# Patient Record
Sex: FEMALE | Race: BLACK OR AFRICAN AMERICAN | Employment: FULL TIME | URBAN - METROPOLITAN AREA
[De-identification: names, ages, dates, MRNs, and addresses within clinical notes are randomized per-mention and may not be internally consistent; named-entity substitution may affect disease eponyms.]

---

## 2017-03-02 ENCOUNTER — OFFICE VISIT (OUTPATIENT)
Dept: FAMILY MEDICINE CLINIC | Age: 57
End: 2017-03-02

## 2017-03-02 VITALS
SYSTOLIC BLOOD PRESSURE: 146 MMHG | HEIGHT: 62 IN | DIASTOLIC BLOOD PRESSURE: 98 MMHG | TEMPERATURE: 97.8 F | BODY MASS INDEX: 32.68 KG/M2 | OXYGEN SATURATION: 94 % | HEART RATE: 75 BPM | RESPIRATION RATE: 12 BRPM | WEIGHT: 177.6 LBS

## 2017-03-02 DIAGNOSIS — I10 HTN, GOAL BELOW 130/80: ICD-10-CM

## 2017-03-02 DIAGNOSIS — E78.00 HYPERCHOLESTEREMIA: Primary | ICD-10-CM

## 2017-03-02 DIAGNOSIS — E03.8 OTHER SPECIFIED HYPOTHYROIDISM: ICD-10-CM

## 2017-03-02 RX ORDER — NEBIVOLOL 5 MG/1
TABLET ORAL
Qty: 30 TAB | Refills: 5 | Status: SHIPPED | OUTPATIENT
Start: 2017-03-02 | End: 2017-10-20 | Stop reason: SDUPTHER

## 2017-03-02 RX ORDER — OLMESARTAN MEDOXOMIL, AMLODIPINE AND HYDROCHLOROTHIAZIDE TABLET 40/10/25 MG 40; 10; 25 MG/1; MG/1; MG/1
TABLET ORAL
Qty: 30 TAB | Refills: 5 | Status: SHIPPED | OUTPATIENT
Start: 2017-03-02 | End: 2017-05-09

## 2017-03-02 NOTE — MR AVS SNAPSHOT
Visit Information Date & Time Provider Department Dept. Phone Encounter #  
 3/2/2017 10:45 AM Radha Pyle MD [address] OFF[facility] [phone] [id number] Upcoming Health Maintenance Date Due Hepatitis C Screening 1960 INFLUENZA AGE 9 TO ADULT 8/1/2016 PAP AKA CERVICAL CYTOLOGY 9/18/2016 BREAST CANCER SCRN MAMMOGRAM 11/24/2017 DTaP/Tdap/Td series (2 - Td) 6/4/2025 Allergies as of 3/2/2017  Review Complete On: 3/2/2017 By: Althea Hopper LPN Severity Noted Reaction Type Reactions Oxycontin [Oxycodone]  09/24/2014   Side Effect Nausea and Vomiting Shellfish Derived  06/04/2015    Hives Current Immunizations  Reviewed on 2/8/2016 Name Date Influenza Vaccine (Quad) PF 2/8/2016 Tdap 6/4/2015 Not reviewed this visit You Were Diagnosed With   
  
 Codes Comments Hypercholesteremia    -  Primary ICD-10-CM: E78.00 ICD-9-CM: 272.0   
 HTN, goal below 130/80     ICD-10-CM: I10 
ICD-9-CM: 401.9 Other specified hypothyroidism     ICD-10-CM: E03.8 ICD-9-CM: 244.8 Vitals BP  
  
  
  
  
  
 (!) 146/98 (BP 1 Location: Left arm, BP Patient Position: At rest) BMI and BSA Data Body Mass Index Body Surface Area  
 32.48 kg/m 2 1.88 m 2 Preferred Pharmacy Pharmacy Name Phone [facility] [address] [place] [address] [phone] Your Updated Medication List  
  
   
This list is accurate as of: 3/2/17 11:36 AM.  Always use your most recent med list.  
  
  
  
  
 aspirin, buffered 81 mg Tab Take 1 Tab by mouth daily. HYDROmorphone 2 mg tablet Commonly known as:  DILAUDID Take 1 tablet by mouth every four (4) hours as needed for Pain. * levothyroxine 50 mcg tablet Commonly known as:  SYNTHROID  
TAKE ONE TABLET BY MOUTH DAILY BEFORE BREAKFAST * levothyroxine 50 mcg tablet Commonly known as:  LEVOTHROID  
 Take 1 Tab by mouth Daily (before breakfast). * levothyroxine 50 mcg tablet Commonly known as:  SYNTHROID  
TAKE ONE TABLET BY MOUTH DAILY BEFORE BREAKFAST  
  
 nebivolol 5 mg tablet Commonly known as:  BYSTOLIC  
TAKE ONE TABLET BY MOUTH DAILY FOR HYPERTENSION  
  
 * TRIBENZOR 40-10-25 mg Tab Generic drug:  Olmesartan-amLODIPine-HCTZ TAKE ONE TABLET BY MOUTH DAILY  
  
 * Olmesartan-amLODIPine-HCTZ 40-10-25 mg Tab Commonly known as:  TRIBENZOR  
TAKE ONE TABLET BY MOUTH DAILY * Notice: This list has 5 medication(s) that are the same as other medications prescribed for you. Read the directions carefully, and ask your doctor or other care provider to review them with you. Prescriptions Sent to Pharmacy Refills Olmesartan-amLODIPine-HCTZ (TRIBENZOR) 40-10-25 mg tab 5 Sig: TAKE ONE TABLET BY MOUTH DAILY Class: Normal  
 Pharmacy: [address] Ph #: [phone]  
 nebivolol (BYSTOLIC) 5 mg tablet 5 Sig: TAKE ONE TABLET BY MOUTH DAILY FOR HYPERTENSION Class: Normal  
 Pharmacy: [address] Ph #: [phone] We Performed the Following CBC W/O DIFF [03624 CPT(R)] LIPID PANEL [36359 CPT(R)] METABOLIC PANEL, COMPREHENSIVE [30922 CPT(R)] TSH 3RD GENERATION [64862 CPT(R)] Introducing [facility]! Jose R Turner introduces [employer] patient portal. Now you can access parts of your medical record, email your doctor's office, and request medication refills online. 1. In your internet browser, go to [email or web address] 2. Click on the First Time User? Click Here link in the Sign In box. You will see the New Member Sign Up page. 3. Enter your [employer] Access Code exactly as it appears below. You will not need to use this code after youve completed the sign-up process.  If you do not sign up before the expiration date, you must request a new code. · [employer] Access Code: [email or web address] Expires: 5/31/2017 11:36 AM 
 
4. Enter the last four digits of your Social Security Number (xxxx) and Date of Birth (mm/dd/yyyy) as indicated and click Submit. You will be taken to the next sign-up page. 5. Create a [employer] ID. This will be your [employer] login ID and cannot be changed, so think of one that is secure and easy to remember. 6. Create a [employer] password. You can change your password at any time. 7. Enter your Password Reset Question and Answer. This can be used at a later time if you forget your password. 8. Enter your e-mail address. You will receive e-mail notification when new information is available in [address] 9. Click Sign Up. You can now view and download portions of your medical record. 10. Click the Download Summary menu link to download a portable copy of your medical information. If you have questions, please visit the Frequently Asked Questions section of the [employer] website. Remember, [employer] is NOT to be used for urgent needs. For medical emergencies, dial 911. Now available from your iPhone and Android! Please provide this summary of care documentation to your next provider. Your primary care clinician is listed as Aiden Nicole. If you have any questions after today's visit, please call [phone].

## 2017-03-02 NOTE — PROGRESS NOTES
HISTORY OF PRESENT ILLNESS  Dionne Hughes is a 55 y.o. female. HPI       HTN  Today pt present for Bp and thyroid check and the patient stating that so far there has not been a Compliancy w/ the bp meds for last couple days ran out of her meds,  she is having had no low salt diet and likes her smoked meats,   the patient has been active life style and does do the daily walking, Ms. Jayme Gruber states that she does not obtain the bp at home,   today the pt denies Chest Pain, has no legs swelling no lightheadedness,  the pat has not been feeling anxious, and  Has not been feeling stressed out, otherwise feeling better since the last visit      Current Outpatient Prescriptions   Medication Sig Dispense Refill    Olmesartan-amLODIPine-HCTZ (TRIBENZOR) 40-10-25 mg tab TAKE ONE TABLET BY MOUTH DAILY 30 Tab 5    nebivolol (BYSTOLIC) 5 mg tablet TAKE ONE TABLET BY MOUTH DAILY FOR HYPERTENSION 30 Tab 5    TRIBENZOR 40-10-25 mg tab TAKE ONE TABLET BY MOUTH DAILY 30 Tab 1    levothyroxine (SYNTHROID) 50 mcg tablet TAKE ONE TABLET BY MOUTH DAILY BEFORE BREAKFAST 30 Tab 5    levothyroxine (SYNTHROID) 50 mcg tablet TAKE ONE TABLET BY MOUTH DAILY BEFORE BREAKFAST 30 Tab 1    levothyroxine (LEVOTHROID) 50 mcg tablet Take 1 Tab by mouth Daily (before breakfast). 30 Tab 6    Aspirin, Buffered 81 mg tab Take 1 Tab by mouth daily. 30 Tab 11    HYDROmorphone (DILAUDID) 2 mg tablet Take 1 tablet by mouth every four (4) hours as needed for Pain.  24 tablet 0     Allergies   Allergen Reactions    Oxycontin [Oxycodone] Nausea and Vomiting    Shellfish Derived Hives     Past Medical History:   Diagnosis Date    Cardiomegaly - hypertensive 6/4/2015    Family history of sarcoidosis 6/4/2015    HTN (hypertension) 3/5/14 notes    noelle urean notes rec'd    Hypercholesteremia 9/24/2014    Hypothyroidism 10/8/2014    Irregular heart beat 9/24/2014    Midline thoracic back pain 6/4/2015    MVP (mitral valve prolapse) 9/24/2014    Prediabetes 9/24/2014    Thyroid disease     Vitamin D deficiency 9/24/2014     Past Surgical History:   Procedure Laterality Date    HX GYN      HX HYSTERECTOMY N/A 1995    HX UROLOGICAL       History reviewed. No pertinent family history. Social History   Substance Use Topics    Smoking status: Never Smoker    Smokeless tobacco: Never Used    Alcohol use No      Lab Results  Component Value Date/Time   WBC 7.9 02/09/2016 08:24 AM   HGB 12.9 02/09/2016 08:24 AM   HCT 38.3 02/09/2016 08:24 AM   PLATELET 265 65/46/6499 08:24 AM   MCV 81 02/09/2016 08:24 AM       Lab Results  Component Value Date/Time   GFR est  02/09/2016 08:24 AM   GFR est non- 02/09/2016 08:24 AM   Creatinine 0.49 02/09/2016 08:24 AM   BUN 16 02/09/2016 08:24 AM   Sodium 140 02/09/2016 08:24 AM   Potassium 3.8 02/09/2016 08:24 AM   Chloride 101 02/09/2016 08:24 AM   CO2 27 02/09/2016 08:24 AM         Review of Systems   Constitutional: Negative for chills and fever. HENT: Negative for ear pain and nosebleeds. Eyes: Negative for blurred vision, pain and discharge. Respiratory: Negative for shortness of breath. Cardiovascular: Negative for chest pain and leg swelling. Gastrointestinal: Negative for constipation, diarrhea, nausea and vomiting. Genitourinary: Negative for frequency. Musculoskeletal: Negative for joint pain. Skin: Negative for itching and rash. Neurological: Negative for headaches. Psychiatric/Behavioral: Negative for depression. The patient is not nervous/anxious. Physical Exam   Constitutional: She is oriented to person, place, and time. She appears well-developed and well-nourished. HENT:   Head: Normocephalic and atraumatic. Eyes: Conjunctivae and EOM are normal.   Neck: Normal range of motion. Neck supple. Cardiovascular: Normal rate, regular rhythm and normal heart sounds. No murmur heard. Pulmonary/Chest: Effort normal and breath sounds normal.   Abdominal: Soft.  Bowel sounds are normal. She exhibits no distension. Musculoskeletal: Normal range of motion. She exhibits no edema. Neurological: She is alert and oriented to person, place, and time. Skin: No erythema. Psychiatric: Her behavior is normal.   Nursing note and vitals reviewed. ASSESSMENT and PLAN  Justyn Rahman was seen today for hypertension and thyroid problem.     Diagnoses and all orders for this visit:    Hypercholesteremia  -     Olmesartan-amLODIPine-HCTZ (TRIBENZOR) 40-10-25 mg tab; TAKE ONE TABLET BY MOUTH DAILY  -     nebivolol (BYSTOLIC) 5 mg tablet; TAKE ONE TABLET BY MOUTH DAILY FOR HYPERTENSION  -     CBC W/O DIFF  -     METABOLIC PANEL, COMPREHENSIVE  -     TSH 3RD GENERATION  -     LIPID PANEL    HTN, goal below 130/80  -     Olmesartan-amLODIPine-HCTZ (TRIBENZOR) 40-10-25 mg tab; TAKE ONE TABLET BY MOUTH DAILY  -     nebivolol (BYSTOLIC) 5 mg tablet; TAKE ONE TABLET BY MOUTH DAILY FOR HYPERTENSION  -     CBC W/O DIFF  -     METABOLIC PANEL, COMPREHENSIVE  -     TSH 3RD GENERATION  -     LIPID PANEL    Other specified hypothyroidism  -     Olmesartan-amLODIPine-HCTZ (TRIBENZOR) 40-10-25 mg tab; TAKE ONE TABLET BY MOUTH DAILY  -     nebivolol (BYSTOLIC) 5 mg tablet; TAKE ONE TABLET BY MOUTH DAILY FOR HYPERTENSION  -     CBC W/O DIFF  -     METABOLIC PANEL, COMPREHENSIVE  -     TSH 3RD GENERATION  -     LIPID PANEL

## 2017-03-03 LAB
ALBUMIN SERPL-MCNC: 3.9 G/DL (ref 3.5–5.5)
ALBUMIN/GLOB SERPL: 1.1 {RATIO} (ref 1.1–2.5)
ALP SERPL-CCNC: 87 IU/L (ref 39–117)
ALT SERPL-CCNC: 24 IU/L (ref 0–32)
AST SERPL-CCNC: 16 IU/L (ref 0–40)
BILIRUB SERPL-MCNC: 0.5 MG/DL (ref 0–1.2)
BUN SERPL-MCNC: 12 MG/DL (ref 6–24)
BUN/CREAT SERPL: 24 (ref 9–23)
CALCIUM SERPL-MCNC: 9.6 MG/DL (ref 8.7–10.2)
CHLORIDE SERPL-SCNC: 97 MMOL/L (ref 96–106)
CHOLEST SERPL-MCNC: 122 MG/DL (ref 100–199)
CO2 SERPL-SCNC: 26 MMOL/L (ref 18–29)
CREAT SERPL-MCNC: 0.5 MG/DL (ref 0.57–1)
ERYTHROCYTE [DISTWIDTH] IN BLOOD BY AUTOMATED COUNT: 13.6 % (ref 12.3–15.4)
GLOBULIN SER CALC-MCNC: 3.7 G/DL (ref 1.5–4.5)
GLUCOSE SERPL-MCNC: 96 MG/DL (ref 65–99)
HCT VFR BLD AUTO: 38.2 % (ref 34–46.6)
HDLC SERPL-MCNC: 33 MG/DL
HGB BLD-MCNC: 12.6 G/DL (ref 11.1–15.9)
LDLC SERPL CALC-MCNC: 68 MG/DL (ref 0–99)
MCH RBC QN AUTO: 26.5 PG (ref 26.6–33)
MCHC RBC AUTO-ENTMCNC: 33 G/DL (ref 31.5–35.7)
MCV RBC AUTO: 80 FL (ref 79–97)
PLATELET # BLD AUTO: 300 X10E3/UL (ref 150–379)
POTASSIUM SERPL-SCNC: 3.5 MMOL/L (ref 3.5–5.2)
PROT SERPL-MCNC: 7.6 G/DL (ref 6–8.5)
RBC # BLD AUTO: 4.76 X10E6/UL (ref 3.77–5.28)
SODIUM SERPL-SCNC: 139 MMOL/L (ref 134–144)
TRIGL SERPL-MCNC: 107 MG/DL (ref 0–149)
TSH SERPL DL<=0.005 MIU/L-ACNC: 0.72 UIU/ML (ref 0.45–4.5)
VLDLC SERPL CALC-MCNC: 21 MG/DL (ref 5–40)
WBC # BLD AUTO: 9.4 X10E3/UL (ref 3.4–10.8)

## 2017-04-17 RX ORDER — LEVOTHYROXINE SODIUM 50 UG/1
TABLET ORAL
Qty: 30 TAB | Refills: 4 | Status: SHIPPED | OUTPATIENT
Start: 2017-04-17 | End: 2017-09-21 | Stop reason: SDUPTHER

## 2017-05-09 RX ORDER — AMLODIPINE BESYLATE 10 MG/1
10 TABLET ORAL DAILY
Qty: 30 TAB | Refills: 4 | Status: SHIPPED | OUTPATIENT
Start: 2017-05-09 | End: 2018-01-29 | Stop reason: ALTCHOICE

## 2017-05-09 RX ORDER — LOSARTAN POTASSIUM AND HYDROCHLOROTHIAZIDE 25; 100 MG/1; MG/1
1 TABLET ORAL DAILY
Qty: 30 TAB | Refills: 4 | Status: SHIPPED | OUTPATIENT
Start: 2017-05-09 | End: 2018-01-29 | Stop reason: ALTCHOICE

## 2017-08-03 DIAGNOSIS — E78.00 HYPERCHOLESTEREMIA: ICD-10-CM

## 2017-08-03 DIAGNOSIS — I10 HTN, GOAL BELOW 130/80: ICD-10-CM

## 2017-08-07 RX ORDER — OLMESARTAN MEDOXOMIL / AMLODIPINE BESYLATE / HYDROCHLOROTHIAZIDE 40; 10; 25 MG/1; MG/1; MG/1
TABLET, FILM COATED ORAL
Qty: 30 TAB | Refills: 4 | Status: SHIPPED | OUTPATIENT
Start: 2017-08-07 | End: 2018-01-25 | Stop reason: SDUPTHER

## 2017-09-21 RX ORDER — LEVOTHYROXINE SODIUM 50 UG/1
TABLET ORAL
Qty: 30 TAB | Refills: 3 | Status: SHIPPED | OUTPATIENT
Start: 2017-09-21 | End: 2018-01-24 | Stop reason: SDUPTHER

## 2017-10-17 DIAGNOSIS — I10 HTN, GOAL BELOW 130/80: ICD-10-CM

## 2017-10-17 DIAGNOSIS — E03.8 OTHER SPECIFIED HYPOTHYROIDISM: ICD-10-CM

## 2017-10-17 DIAGNOSIS — E78.00 HYPERCHOLESTEREMIA: ICD-10-CM

## 2017-10-20 DIAGNOSIS — E03.8 OTHER SPECIFIED HYPOTHYROIDISM: ICD-10-CM

## 2017-10-20 DIAGNOSIS — I10 HTN, GOAL BELOW 130/80: ICD-10-CM

## 2017-10-20 DIAGNOSIS — E78.00 HYPERCHOLESTEREMIA: ICD-10-CM

## 2017-10-20 NOTE — TELEPHONE ENCOUNTER
----- Message from Elaine Fernandes sent at 10/20/2017  8:25 AM EDT -----  Regarding: /Telephone  Pt inquiring about status of refill for  \"Bystolic\" Rx. Please give a call. Best contact [phone].

## 2017-10-23 ENCOUNTER — TELEPHONE (OUTPATIENT)
Dept: FAMILY MEDICINE CLINIC | Age: 57
End: 2017-10-23

## 2017-10-23 DIAGNOSIS — E03.8 OTHER SPECIFIED HYPOTHYROIDISM: ICD-10-CM

## 2017-10-23 DIAGNOSIS — I10 HTN, GOAL BELOW 130/80: ICD-10-CM

## 2017-10-23 DIAGNOSIS — E78.00 HYPERCHOLESTEREMIA: ICD-10-CM

## 2017-10-23 RX ORDER — NEBIVOLOL 5 MG/1
TABLET ORAL
Qty: 30 TAB | Refills: 0 | Status: SHIPPED | OUTPATIENT
Start: 2017-10-23 | End: 2018-01-29 | Stop reason: SDUPTHER

## 2017-10-23 NOTE — TELEPHONE ENCOUNTER
----- Message from Pushpa Pineda sent at 10/23/2017 12:29 PM EDT -----  Regarding: / refill  Pt requesting a refill for \"Dystolic\" to be sent to the [address] [place] [address] . Pt stated refill requested was put in on 10/18/17. Best contact number [phone].

## 2017-10-24 RX ORDER — NEBIVOLOL 5 MG/1
TABLET ORAL
Qty: 30 TAB | Refills: 0 | Status: SHIPPED | OUTPATIENT
Start: 2017-10-24 | End: 2018-01-29 | Stop reason: SDUPTHER

## 2017-10-24 RX ORDER — NEBIVOLOL HYDROCHLORIDE 5 MG/1
TABLET ORAL
Qty: 30 TAB | Refills: 4 | Status: SHIPPED | OUTPATIENT
Start: 2017-10-24 | End: 2018-01-29 | Stop reason: SDUPTHER

## 2018-01-25 DIAGNOSIS — I10 HTN, GOAL BELOW 130/80: ICD-10-CM

## 2018-01-25 DIAGNOSIS — E78.00 HYPERCHOLESTEREMIA: ICD-10-CM

## 2018-01-25 RX ORDER — LEVOTHYROXINE SODIUM 50 UG/1
TABLET ORAL
Qty: 30 TAB | Refills: 2 | Status: SHIPPED | OUTPATIENT
Start: 2018-01-25 | End: 2018-01-29 | Stop reason: SDUPTHER

## 2018-01-25 RX ORDER — OLMESARTAN MEDOXOMIL / AMLODIPINE BESYLATE / HYDROCHLOROTHIAZIDE 40; 10; 25 MG/1; MG/1; MG/1
TABLET, FILM COATED ORAL
Qty: 30 TAB | Refills: 0 | Status: SHIPPED | OUTPATIENT
Start: 2018-01-25 | End: 2018-01-29 | Stop reason: SDUPTHER

## 2018-01-25 NOTE — TELEPHONE ENCOUNTER
----- Message from [facility] sent at 1/25/2018 12:46 PM EST -----  Regarding: Dr Holbrook/Telephone  Pt is requesting a few pills of her Thyroid medication and \"Tribenzor\" to last her the weekend until she sees the doctor on 1/29. Pt would like a call back if this can be done.   Pt best contact [phone]

## 2018-01-29 ENCOUNTER — OFFICE VISIT (OUTPATIENT)
Dept: FAMILY MEDICINE CLINIC | Age: 58
End: 2018-01-29

## 2018-01-29 VITALS
SYSTOLIC BLOOD PRESSURE: 134 MMHG | HEART RATE: 78 BPM | OXYGEN SATURATION: 95 % | TEMPERATURE: 95 F | HEIGHT: 62 IN | BODY MASS INDEX: 33.68 KG/M2 | WEIGHT: 183 LBS | RESPIRATION RATE: 14 BRPM | DIASTOLIC BLOOD PRESSURE: 89 MMHG

## 2018-01-29 DIAGNOSIS — R73.03 PREDIABETES: ICD-10-CM

## 2018-01-29 DIAGNOSIS — E78.00 HYPERCHOLESTEREMIA: ICD-10-CM

## 2018-01-29 DIAGNOSIS — Z12.11 SCREEN FOR COLON CANCER: ICD-10-CM

## 2018-01-29 DIAGNOSIS — E03.8 OTHER SPECIFIED HYPOTHYROIDISM: ICD-10-CM

## 2018-01-29 DIAGNOSIS — I10 HTN, GOAL BELOW 130/80: Primary | ICD-10-CM

## 2018-01-29 DIAGNOSIS — E03.9 HYPOTHYROIDISM, UNSPECIFIED TYPE: ICD-10-CM

## 2018-01-29 DIAGNOSIS — Z23 ENCOUNTER FOR IMMUNIZATION: ICD-10-CM

## 2018-01-29 RX ORDER — LEVOTHYROXINE SODIUM 50 UG/1
50 TABLET ORAL
Qty: 30 TAB | Refills: 11 | Status: SHIPPED | OUTPATIENT
Start: 2018-01-29 | End: 2019-02-14 | Stop reason: SDUPTHER

## 2018-01-29 RX ORDER — OLMESARTAN MEDOXOMIL, AMLODIPINE AND HYDROCHLOROTHIAZIDE TABLET 40/10/25 MG 40; 10; 25 MG/1; MG/1; MG/1
TABLET ORAL
Qty: 30 TAB | Refills: 11 | Status: SHIPPED | OUTPATIENT
Start: 2018-01-29 | End: 2019-02-14 | Stop reason: SDUPTHER

## 2018-01-29 RX ORDER — NEBIVOLOL 5 MG/1
TABLET ORAL
Qty: 30 TAB | Refills: 11 | Status: SHIPPED | OUTPATIENT
Start: 2018-01-29 | End: 2019-01-28 | Stop reason: SDUPTHER

## 2018-01-29 NOTE — PROGRESS NOTES
HISTORY OF PRESENT ILLNESS  Rosanglea Lim is a 65 y.o. female. HPI   Refusing statin and colonoscopy, but agrees with her flu immunizations at this time present for f/u on her HTN, Today pt present for Bp check and the patient stating that so far there has been a Compliancy w/ the bp meds, she is having had the low salt diet   the patient has been active life style and does do the daily walking, in addition pt states that patien does not obtain the bp at home   today the pt denies Chest Pain, has no legs swelling no lightheadedness,the patient has not been feeling anxious, and  Has not been feeling stressed out, otherwise feeling better since the last visit      Current Outpatient Prescriptions   Medication Sig Dispense Refill    Olmesartan-amLODIPine-HCTZ (TRIBENZOR) 40-10-25 mg tab TAKE ONE TABLET BY MOUTH DAILY 30 Tab 11    nebivolol (BYSTOLIC) 5 mg tablet TAKE ONE TABLET BY MOUTH DAILY FOR HYPERTENSION 30 Tab 11    levothyroxine (LEVOTHROID) 50 mcg tablet Take 1 Tab by mouth Daily (before breakfast). 30 Tab 11    Aspirin, Buffered 81 mg tab Take 1 Tab by mouth daily. 30 Tab 11     Allergies   Allergen Reactions    Oxycontin [Oxycodone] Nausea and Vomiting    Shellfish Derived Hives     Past Medical History:   Diagnosis Date    BMI 33.0-33.9,adult 1/29/2018    Cardiomegaly - hypertensive 6/4/2015    Family history of sarcoidosis 6/4/2015    HTN (hypertension) 3/5/14 notes    noelle urena notes rec'd    Hypercholesteremia 9/24/2014    Hypothyroidism 10/8/2014    Irregular heart beat 9/24/2014    Midline thoracic back pain 6/4/2015    MVP (mitral valve prolapse) 9/24/2014    Prediabetes 9/24/2014    Thyroid disease     Vitamin D deficiency 9/24/2014     Past Surgical History:   Procedure Laterality Date    HX GYN      HX HYSTERECTOMY N/A 1995    HX UROLOGICAL       History reviewed. No pertinent family history.   Social History   Substance Use Topics    Smoking status: Never Smoker    Smokeless tobacco: Never Used    Alcohol use No      Lab Results  Component Value Date/Time   WBC 9.4 03/02/2017 11:37 AM   HGB 12.6 03/02/2017 11:37 AM   HCT 38.2 03/02/2017 11:37 AM   PLATELET 307 44/81/1777 11:37 AM   MCV 80 03/02/2017 11:37 AM     Lab Results  Component Value Date/Time   GFR est non- 03/02/2017 11:37 AM   GFR est  03/02/2017 11:37 AM   Creatinine 0.50 03/02/2017 11:37 AM   BUN 12 03/02/2017 11:37 AM   Sodium 139 03/02/2017 11:37 AM   Potassium 3.5 03/02/2017 11:37 AM   Chloride 97 03/02/2017 11:37 AM   CO2 26 03/02/2017 11:37 AM        Review of Systems   Constitutional: Negative for chills, fever and malaise/fatigue. HENT: Negative for congestion and nosebleeds. Eyes: Negative for blurred vision and pain. Respiratory: Negative for shortness of breath and wheezing. Cardiovascular: Negative for chest pain, palpitations and leg swelling. Gastrointestinal: Negative for constipation, diarrhea, nausea and vomiting. Genitourinary: Negative for dysuria and frequency. Musculoskeletal: Negative for joint pain and myalgias. Skin: Negative for itching and rash. Neurological: Negative for dizziness, loss of consciousness and headaches. Endo/Heme/Allergies: Does not bruise/bleed easily. Psychiatric/Behavioral: Negative for depression. The patient is not nervous/anxious and does not have insomnia. All other systems reviewed and are negative. Physical Exam   Constitutional: She is oriented to person, place, and time. She appears well-developed and well-nourished. HENT:   Head: Normocephalic and atraumatic. Eyes: Conjunctivae and EOM are normal. Pupils are equal, round, and reactive to light. Neck: No JVD present. No thyromegaly present. Cardiovascular: Normal rate, regular rhythm, normal heart sounds and intact distal pulses. Exam reveals no gallop and no friction rub. No murmur heard. Pulmonary/Chest: Effort normal and breath sounds normal. No stridor.  No respiratory distress. She has no wheezes. She has no rales. Abdominal: Soft. Bowel sounds are normal. She exhibits no distension and no mass. There is no tenderness. Musculoskeletal: Normal range of motion. She exhibits no edema or tenderness. Lymphadenopathy:     She has no cervical adenopathy. Neurological: She is alert and oriented to person, place, and time. She has normal reflexes. No cranial nerve deficit. Skin: No rash noted. No erythema. Psychiatric: She has a normal mood and affect. Her behavior is normal.   Nursing note and vitals reviewed. ASSESSMENT and PLAN  Diagnoses and all orders for this visit:    1. HTN, goal below 130/80  -     HEPATITIS C AB  -     TSH 3RD GENERATION  -     CBC W/O DIFF  -     METABOLIC PANEL, COMPREHENSIVE  -     HDL CHOLESTEROL  -     CHOLESTEROL, TOTAL  -     Olmesartan-amLODIPine-HCTZ (TRIBENZOR) 40-10-25 mg tab; TAKE ONE TABLET BY MOUTH DAILY  -     nebivolol (BYSTOLIC) 5 mg tablet; TAKE ONE TABLET BY MOUTH DAILY FOR HYPERTENSION  -     levothyroxine (LEVOTHROID) 50 mcg tablet; Take 1 Tab by mouth Daily (before breakfast). 2. Prediabetes  -     HEPATITIS C AB  -     TSH 3RD GENERATION  -     CBC W/O DIFF  -     METABOLIC PANEL, COMPREHENSIVE  -     HDL CHOLESTEROL  -     CHOLESTEROL, TOTAL    3. Hypercholesteremia  -     HEPATITIS C AB  -     TSH 3RD GENERATION  -     CBC W/O DIFF  -     METABOLIC PANEL, COMPREHENSIVE  -     HDL CHOLESTEROL  -     CHOLESTEROL, TOTAL  -     Olmesartan-amLODIPine-HCTZ (TRIBENZOR) 40-10-25 mg tab; TAKE ONE TABLET BY MOUTH DAILY  -     nebivolol (BYSTOLIC) 5 mg tablet; TAKE ONE TABLET BY MOUTH DAILY FOR HYPERTENSION    4. Hypothyroidism, unspecified type  -     HEPATITIS C AB  -     TSH 3RD GENERATION  -     CBC W/O DIFF  -     METABOLIC PANEL, COMPREHENSIVE  -     HDL CHOLESTEROL  -     CHOLESTEROL, TOTAL    5.  Other specified hypothyroidism  -     nebivolol (BYSTOLIC) 5 mg tablet; TAKE ONE TABLET BY MOUTH DAILY FOR HYPERTENSION    6. BMI 33.0-33.9,adult    7. Screen for colon cancer  -     OCCULT BLOOD, IMMUNOASSAY (FIT)    8.  Encounter for immunization  -     [place] IMMUNIZ ADMIN,1 SINGLE/COMB VAC/TOXOID  -     Influenza virus vaccine (QUADRIVALENT PRES FREE SYRINGE) IM ([id number])

## 2018-01-29 NOTE — MR AVS SNAPSHOT
[address] [facility] [id number] 
[phone] Patient: Carrol Pimentel MRN: [id number] Visit Information Date & Time Provider Department Dept. Phone Encounter #  
 1/29/2018  4:00 PM Elizabeth Car MD [address] [place] [address] OFFICE-ANNEX [phone] [id number] Follow-up Instructions Return in about 6 months (around 7/29/2018), or if symptoms worsen or fail to improve. Upcoming Health Maintenance Date Due Hepatitis C Screening 1960 PAP AKA CERVICAL CYTOLOGY 9/18/2016 Influenza Age 5 to Adult 8/1/2017 BREAST CANCER SCRN MAMMOGRAM 11/24/2017 DTaP/Tdap/Td series (2 - Td) 6/4/2025 Allergies as of 1/29/2018  Review Complete On: 3/2/2017 By: Luverne Leventhal, LPN Severity Noted Reaction Type Reactions Oxycontin [Oxycodone]  09/24/2014   Side Effect Nausea and Vomiting Shellfish Derived  06/04/2015    Hives Current Immunizations  Reviewed on 1/29/2018 Name Date Influenza Vaccine (Quad) PF  Incomplete, 2/8/2016 Tdap 6/4/2015 Reviewed by Elizabeth Car MD on 1/29/2018 at  4:56 PM  
You Were Diagnosed With   
  
 Codes Comments HTN, goal below 130/80    -  Primary ICD-10-CM: I10 
ICD-9-CM: 401.9 Prediabetes     ICD-10-CM: R73.03 
ICD-9-CM: 790.29 Hypercholesteremia     ICD-10-CM: E78.00 ICD-9-CM: 272.0 Hypothyroidism, unspecified type     ICD-10-CM: E03.9 ICD-9-CM: 244.9 Other specified hypothyroidism     ICD-10-CM: E03.8 ICD-9-CM: 244.8 BMI 33.0-33.9,adult     ICD-10-CM: Z18.77 
ICD-9-CM: V85.33 Screen for colon cancer     ICD-10-CM: Z12.11 ICD-9-CM: V76.51 Encounter for immunization     ICD-10-CM: D27 ICD-9-CM: V03.89 Vitals BP Pulse Temp Resp Height(growth percentile) Weight(growth percentile) 134/89 78 95 °F (35 °C) (Oral) 14 5' 2\" (1.575 m) 183 lb (83 kg) SpO2 BMI OB Status Smoking Status 95% 33.47 kg/m2 Hysterectomy Never Smoker Vitals History BMI and BSA Data Body Mass Index Body Surface Area  
 33.47 kg/m 2 1.91 m 2 Preferred Pharmacy Pharmacy Name Phone [facility] [address] [place] [address] [phone] Your Updated Medication List  
  
   
This list is accurate as of: 1/29/18  5:08 PM.  Always use your most recent med list.  
  
  
  
  
 aspirin, buffered 81 mg Tab Take 1 Tab by mouth daily. levothyroxine 50 mcg tablet Commonly known as:  LEVOTHROID Take 1 Tab by mouth Daily (before breakfast). nebivolol 5 mg tablet Commonly known as:  BYSTOLIC  
TAKE ONE TABLET BY MOUTH DAILY FOR HYPERTENSION Olmesartan-amLODIPine-HCTZ 40-10-25 mg Tab Commonly known as:  TRIBENZOR  
TAKE ONE TABLET BY MOUTH DAILY Prescriptions Sent to Pharmacy Refills Olmesartan-amLODIPine-HCTZ (TRIBENZOR) 40-10-25 mg tab 11 Sig: TAKE ONE TABLET BY MOUTH DAILY Class: Normal  
 Pharmacy: [address] Ph #: [phone]  
 nebivolol (BYSTOLIC) 5 mg tablet 11 Sig: TAKE ONE TABLET BY MOUTH DAILY FOR HYPERTENSION Class: Normal  
 Pharmacy: [address] Ph #: [phone]  
 levothyroxine (LEVOTHROID) 50 mcg tablet 11 Sig: Take 1 Tab by mouth Daily (before breakfast). Class: Normal  
 Pharmacy: [address] Ph #: [phone] Route: Oral  
  
We Performed the Following CBC W/O DIFF [34564 CPT(R)] CHOLESTEROL, TOTAL [76822 CPT(R)] HDL CHOLESTEROL [58521 CPT(R)] HEPATITIS C AB [13231 CPT(R)] INFLUENZA VIRUS VAC QUAD,SPLIT,PRESV FREE SYRINGE IM [id number] CPT(R)] METABOLIC PANEL, COMPREHENSIVE [92872 CPT(R)] OCCULT BLOOD, IMMUNOASSAY (FIT) [id number] CPT(R)] AL IMMUNIZ ADMIN,1 SINGLE/COMB VAC/TOXOID [id number] CPT(R)] [facility] 3RD GENERATION [48531 CPT(R)] Follow-up Instructions Return in about 6 months (around 7/29/2018), or if symptoms worsen or fail to improve. Patient Instructions DASH Diet: Care Instructions Your Care Instructions The DASH diet is an eating plan that can help lower your blood pressure. DASH stands for Dietary Approaches to Stop Hypertension. Hypertension is high blood pressure. The DASH diet focuses on eating foods that are high in calcium, potassium, and magnesium. These nutrients can lower blood pressure. The foods that are highest in these nutrients are fruits, vegetables, low-fat dairy products, nuts, seeds, and legumes. But taking calcium, potassium, and magnesium supplements instead of eating foods that are high in those nutrients does not have the same effect. The DASH diet also includes whole grains, fish, and poultry. The DASH diet is one of several lifestyle changes your doctor may recommend to lower your high blood pressure. Your doctor may also want you to decrease the amount of sodium in your diet. Lowering sodium while following the DASH diet can lower blood pressure even further than just the DASH diet alone. Follow-up care is a key part of your treatment and safety. Be sure to make and go to all appointments, and call your doctor if you are having problems. It's also a good idea to know your test results and keep a list of the medicines you take. How can you care for yourself at home? Following the DASH diet · Eat 4 to 5 servings of fruit each day. A serving is 1 medium-sized piece of fruit, ½ cup chopped or canned fruit, 1/4 cup dried fruit, or 4 ounces (½ cup) of fruit juice. Choose fruit more often than fruit juice. · Eat 4 to 5 servings of vegetables each day. A serving is 1 cup of lettuce or raw leafy vegetables, ½ cup of chopped or cooked vegetables, or 4 ounces (½ cup) of vegetable juice. Choose vegetables more often than vegetable juice. · Get 2 to 3 servings of low-fat and fat-free dairy each day. A serving is 8 ounces of milk, 1 cup of yogurt, or 1 ½ ounces of cheese. · Eat 6 to 8 servings of grains each day. A serving is 1 slice of bread, 1 ounce of dry cereal, or ½ cup of cooked rice, pasta, or cooked cereal. Try to choose whole-grain products as much as possible. · Limit lean meat, poultry, and fish to 2 servings each day. A serving is 3 ounces, about the size of a deck of cards. · Eat 4 to 5 servings of nuts, seeds, and legumes (cooked dried beans, lentils, and split peas) each week. A serving is 1/3 cup of nuts, 2 tablespoons of seeds, or ½ cup of cooked beans or peas. · Limit fats and oils to 2 to 3 servings each day. A serving is 1 teaspoon of vegetable oil or 2 tablespoons of salad dressing. · Limit sweets and added sugars to 5 servings or less a week. A serving is 1 tablespoon jelly or jam, ½ cup sorbet, or 1 cup of lemonade. · Eat less than 2,300 milligrams (mg) of sodium a day. If you limit your sodium to 1,500 mg a day, you can lower your blood pressure even more. Tips for success · Start small. Do not try to make dramatic changes to your diet all at once. You might feel that you are missing out on your favorite foods and then be more likely to not follow the plan. Make small changes, and stick with them. Once those changes become habit, add a few more changes. · Try some of the following: ¨ Make it a goal to eat a fruit or vegetable at every meal and at snacks. This will make it easy to get the recommended amount of fruits and vegetables each day. ¨ Try yogurt topped with fruit and nuts for a snack or healthy dessert. ¨ Add lettuce, tomato, cucumber, and onion to sandwiches. ¨ Combine a ready-made pizza crust with low-fat mozzarella cheese and lots of vegetable toppings. Try using tomatoes, squash, spinach, broccoli, carrots, cauliflower, and onions. ¨ Have a variety of cut-up vegetables with a low-fat dip as an appetizer instead of chips and dip. ¨ Sprinkle sunflower seeds or chopped almonds over salads. Or try adding chopped walnuts or almonds to cooked vegetables. ¨ Try some vegetarian meals using beans and peas. Add garbanzo or kidney beans to salads. Make burritos and tacos with mashed figueroa beans or black beans. Where can you learn more? Go to http://lora-adiel.info/. Enter A852 in the search box to learn more about \"DASH Diet: Care Instructions. \" Current as of: September 21, 2016 Content Version: 11.4 © 2221-2353 [employer]. Care instructions adapted under license by [employer] (which disclaims liability or warranty for this information). If you have questions about a medical condition or this instruction, always ask your healthcare professional. [place] [address] any warranty or liability for your use of this information. Learning About Carbohydrates What are carbohydrates? Carbohydrates are an important nutrient you get from food. It's a great source of energy for your body and helps your brain and nervous system work properly. How does your body use carbohydrates? After you eat food with carbs in it, your body digests the carbohydrates and turns them into a kind of sugar that goes into your blood. The blood carries this sugar to the cells in your body. The cells use the sugar to give you energy. Extra sugar is stored in the cells for later use. If it isn't used, it turns into fat. Where do carbohydrates come from? The healthiest carbohydrate choices are breads, cereals, and pastas made with whole grains; brown rice; low-fat dairy products; vegetables; legumes such as peas, lentils, and beans; and fruits. Foods made from refined flour, including bread, pasta, doughnuts, cookies, and desserts, also contain carbohydrates. So do sweets such as candy and soda. How can you get the right kind and amount of carbs? Eating too much of anything can lead to weight gain. And that can lead to other health problems. Here are some tips to help you eat the right amount of the right kind of carbs so you have the nutrition and the energy you need: 
· Eat 3 to 8 servings of grains (breads, cereals, rice, pasta) each day. For example, a serving is 1 slice of bread, 1 cup of boxed cereal, or ½ cup of cooked rice, cooked pasta, or cooked cereal. Go to www. choosemyplate.gov to learn how many servings you need. ¨ Buy bread that lists whole wheat (or other whole grains), stone-ground wheat, or cracked wheat as the first ingredient. ¨ Eat brown rice, bulgur, or millet instead of white rice. ¨ Eat pasta and cereals made from whole grain flour instead of refined flour. · Eat several servings a day of fresh fruits and vegetables. These include raspberries, apples, figs, oranges, pears, prunes, broccoli, brussels sprouts, carrots, corn, peas, and beans. And there are lots of other fruits and vegetables to choose from. · Limit the amount of candy, desserts, and soda in your diet. Where can you learn more? Go to http://lora-adiel.info/. Enter F199 in the search box to learn more about \"Learning About Carbohydrates. \" Current as of: May 12, 2017 Content Version: 11.4 © 9526-7166 [employer]. Care instructions adapted under license by [employer] (which disclaims liability or warranty for this information). If you have questions about a medical condition or this instruction, always ask your healthcare professional. [place] [address] any warranty or liability for your use of this information. Learning About Low-Carbohydrate Diets for Weight Loss What is a low-carbohydrate diet? Low-carb diets avoid foods that are high in carbohydrate.  These high-carb foods include pasta, bread, rice, cereal, fruits, and starchy vegetables. Instead, these diets usually have you eat foods that are high in fat and protein. Many people lose weight quickly on a low-carb diet. But the early weight loss is water. People on this diet often gain the weight back after they start eating carbs again. Not all diet plans are safe or work well. A lot of the evidence shows that low-carb diets aren't healthy. That's because these diets often don't include healthy foods like fruits and vegetables. Losing weight safely means balancing protein, fat, and carbs with every meal and snack. And low-carb diets don't always provide the vitamins, minerals, and fiber you need. If you have a serious medical condition, talk to your doctor before you try any diet. These conditions include kidney disease, heart disease, type 2 diabetes, high cholesterol, and high blood pressure. If you are pregnant, it may not be safe for your baby if you are on a low-carb diet. How can you lose weight safely? You might have heard that a diet plan helped another person lose weight. But that doesn't mean that it will work for you. It is very hard to stay on a diet that includes lots of big changes in your eating habits. If you want to get to a healthy weight and stay there, making healthy lifestyle changes will often work better than dieting. These steps can help. · Make a plan for change. Work with your doctor to create a plan that is right for you. · See a dietitian. He or she can show you how to make healthy changes in your eating habits. · Manage stress. If you have a lot of stress in your life, it can be hard to focus on making healthy changes to your daily habits. · Track your food and activity. You are likely to do better at losing weight if you keep track of what you eat and what you do. Follow-up care is a key part of your treatment and safety.  Be sure to make and go to all appointments, and call your doctor if you are having problems. It's also a good idea to know your test results and keep a list of the medicines you take. Where can you learn more? Go to http://lora-adiel.info/. Enter A121 in the search box to learn more about \"Learning About Low-Carbohydrate Diets for Weight Loss. \" Current as of: May 12, 2017 Content Version: 11.4 © 2840-5034 [employer]. Care instructions adapted under license by [employer] (which disclaims liability or warranty for this information). If you have questions about a medical condition or this instruction, always ask your healthcare professional. [address] any warranty or liability for your use of this information. DASH Diet: Care Instructions Your Care Instructions The DASH diet is an eating plan that can help lower your blood pressure. DASH stands for Dietary Approaches to Stop Hypertension. Hypertension is high blood pressure. The DASH diet focuses on eating foods that are high in calcium, potassium, and magnesium. These nutrients can lower blood pressure. The foods that are highest in these nutrients are fruits, vegetables, low-fat dairy products, nuts, seeds, and legumes. But taking calcium, potassium, and magnesium supplements instead of eating foods that are high in those nutrients does not have the same effect. The DASH diet also includes whole grains, fish, and poultry. The DASH diet is one of several lifestyle changes your doctor may recommend to lower your high blood pressure. Your doctor may also want you to decrease the amount of sodium in your diet. Lowering sodium while following the DASH diet can lower blood pressure even further than just the DASH diet alone. Follow-up care is a key part of your treatment and safety.  Be sure to make and go to all appointments, and call your doctor if you are having problems. It's also a good idea to know your test results and keep a list of the medicines you take. How can you care for yourself at home? Following the DASH diet · Eat 4 to 5 servings of fruit each day. A serving is 1 medium-sized piece of fruit, ½ cup chopped or canned fruit, 1/4 cup dried fruit, or 4 ounces (½ cup) of fruit juice. Choose fruit more often than fruit juice. · Eat 4 to 5 servings of vegetables each day. A serving is 1 cup of lettuce or raw leafy vegetables, ½ cup of chopped or cooked vegetables, or 4 ounces (½ cup) of vegetable juice. Choose vegetables more often than vegetable juice. · Get 2 to 3 servings of low-fat and fat-free dairy each day. A serving is 8 ounces of milk, 1 cup of yogurt, or 1 ½ ounces of cheese. · Eat 6 to 8 servings of grains each day. A serving is 1 slice of bread, 1 ounce of dry cereal, or ½ cup of cooked rice, pasta, or cooked cereal. Try to choose whole-grain products as much as possible. · Limit lean meat, poultry, and fish to 2 servings each day. A serving is 3 ounces, about the size of a deck of cards. · Eat 4 to 5 servings of nuts, seeds, and legumes (cooked dried beans, lentils, and split peas) each week. A serving is 1/3 cup of nuts, 2 tablespoons of seeds, or ½ cup of cooked beans or peas. · Limit fats and oils to 2 to 3 servings each day. A serving is 1 teaspoon of vegetable oil or 2 tablespoons of salad dressing. · Limit sweets and added sugars to 5 servings or less a week. A serving is 1 tablespoon jelly or jam, ½ cup sorbet, or 1 cup of lemonade. · Eat less than 2,300 milligrams (mg) of sodium a day. If you limit your sodium to 1,500 mg a day, you can lower your blood pressure even more. Tips for success · Start small. Do not try to make dramatic changes to your diet all at once. You might feel that you are missing out on your favorite foods and then be more likely to not follow the plan.  Make small changes, and stick with them. Once those changes become habit, add a few more changes. · Try some of the following: ¨ Make it a goal to eat a fruit or vegetable at every meal and at snacks. This will make it easy to get the recommended amount of fruits and vegetables each day. ¨ Try yogurt topped with fruit and nuts for a snack or healthy dessert. ¨ Add lettuce, tomato, cucumber, and onion to sandwiches. ¨ Combine a ready-made pizza crust with low-fat mozzarella cheese and lots of vegetable toppings. Try using tomatoes, squash, spinach, broccoli, carrots, cauliflower, and onions. ¨ Have a variety of cut-up vegetables with a low-fat dip as an appetizer instead of chips and dip. ¨ Sprinkle sunflower seeds or chopped almonds over salads. Or try adding chopped walnuts or almonds to cooked vegetables. ¨ Try some vegetarian meals using beans and peas. Add garbanzo or kidney beans to salads. Make burritos and tacos with mashed figueroa beans or black beans. Where can you learn more? Go to http://lora-adiel.info/. Enter Q314 in the search box to learn more about \"DASH Diet: Care Instructions. \" Current as of: September 21, 2016 Content Version: 11.4 © 4114-0681 [employer]. Care instructions adapted under license by [employer] (which disclaims liability or warranty for this information). If you have questions about a medical condition or this instruction, always ask your healthcare professional. [place] [address] any warranty or liability for your use of this information. High Blood Pressure: Care Instructions Your Care Instructions If your blood pressure is usually above 140/90, you have high blood pressure, or hypertension. That means the top number is 140 or higher or the bottom number is 90 or higher, or both. Despite what a lot of people think, high blood pressure usually doesn't cause headaches or make you feel dizzy or lightheaded.  It usually has no symptoms. But it does increase your risk for heart attack, stroke, and kidney or eye damage. The higher your blood pressure, the more your risk increases. Your doctor will give you a goal for your blood pressure. Your goal will be based on your health and your age. An example of a goal is to keep your blood pressure below 140/90. Lifestyle changes, such as eating healthy and being active, are always important to help lower blood pressure. You might also take medicine to reach your blood pressure goal. 
Follow-up care is a key part of your treatment and safety. Be sure to make and go to all appointments, and call your doctor if you are having problems. It's also a good idea to know your test results and keep a list of the medicines you take. How can you care for yourself at home? Medical treatment · If you stop taking your medicine, your blood pressure will go back up. You may take one or more types of medicine to lower your blood pressure. Be safe with medicines. Take your medicine exactly as prescribed. Call your doctor if you think you are having a problem with your medicine. · Talk to your doctor before you start taking aspirin every day. Aspirin can help certain people lower their risk of a heart attack or stroke. But taking aspirin isn't right for everyone, because it can cause serious bleeding. · See your doctor regularly. You may need to see the doctor more often at first or until your blood pressure comes down. · If you are taking blood pressure medicine, talk to your doctor before you take decongestants or anti-inflammatory medicine, such as ibuprofen. Some of these medicines can raise blood pressure. · Learn how to check your blood pressure at home. Lifestyle changes · Stay at a healthy weight. This is especially important if you put on weight around the waist. Losing even 10 pounds can help you lower your blood pressure. · If your doctor recommends it, get more exercise.  Walking is a good choice. Bit by bit, increase the amount you walk every day. Try for at least 30 minutes on most days of the week. You also may want to swim, bike, or do other activities. · Avoid or limit alcohol. Talk to your doctor about whether you can drink any alcohol. · Try to limit how much sodium you eat to less than 2,300 milligrams (mg) a day. Your doctor may ask you to try to eat less than 1,500 mg a day. · Eat plenty of fruits (such as bananas and oranges), vegetables, legumes, whole grains, and low-fat dairy products. · Lower the amount of saturated fat in your diet. Saturated fat is found in animal products such as milk, cheese, and meat. Limiting these foods may help you lose weight and also lower your risk for heart disease. · Do not smoke. Smoking increases your risk for heart attack and stroke. If you need help quitting, talk to your doctor about stop-smoking programs and medicines. These can increase your chances of quitting for good. When should you call for help? Call 911 anytime you think you may need emergency care. This may mean having symptoms that suggest that your blood pressure is causing a serious heart or blood vessel problem. Your blood pressure may be over 180/110. ? For example, call 911 if: 
? · You have symptoms of a heart attack. These may include: ¨ Chest pain or pressure, or a strange feeling in the chest. 
¨ Sweating. ¨ Shortness of breath. ¨ Nausea or vomiting. ¨ Pain, pressure, or a strange feeling in the back, neck, jaw, or upper belly or in one or both shoulders or arms. ¨ Lightheadedness or sudden weakness. ¨ A fast or irregular heartbeat. ? · You have symptoms of a stroke. These may include: 
¨ Sudden numbness, tingling, weakness, or loss of movement in your face, arm, or leg, especially on only one side of your body. ¨ Sudden vision changes. ¨ Sudden trouble speaking. ¨ Sudden confusion or trouble understanding simple statements. ¨ Sudden problems with walking or balance. ¨ A sudden, severe headache that is different from past headaches. ? · You have severe back or belly pain. ?Do not wait until your blood pressure comes down on its own. Get help right away. ?Call your doctor now or seek immediate care if: 
? · Your blood pressure is much higher than normal (such as 180/110 or higher), but you don't have symptoms. ? · You think high blood pressure is causing symptoms, such as: ¨ Severe headache. ¨ Blurry vision. ? Watch closely for changes in your health, and be sure to contact your doctor if: 
? · Your blood pressure measures 140/90 or higher at least 2 times. That means the top number is 140 or higher or the bottom number is 90 or higher, or both. ? · You think you may be having side effects from your blood pressure medicine. ? · Your blood pressure is usually normal, but it goes above normal at least 2 times. Where can you learn more? Go to http://lora-adiel.info/. Enter Q168 in the search box to learn more about \"High Blood Pressure: Care Instructions. \" Current as of: September 21, 2016 Content Version: 11.4 © 2988-0857 [employer]. Care instructions adapted under license by [employer] (which disclaims liability or warranty for this information). If you have questions about a medical condition or this instruction, always ask your healthcare professional. [place] [address] any warranty or liability for your use of this information. Low Sodium Diet (2,000 Milligram): Care Instructions Your Care Instructions Too much sodium causes your body to hold on to extra water. This can raise your blood pressure and force your heart and kidneys to work harder. In very serious cases, this could cause you to be put in the hospital. It might even be life-threatening. By limiting sodium, you will feel better and lower your risk of serious problems. The most common source of sodium is salt. People get most of the salt in their diet from canned, prepared, and packaged foods. Fast food and restaurant meals also are very high in sodium. Your doctor will probably limit your sodium to less than 2,000 milligrams (mg) a day. This limit counts all the sodium in prepared and packaged foods and any salt you add to your food. Follow-up care is a key part of your treatment and safety. Be sure to make and go to all appointments, and call your doctor if you are having problems. It's also a good idea to know your test results and keep a list of the medicines you take. How can you care for yourself at home? Read food labels · Read labels on cans and food packages. The labels tell you how much sodium is in each serving. Make sure that you look at the serving size. If you eat more than the serving size, you have eaten more sodium. · Food labels also tell you the Percent Daily Value for sodium. Choose products with low Percent Daily Values for sodium. · Be aware that sodium can come in forms other than salt, including monosodium glutamate (MSG), sodium citrate, and sodium bicarbonate (baking soda). MSG is often added to Asian food. When you eat out, you can sometimes ask for food without MSG or added salt. Buy low-sodium foods · Buy foods that are labeled \"unsalted\" (no salt added), \"sodium-free\" (less than 5 mg of sodium per serving), or \"low-sodium\" (less than 140 mg of sodium per serving). Foods labeled \"reduced-sodium\" and \"light sodium\" may still have too much sodium. Be sure to read the label to see how much sodium you are getting. · Buy fresh vegetables, or frozen vegetables without added sauces. Buy low-sodium versions of canned vegetables, soups, and other canned goods. Prepare low-sodium meals · Cut back on the amount of salt you use in cooking. This will help you adjust to the taste. Do not add salt after cooking.  One teaspoon of salt has about 2,300 mg of sodium. · Take the salt shaker off the table. · Flavor your food with garlic, lemon juice, onion, vinegar, herbs, and spices. Do not use soy sauce, lite soy sauce, steak sauce, onion salt, garlic salt, celery salt, mustard, or ketchup on your food. · Use low-sodium salad dressings, sauces, and ketchup. Or make your own salad dressings and sauces without adding salt. · Use less salt (or none) when recipes call for it. You can often use half the salt a recipe calls for without losing flavor. Other foods such as rice, pasta, and grains do not need added salt. · Rinse canned vegetables, and cook them in fresh water. This removes some-but not all-of the salt. · Avoid water that is naturally high in sodium or that has been treated with water softeners, which add sodium. Call your local water company to find out the sodium content of your water supply. If you buy bottled water, read the label and choose a sodium-free brand. Avoid high-sodium foods · Avoid eating: ¨ Smoked, cured, salted, and canned meat, fish, and poultry. ¨ Ham, fregoso, hot dogs, and luncheon meats. ¨ Regular, hard, and processed cheese and regular peanut butter. ¨ Crackers with salted tops, and other salted snack foods such as pretzels, chips, and salted popcorn. ¨ Frozen prepared meals, unless labeled low-sodium. ¨ Canned and dried soups, broths, and bouillon, unless labeled sodium-free or low-sodium. ¨ Canned vegetables, unless labeled sodium-free or low-sodium. ¨ [place] fries, pizza, tacos, and other fast foods. ¨ Pickles, olives, ketchup, and other condiments, especially soy sauce, unless labeled sodium-free or low-sodium. Where can you learn more? Go to http://lora-adiel.info/. Enter K977 in the search box to learn more about \"Low Sodium Diet (2,000 Milligram): Care Instructions. \" Current as of: May 12, 2017 Content Version: 11.4 © 8340-1268 [employer]. Care instructions adapted under license by [employer] (which disclaims liability or warranty for this information). If you have questions about a medical condition or this instruction, always ask your healthcare professional. [address] any warranty or liability for your use of this information. Introducing [facility]! [address] introduces [employer] patient portal. Now you can access parts of your medical record, email your doctor's office, and request medication refills online. 1. In your internet browser, go to [email or web address] 2. Click on the First Time User? Click Here link in the Sign In box. You will see the New Member Sign Up page. 3. Enter your [employer] Access Code exactly as it appears below. You will not need to use this code after youve completed the sign-up process. If you do not sign up before the expiration date, you must request a new code. · [employer] Access Code: [email or web address] Expires: 4/29/2018  5:08 PM 
 
4. Enter the last four digits of your Social Security Number (xxxx) and Date of Birth (mm/dd/yyyy) as indicated and click Submit. You will be taken to the next sign-up page. 5. Create a [employer] ID. This will be your [employer] login ID and cannot be changed, so think of one that is secure and easy to remember. 6. Create a [employer] password. You can change your password at any time. 7. Enter your Password Reset Question and Answer. This can be used at a later time if you forget your password. 8. Enter your e-mail address. You will receive e-mail notification when new information is available in [address] 9. Click Sign Up. You can now view and download portions of your medical record. 10. Click the Download Summary menu link to download a portable copy of your medical information.  
 
If you have questions, please visit the Frequently Asked Questions section of the [employer]. Remember, [employer] is NOT to be used for urgent needs. For medical emergencies, dial 911. Now available from your iPhone and Android! Please provide this summary of care documentation to your next provider. Your primary care clinician is listed as Pancho Gonzales. If you have any questions after today's visit, please call [phone].

## 2018-01-29 NOTE — PATIENT INSTRUCTIONS
DASH Diet: Care Instructions  Your Care Instructions    The DASH diet is an eating plan that can help lower your blood pressure. DASH stands for Dietary Approaches to Stop Hypertension. Hypertension is high blood pressure. The DASH diet focuses on eating foods that are high in calcium, potassium, and magnesium. These nutrients can lower blood pressure. The foods that are highest in these nutrients are fruits, vegetables, low-fat dairy products, nuts, seeds, and legumes. But taking calcium, potassium, and magnesium supplements instead of eating foods that are high in those nutrients does not have the same effect. The DASH diet also includes whole grains, fish, and poultry. The DASH diet is one of several lifestyle changes your doctor may recommend to lower your high blood pressure. Your doctor may also want you to decrease the amount of sodium in your diet. Lowering sodium while following the DASH diet can lower blood pressure even further than just the DASH diet alone. Follow-up care is a key part of your treatment and safety. Be sure to make and go to all appointments, and call your doctor if you are having problems. It's also a good idea to know your test results and keep a list of the medicines you take. How can you care for yourself at home? Following the DASH diet  · Eat 4 to 5 servings of fruit each day. A serving is 1 medium-sized piece of fruit, ½ cup chopped or canned fruit, 1/4 cup dried fruit, or 4 ounces (½ cup) of fruit juice. Choose fruit more often than fruit juice. · Eat 4 to 5 servings of vegetables each day. A serving is 1 cup of lettuce or raw leafy vegetables, ½ cup of chopped or cooked vegetables, or 4 ounces (½ cup) of vegetable juice. Choose vegetables more often than vegetable juice. · Get 2 to 3 servings of low-fat and fat-free dairy each day. A serving is 8 ounces of milk, 1 cup of yogurt, or 1 ½ ounces of cheese. · Eat 6 to 8 servings of grains each day.  A serving is 1 slice of bread, 1 ounce of dry cereal, or ½ cup of cooked rice, pasta, or cooked cereal. Try to choose whole-grain products as much as possible. · Limit lean meat, poultry, and fish to 2 servings each day. A serving is 3 ounces, about the size of a deck of cards. · Eat 4 to 5 servings of nuts, seeds, and legumes (cooked dried beans, lentils, and split peas) each week. A serving is 1/3 cup of nuts, 2 tablespoons of seeds, or ½ cup of cooked beans or peas. · Limit fats and oils to 2 to 3 servings each day. A serving is 1 teaspoon of vegetable oil or 2 tablespoons of salad dressing. · Limit sweets and added sugars to 5 servings or less a week. A serving is 1 tablespoon jelly or jam, ½ cup sorbet, or 1 cup of lemonade. · Eat less than 2,300 milligrams (mg) of sodium a day. If you limit your sodium to 1,500 mg a day, you can lower your blood pressure even more. Tips for success  · Start small. Do not try to make dramatic changes to your diet all at once. You might feel that you are missing out on your favorite foods and then be more likely to not follow the plan. Make small changes, and stick with them. Once those changes become habit, add a few more changes. · Try some of the following:  ¨ Make it a goal to eat a fruit or vegetable at every meal and at snacks. This will make it easy to get the recommended amount of fruits and vegetables each day. ¨ Try yogurt topped with fruit and nuts for a snack or healthy dessert. ¨ Add lettuce, tomato, cucumber, and onion to sandwiches. ¨ Combine a ready-made pizza crust with low-fat mozzarella cheese and lots of vegetable toppings. Try using tomatoes, squash, spinach, broccoli, carrots, cauliflower, and onions. ¨ Have a variety of cut-up vegetables with a low-fat dip as an appetizer instead of chips and dip. ¨ Sprinkle sunflower seeds or chopped almonds over salads. Or try adding chopped walnuts or almonds to cooked vegetables.   ¨ Try some vegetarian meals using beans and peas. Add garbanzo or kidney beans to salads. Make burritos and tacos with mashed figueroa beans or black beans. Where can you learn more? Go to http://lora-adiel.info/. Enter H668 in the search box to learn more about \"DASH Diet: Care Instructions. \"  Current as of: September 21, 2016  Content Version: 11.4  © 3170-3489 [employer]. Care instructions adapted under license by [employer] (which disclaims liability or warranty for this information). If you have questions about a medical condition or this instruction, always ask your healthcare professional. [address] any warranty or liability for your use of this information. Learning About Carbohydrates  What are carbohydrates? Carbohydrates are an important nutrient you get from food. It's a great source of energy for your body and helps your brain and nervous system work properly. How does your body use carbohydrates? After you eat food with carbs in it, your body digests the carbohydrates and turns them into a kind of sugar that goes into your blood. The blood carries this sugar to the cells in your body. The cells use the sugar to give you energy. Extra sugar is stored in the cells for later use. If it isn't used, it turns into fat. Where do carbohydrates come from? The healthiest carbohydrate choices are breads, cereals, and pastas made with whole grains; brown rice; low-fat dairy products; vegetables; legumes such as peas, lentils, and beans; and fruits. Foods made from refined flour, including bread, pasta, doughnuts, cookies, and desserts, also contain carbohydrates. So do sweets such as candy and soda. How can you get the right kind and amount of carbs? Eating too much of anything can lead to weight gain. And that can lead to other health problems.   Here are some tips to help you eat the right amount of the right kind of carbs so you have the nutrition and the energy you need:  · Eat 3 to 8 servings of grains (breads, cereals, rice, pasta) each day. For example, a serving is 1 slice of bread, 1 cup of boxed cereal, or ½ cup of cooked rice, cooked pasta, or cooked cereal. Go to www. choosemyplate.gov to learn how many servings you need. ¨ Buy bread that lists whole wheat (or other whole grains), stone-ground wheat, or cracked wheat as the first ingredient. ¨ Eat brown rice, bulgur, or millet instead of white rice. ¨ Eat pasta and cereals made from whole grain flour instead of refined flour. · Eat several servings a day of fresh fruits and vegetables. These include raspberries, apples, figs, oranges, pears, prunes, broccoli, brussels sprouts, carrots, corn, peas, and beans. And there are lots of other fruits and vegetables to choose from. · Limit the amount of candy, desserts, and soda in your diet. Where can you learn more? Go to http://loraDokogeoadiel.info/. Enter F199 in the search box to learn more about \"Learning About Carbohydrates. \"  Current as of: May 12, 2017  Content Version: 11.4  © 7733-2260 [employer]. Care instructions adapted under license by [employer] (which disclaims liability or warranty for this information). If you have questions about a medical condition or this instruction, always ask your healthcare professional. [place] [address] any warranty or liability for your use of this information. Learning About Low-Carbohydrate Diets for Weight Loss  What is a low-carbohydrate diet? Low-carb diets avoid foods that are high in carbohydrate. These high-carb foods include pasta, bread, rice, cereal, fruits, and starchy vegetables. Instead, these diets usually have you eat foods that are high in fat and protein. Many people lose weight quickly on a low-carb diet. But the early weight loss is water. People on this diet often gain the weight back after they start eating carbs again.   Not all diet plans are safe or work well. A lot of the evidence shows that low-carb diets aren't healthy. That's because these diets often don't include healthy foods like fruits and vegetables. Losing weight safely means balancing protein, fat, and carbs with every meal and snack. And low-carb diets don't always provide the vitamins, minerals, and fiber you need. If you have a serious medical condition, talk to your doctor before you try any diet. These conditions include kidney disease, heart disease, type 2 diabetes, high cholesterol, and high blood pressure. If you are pregnant, it may not be safe for your baby if you are on a low-carb diet. How can you lose weight safely? You might have heard that a diet plan helped another person lose weight. But that doesn't mean that it will work for you. It is very hard to stay on a diet that includes lots of big changes in your eating habits. If you want to get to a healthy weight and stay there, making healthy lifestyle changes will often work better than dieting. These steps can help. · Make a plan for change. Work with your doctor to create a plan that is right for you. · See a dietitian. He or she can show you how to make healthy changes in your eating habits. · Manage stress. If you have a lot of stress in your life, it can be hard to focus on making healthy changes to your daily habits. · Track your food and activity. You are likely to do better at losing weight if you keep track of what you eat and what you do. Follow-up care is a key part of your treatment and safety. Be sure to make and go to all appointments, and call your doctor if you are having problems. It's also a good idea to know your test results and keep a list of the medicines you take. Where can you learn more? Go to http://lora-adiel.info/. Enter A121 in the search box to learn more about \"Learning About Low-Carbohydrate Diets for Weight Loss. \"  Current as of:  May 12, 2017  Content Version: 11.4  © 4921-9258 [employer]. Care instructions adapted under license by [employer] (which disclaims liability or warranty for this information). If you have questions about a medical condition or this instruction, always ask your healthcare professional. [address] any warranty or liability for your use of this information. DASH Diet: Care Instructions  Your Care Instructions    The DASH diet is an eating plan that can help lower your blood pressure. DASH stands for Dietary Approaches to Stop Hypertension. Hypertension is high blood pressure. The DASH diet focuses on eating foods that are high in calcium, potassium, and magnesium. These nutrients can lower blood pressure. The foods that are highest in these nutrients are fruits, vegetables, low-fat dairy products, nuts, seeds, and legumes. But taking calcium, potassium, and magnesium supplements instead of eating foods that are high in those nutrients does not have the same effect. The DASH diet also includes whole grains, fish, and poultry. The DASH diet is one of several lifestyle changes your doctor may recommend to lower your high blood pressure. Your doctor may also want you to decrease the amount of sodium in your diet. Lowering sodium while following the DASH diet can lower blood pressure even further than just the DASH diet alone. Follow-up care is a key part of your treatment and safety. Be sure to make and go to all appointments, and call your doctor if you are having problems. It's also a good idea to know your test results and keep a list of the medicines you take. How can you care for yourself at home? Following the DASH diet  · Eat 4 to 5 servings of fruit each day. A serving is 1 medium-sized piece of fruit, ½ cup chopped or canned fruit, 1/4 cup dried fruit, or 4 ounces (½ cup) of fruit juice. Choose fruit more often than fruit juice.   · Eat 4 to 5 servings of vegetables each day. A serving is 1 cup of lettuce or raw leafy vegetables, ½ cup of chopped or cooked vegetables, or 4 ounces (½ cup) of vegetable juice. Choose vegetables more often than vegetable juice. · Get 2 to 3 servings of low-fat and fat-free dairy each day. A serving is 8 ounces of milk, 1 cup of yogurt, or 1 ½ ounces of cheese. · Eat 6 to 8 servings of grains each day. A serving is 1 slice of bread, 1 ounce of dry cereal, or ½ cup of cooked rice, pasta, or cooked cereal. Try to choose whole-grain products as much as possible. · Limit lean meat, poultry, and fish to 2 servings each day. A serving is 3 ounces, about the size of a deck of cards. · Eat 4 to 5 servings of nuts, seeds, and legumes (cooked dried beans, lentils, and split peas) each week. A serving is 1/3 cup of nuts, 2 tablespoons of seeds, or ½ cup of cooked beans or peas. · Limit fats and oils to 2 to 3 servings each day. A serving is 1 teaspoon of vegetable oil or 2 tablespoons of salad dressing. · Limit sweets and added sugars to 5 servings or less a week. A serving is 1 tablespoon jelly or jam, ½ cup sorbet, or 1 cup of lemonade. · Eat less than 2,300 milligrams (mg) of sodium a day. If you limit your sodium to 1,500 mg a day, you can lower your blood pressure even more. Tips for success  · Start small. Do not try to make dramatic changes to your diet all at once. You might feel that you are missing out on your favorite foods and then be more likely to not follow the plan. Make small changes, and stick with them. Once those changes become habit, add a few more changes. · Try some of the following:  ¨ Make it a goal to eat a fruit or vegetable at every meal and at snacks. This will make it easy to get the recommended amount of fruits and vegetables each day. ¨ Try yogurt topped with fruit and nuts for a snack or healthy dessert. ¨ Add lettuce, tomato, cucumber, and onion to sandwiches.   ¨ Combine a ready-made pizza crust with low-fat mozzarella cheese and lots of vegetable toppings. Try using tomatoes, squash, spinach, broccoli, carrots, cauliflower, and onions. ¨ Have a variety of cut-up vegetables with a low-fat dip as an appetizer instead of chips and dip. ¨ Sprinkle sunflower seeds or chopped almonds over salads. Or try adding chopped walnuts or almonds to cooked vegetables. ¨ Try some vegetarian meals using beans and peas. Add garbanzo or kidney beans to salads. Make burritos and tacos with mashed figueroa beans or black beans. Where can you learn more? Go to http://lora-adiel.info/. Enter Q966 in the search box to learn more about \"DASH Diet: Care Instructions. \"  Current as of: September 21, 2016  Content Version: 11.4  © 4492-2243 [employer]. Care instructions adapted under license by [employer] (which disclaims liability or warranty for this information). If you have questions about a medical condition or this instruction, always ask your healthcare professional. [place] [address] any warranty or liability for your use of this information. High Blood Pressure: Care Instructions  Your Care Instructions    If your blood pressure is usually above 140/90, you have high blood pressure, or hypertension. That means the top number is 140 or higher or the bottom number is 90 or higher, or both. Despite what a lot of people think, high blood pressure usually doesn't cause headaches or make you feel dizzy or lightheaded. It usually has no symptoms. But it does increase your risk for heart attack, stroke, and kidney or eye damage. The higher your blood pressure, the more your risk increases. Your doctor will give you a goal for your blood pressure. Your goal will be based on your health and your age. An example of a goal is to keep your blood pressure below 140/90.   Lifestyle changes, such as eating healthy and being active, are always important to help lower blood pressure. You might also take medicine to reach your blood pressure goal.  Follow-up care is a key part of your treatment and safety. Be sure to make and go to all appointments, and call your doctor if you are having problems. It's also a good idea to know your test results and keep a list of the medicines you take. How can you care for yourself at home? Medical treatment  · If you stop taking your medicine, your blood pressure will go back up. You may take one or more types of medicine to lower your blood pressure. Be safe with medicines. Take your medicine exactly as prescribed. Call your doctor if you think you are having a problem with your medicine. · Talk to your doctor before you start taking aspirin every day. Aspirin can help certain people lower their risk of a heart attack or stroke. But taking aspirin isn't right for everyone, because it can cause serious bleeding. · See your doctor regularly. You may need to see the doctor more often at first or until your blood pressure comes down. · If you are taking blood pressure medicine, talk to your doctor before you take decongestants or anti-inflammatory medicine, such as ibuprofen. Some of these medicines can raise blood pressure. · Learn how to check your blood pressure at home. Lifestyle changes  · Stay at a healthy weight. This is especially important if you put on weight around the waist. Losing even 10 pounds can help you lower your blood pressure. · If your doctor recommends it, get more exercise. Walking is a good choice. Bit by bit, increase the amount you walk every day. Try for at least 30 minutes on most days of the week. You also may want to swim, bike, or do other activities. · Avoid or limit alcohol. Talk to your doctor about whether you can drink any alcohol. · Try to limit how much sodium you eat to less than 2,300 milligrams (mg) a day. Your doctor may ask you to try to eat less than 1,500 mg a day.   · Eat plenty of fruits (such as bananas and oranges), vegetables, legumes, whole grains, and low-fat dairy products. · Lower the amount of saturated fat in your diet. Saturated fat is found in animal products such as milk, cheese, and meat. Limiting these foods may help you lose weight and also lower your risk for heart disease. · Do not smoke. Smoking increases your risk for heart attack and stroke. If you need help quitting, talk to your doctor about stop-smoking programs and medicines. These can increase your chances of quitting for good. When should you call for help? Call 911 anytime you think you may need emergency care. This may mean having symptoms that suggest that your blood pressure is causing a serious heart or blood vessel problem. Your blood pressure may be over 180/110. ? For example, call 911 if:  ? · You have symptoms of a heart attack. These may include:  ¨ Chest pain or pressure, or a strange feeling in the chest.  ¨ Sweating. ¨ Shortness of breath. ¨ Nausea or vomiting. ¨ Pain, pressure, or a strange feeling in the back, neck, jaw, or upper belly or in one or both shoulders or arms. ¨ Lightheadedness or sudden weakness. ¨ A fast or irregular heartbeat. ? · You have symptoms of a stroke. These may include:  ¨ Sudden numbness, tingling, weakness, or loss of movement in your face, arm, or leg, especially on only one side of your body. ¨ Sudden vision changes. ¨ Sudden trouble speaking. ¨ Sudden confusion or trouble understanding simple statements. ¨ Sudden problems with walking or balance. ¨ A sudden, severe headache that is different from past headaches. ? · You have severe back or belly pain. ?Do not wait until your blood pressure comes down on its own. Get help right away. ?Call your doctor now or seek immediate care if:  ? · Your blood pressure is much higher than normal (such as 180/110 or higher), but you don't have symptoms.    ? · You think high blood pressure is causing symptoms, such as:  ¨ Severe headache. ¨ Blurry vision. ? Watch closely for changes in your health, and be sure to contact your doctor if:  ? · Your blood pressure measures 140/90 or higher at least 2 times. That means the top number is 140 or higher or the bottom number is 90 or higher, or both. ? · You think you may be having side effects from your blood pressure medicine. ? · Your blood pressure is usually normal, but it goes above normal at least 2 times. Where can you learn more? Go to http://lora-adiel.info/. Enter Y755 in the search box to learn more about \"High Blood Pressure: Care Instructions. \"  Current as of: September 21, 2016  Content Version: 11.4  © 0443-1707 [employer]. Care instructions adapted under license by [employer] (which disclaims liability or warranty for this information). If you have questions about a medical condition or this instruction, always ask your healthcare professional. [place] [address] any warranty or liability for your use of this information. Low Sodium Diet (2,000 Milligram): Care Instructions  Your Care Instructions    Too much sodium causes your body to hold on to extra water. This can raise your blood pressure and force your heart and kidneys to work harder. In very serious cases, this could cause you to be put in the hospital. It might even be life-threatening. By limiting sodium, you will feel better and lower your risk of serious problems. The most common source of sodium is salt. People get most of the salt in their diet from canned, prepared, and packaged foods. Fast food and restaurant meals also are very high in sodium. Your doctor will probably limit your sodium to less than 2,000 milligrams (mg) a day. This limit counts all the sodium in prepared and packaged foods and any salt you add to your food. Follow-up care is a key part of your treatment and safety.  Be sure to make and go to all appointments, and call your doctor if you are having problems. It's also a good idea to know your test results and keep a list of the medicines you take. How can you care for yourself at home? Read food labels  · Read labels on cans and food packages. The labels tell you how much sodium is in each serving. Make sure that you look at the serving size. If you eat more than the serving size, you have eaten more sodium. · Food labels also tell you the Percent Daily Value for sodium. Choose products with low Percent Daily Values for sodium. · Be aware that sodium can come in forms other than salt, including monosodium glutamate (MSG), sodium citrate, and sodium bicarbonate (baking soda). MSG is often added to Asian food. When you eat out, you can sometimes ask for food without MSG or added salt. Buy low-sodium foods  · Buy foods that are labeled \"unsalted\" (no salt added), \"sodium-free\" (less than 5 mg of sodium per serving), or \"low-sodium\" (less than 140 mg of sodium per serving). Foods labeled \"reduced-sodium\" and \"light sodium\" may still have too much sodium. Be sure to read the label to see how much sodium you are getting. · Buy fresh vegetables, or frozen vegetables without added sauces. Buy low-sodium versions of canned vegetables, soups, and other canned goods. Prepare low-sodium meals  · Cut back on the amount of salt you use in cooking. This will help you adjust to the taste. Do not add salt after cooking. One teaspoon of salt has about 2,300 mg of sodium. · Take the salt shaker off the table. · Flavor your food with garlic, lemon juice, onion, vinegar, herbs, and spices. Do not use soy sauce, lite soy sauce, steak sauce, onion salt, garlic salt, celery salt, mustard, or ketchup on your food. · Use low-sodium salad dressings, sauces, and ketchup. Or make your own salad dressings and sauces without adding salt. · Use less salt (or none) when recipes call for it.  You can often use half the salt a recipe calls for without losing flavor. Other foods such as rice, pasta, and grains do not need added salt. · Rinse canned vegetables, and cook them in fresh water. This removes some-but not all-of the salt. · Avoid water that is naturally high in sodium or that has been treated with water softeners, which add sodium. Call your local water company to find out the sodium content of your water supply. If you buy bottled water, read the label and choose a sodium-free brand. Avoid high-sodium foods  · Avoid eating:  ¨ Smoked, cured, salted, and canned meat, fish, and poultry. ¨ Ham, fregoso, hot dogs, and luncheon meats. ¨ Regular, hard, and processed cheese and regular peanut butter. ¨ Crackers with salted tops, and other salted snack foods such as pretzels, chips, and salted popcorn. ¨ Frozen prepared meals, unless labeled low-sodium. ¨ Canned and dried soups, broths, and bouillon, unless labeled sodium-free or low-sodium. ¨ Canned vegetables, unless labeled sodium-free or low-sodium. ¨ [place] fries, pizza, tacos, and other fast foods. ¨ Pickles, olives, ketchup, and other condiments, especially soy sauce, unless labeled sodium-free or low-sodium. Where can you learn more? Go to http://lora-adiel.info/. Enter S435 in the search box to learn more about \"Low Sodium Diet (2,000 Milligram): Care Instructions. \"  Current as of: May 12, 2017  Content Version: 11.4  © 6585-2435 [employer]. Care instructions adapted under license by [employer] (which disclaims liability or warranty for this information). If you have questions about a medical condition or this instruction, always ask your healthcare professional. [place] [address] any warranty or liability for your use of this information.

## 2018-01-29 NOTE — PROGRESS NOTES
Chief Complaint   Patient presents with    Hypertension     follow up    Thyroid Problem     follow up     1. Have you been to the ER, urgent care clinic since your last visit? Hospitalized since your last visit? No    2. Have you seen or consulted any other health care providers outside of the [address] [place] [address] since your last visit? Include any pap smears or colon screening. No    Order placed for flu vaccine, per Verbal Order from Dr. Brent Suggs on 1/29/2018 due to need for flu vaccine  Flu vaccination given to left deltoid. Tolerated well, no reaction noted.

## 2018-01-30 LAB
ALBUMIN SERPL-MCNC: 4.1 G/DL (ref 3.5–5.5)
ALBUMIN/GLOB SERPL: 1 {RATIO} (ref 1.2–2.2)
ALP SERPL-CCNC: 90 IU/L (ref 39–117)
ALT SERPL-CCNC: 26 IU/L (ref 0–32)
AST SERPL-CCNC: 18 IU/L (ref 0–40)
BILIRUB SERPL-MCNC: 0.3 MG/DL (ref 0–1.2)
BUN SERPL-MCNC: 17 MG/DL (ref 6–24)
BUN/CREAT SERPL: 30 (ref 9–23)
CALCIUM SERPL-MCNC: 9.4 MG/DL (ref 8.7–10.2)
CHLORIDE SERPL-SCNC: 98 MMOL/L (ref 96–106)
CHOLEST SERPL-MCNC: 147 MG/DL (ref 100–199)
CO2 SERPL-SCNC: 26 MMOL/L (ref 18–29)
CREAT SERPL-MCNC: 0.57 MG/DL (ref 0.57–1)
ERYTHROCYTE [DISTWIDTH] IN BLOOD BY AUTOMATED COUNT: 14.2 % (ref 12.3–15.4)
GFR SERPLBLD CREATININE-BSD FMLA CKD-EPI: 104 ML/MIN/1.73
GFR SERPLBLD CREATININE-BSD FMLA CKD-EPI: 120 ML/MIN/1.73
GLOBULIN SER CALC-MCNC: 4 G/DL (ref 1.5–4.5)
GLUCOSE SERPL-MCNC: 102 MG/DL (ref 65–99)
HCT VFR BLD AUTO: 40.2 % (ref 34–46.6)
HCV AB S/CO SERPL IA: <0.1 S/CO RATIO (ref 0–0.9)
HDLC SERPL-MCNC: 36 MG/DL
HGB BLD-MCNC: 13.1 G/DL (ref 11.1–15.9)
MCH RBC QN AUTO: 26.8 PG (ref 26.6–33)
MCHC RBC AUTO-ENTMCNC: 32.6 G/DL (ref 31.5–35.7)
MCV RBC AUTO: 82 FL (ref 79–97)
PLATELET # BLD AUTO: 277 X10E3/UL (ref 150–379)
POTASSIUM SERPL-SCNC: 3.5 MMOL/L (ref 3.5–5.2)
PROT SERPL-MCNC: 8.1 G/DL (ref 6–8.5)
RBC # BLD AUTO: 4.89 X10E6/UL (ref 3.77–5.28)
SODIUM SERPL-SCNC: 141 MMOL/L (ref 134–144)
TSH SERPL DL<=0.005 MIU/L-ACNC: 1.28 UIU/ML (ref 0.45–4.5)
WBC # BLD AUTO: 10.8 X10E3/UL (ref 3.4–10.8)

## 2018-02-27 LAB — HEMOCCULT STL QL IA: NEGATIVE

## 2019-01-28 DIAGNOSIS — E78.00 HYPERCHOLESTEREMIA: ICD-10-CM

## 2019-01-28 DIAGNOSIS — E03.8 OTHER SPECIFIED HYPOTHYROIDISM: ICD-10-CM

## 2019-01-28 DIAGNOSIS — I10 HTN, GOAL BELOW 130/80: ICD-10-CM

## 2019-01-29 RX ORDER — NEBIVOLOL 5 MG/1
TABLET ORAL
Qty: 30 TAB | Refills: 10 | Status: SHIPPED | OUTPATIENT
Start: 2019-01-29 | End: 2019-02-14 | Stop reason: SDUPTHER

## 2019-02-14 ENCOUNTER — OFFICE VISIT (OUTPATIENT)
Dept: FAMILY MEDICINE CLINIC | Age: 59
End: 2019-02-14

## 2019-02-14 VITALS
BODY MASS INDEX: 33.45 KG/M2 | HEIGHT: 62 IN | OXYGEN SATURATION: 97 % | DIASTOLIC BLOOD PRESSURE: 93 MMHG | RESPIRATION RATE: 18 BRPM | TEMPERATURE: 97.2 F | HEART RATE: 77 BPM | WEIGHT: 181.8 LBS | SYSTOLIC BLOOD PRESSURE: 144 MMHG

## 2019-02-14 DIAGNOSIS — E78.00 HYPERCHOLESTEREMIA: ICD-10-CM

## 2019-02-14 DIAGNOSIS — Z12.31 SCREENING MAMMOGRAM, ENCOUNTER FOR: ICD-10-CM

## 2019-02-14 DIAGNOSIS — E03.8 OTHER SPECIFIED HYPOTHYROIDISM: ICD-10-CM

## 2019-02-14 DIAGNOSIS — I10 HTN, GOAL BELOW 130/80: ICD-10-CM

## 2019-02-14 LAB
BILIRUB UR QL STRIP: NEGATIVE
GLUCOSE UR-MCNC: NEGATIVE MG/DL
KETONES P FAST UR STRIP-MCNC: NEGATIVE MG/DL
PH UR STRIP: 7 [PH] (ref 4.6–8)
PROT UR QL STRIP: NEGATIVE
SP GR UR STRIP: 1.01 (ref 1–1.03)
UA UROBILINOGEN AMB POC: NORMAL (ref 0.2–1)
URINALYSIS CLARITY POC: CLEAR
URINALYSIS COLOR POC: YELLOW
URINE BLOOD POC: NEGATIVE
URINE LEUKOCYTES POC: NORMAL
URINE NITRITES POC: NEGATIVE

## 2019-02-14 RX ORDER — OLMESARTAN MEDOXOMIL, AMLODIPINE AND HYDROCHLOROTHIAZIDE TABLET 40/10/25 MG 40; 10; 25 MG/1; MG/1; MG/1
TABLET ORAL
Qty: 30 TAB | Refills: 11 | Status: SHIPPED | OUTPATIENT
Start: 2019-02-14 | End: 2020-10-16

## 2019-02-14 RX ORDER — LEVOTHYROXINE SODIUM 50 UG/1
50 TABLET ORAL
Qty: 30 TAB | Refills: 11 | Status: SHIPPED | OUTPATIENT
Start: 2019-02-14 | End: 2020-10-16

## 2019-02-14 RX ORDER — NEBIVOLOL 5 MG/1
TABLET ORAL
Qty: 30 TAB | Refills: 11 | Status: SHIPPED | OUTPATIENT
Start: 2019-02-14 | End: 2020-10-16

## 2019-02-14 NOTE — PROGRESS NOTES
Name and  verified Chief Complaint Patient presents with  Hypertension f/u  Thyroid Problem f/u Health Maintenance reviewed-discussed with patient. 1. Have you been to the ER, urgent care clinic since your last visit? Hospitalized since your last visit? no 
 
2. Have you seen or consulted any other health care providers outside of the [address] [place] [address] since your last visit? Include any pap smears or colon screening. Yes, urology 2018 office visit and mammogram 2019.

## 2019-02-14 NOTE — PATIENT INSTRUCTIONS
Body Mass Index: Care Instructions Your Care Instructions Body mass index (BMI) can help you see if your weight is raising your risk for health problems. It uses a formula to compare how much you weigh with how tall you are. · A BMI lower than 18.5 is considered underweight. · A BMI between 18.5 and 24.9 is considered healthy. · A BMI between 25 and 29.9 is considered overweight. A BMI of 30 or higher is considered obese. If your BMI is in the normal range, it means that you have a lower risk for weight-related health problems. If your BMI is in the overweight or obese range, you may be at increased risk for weight-related health problems, such as high blood pressure, heart disease, stroke, arthritis or joint pain, and diabetes. If your BMI is in the underweight range, you may be at increased risk for health problems such as fatigue, lower protection (immunity) against illness, muscle loss, bone loss, hair loss, and hormone problems. BMI is just one measure of your risk for weight-related health problems. You may be at higher risk for health problems if you are not active, you eat an unhealthy diet, or you drink too much alcohol or use tobacco products. Follow-up care is a key part of your treatment and safety. Be sure to make and go to all appointments, and call your doctor if you are having problems. It's also a good idea to know your test results and keep a list of the medicines you take. How can you care for yourself at home? · Practice healthy eating habits. This includes eating plenty of fruits, vegetables, whole grains, lean protein, and low-fat dairy. · If your doctor recommends it, get more exercise. Walking is a good choice. Bit by bit, increase the amount you walk every day. Try for at least 30 minutes on most days of the week. · Do not smoke. Smoking can increase your risk for health problems.  If you need help quitting, talk to your doctor about stop-smoking programs and medicines. These can increase your chances of quitting for good. · Limit alcohol to 2 drinks a day for men and 1 drink a day for women. Too much alcohol can cause health problems. If you have a BMI higher than 25 Your doctor may do other tests to check your risk for weight-related health problems. This may include measuring the distance around your waist. A waist measu Hypothyroidism: Care Instructions Your Care Instructions You have hypothyroidism, which means that your body is not making enough thyroid hormone. This hormone helps your body use energy. If your thyroid level is low, you may feel tired, be constipated, have an increase in your blood pressure, or have dry skin or memory problems. You may also get cold easily, even when it is warm. Women with low thyroid levels may have heavy menstrual periods. A blood test to find your thyroid-stimulating hormone (TSH) level is used to check for hypothyroidism. A high TSH level may mean that you have low thyroid. When your body is not making enough thyroid hormone, TSH levels rise in an effort to make the body produce more. The treatment for hypothyroidism is to take thyroid hormone pills. You should start to feel better in 1 to 2 weeks. But it can take several months to see changes in the TSH level. You will need regular visits with your doctor to make sure you have the right dose of medicine. Most people need treatment for the rest of their lives. You will need to see your doctor regularly to have blood tests and to make sure you are doing well. Follow-up care is a key part of your treatment and safety. Be sure to make and go to all appointments, and call your doctor if you are having problems. It's also a good idea to know your test results and keep a list of the medicines you take. How can you care for yourself at home? · Take your thyroid hormone medicine exactly as prescribed.  Call your doctor if you think you are having a problem with your medicine. Most people do not have side effects if they take the right amount of medicine regularly. ? Take the medicine 30 minutes before breakfast, and do not take it with calcium, vitamins, or iron. ? Do not take extra doses of your thyroid medicine. It will not help you get better any faster, and it may cause side effects. ? If you forget to take a dose, do NOT take a double dose of medicine. Take your usual dose the next day. · Tell your doctor about all prescription, herbal, or over-the-counter products you take. · Take care of yourself. Eat a healthy diet, get enough sleep, and get regular exercise. When should you call for help? Call 911 anytime you think you may need emergency care. For example, call if: 
  · You passed out (lost consciousness).  
  · You have severe trouble breathing.  
  · You have a very slow heartbeat (less than 60 beats a minute).  
  · You have a low body temperature (95°F or below).  
 Call your doctor now or seek immediate medical care if: 
  · You feel tired, sluggish, or weak.  
  · You have trouble remembering things or concentrating.  
  · You do not begin to feel better 2 weeks after starting your medicine.  
 Watch closely for changes in your health, and be sure to contact your doctor if you have any problems. Where can you learn more? Go to http://lora-adiel.info/. Enter R966 in the search box to learn more about \"Hypothyroidism: Care Instructions. \" Current as of: March 14, 2018 Content Version: 11.9 © 6979-3393 [employer]. Care instructions adapted under license by [employer] (which disclaims liability or warranty for this information). If you have questions about a medical condition or this instruction, always ask your healthcare professional. [address] any warranty or liability for your use of this information. · ement of more than 40 inches in men or 35 inches in women can increase the risk of weight-related health problems. · Talk with your doctor about steps you can take to stay healthy or improve your health. You may need to make lifestyle changes to lose weight and stay healthy, such as changing your diet and getting regular exercise. If you have a BMI lower than 18.5 · Your doctor may do other tests to check your risk for health problems. · Talk with your doctor about steps you can take to stay healthy or improve your health. You may need to make lifestyle changes to gain or maintain weight and stay healthy, such as getting more healthy foods in your diet and doing exercises to build muscle. Where can you lear Learning About the [address] Diet What is the Mediterranean diet? The Mediterranean diet is a style of eating rather than a diet plan. It features foods eaten in [place], [place], [place] and [place], and other countries along the [facility]. It emphasizes eating foods like fish, fruits, vegetables, beans, high-fiber breads and whole grains, nuts, and olive oil. This style of eating includes limited red meat, cheese, and sweets. Why choose the Mediterranean diet? A Mediterranean-style diet may improve heart health. It contains more fat than other heart-healthy diets. But the fats are mainly from nuts, unsaturated oils (such as fish oils and olive oil), and certain nut or seed oils (such as canola, soybean, or flaxseed oil). These fats may help protect the heart and blood vessels. How can you get started on the Mediterranean diet? Here are some things you can do to switch to a more Mediterranean way of eating. What to eat · Eat a variety of fruits and vegetables each day, such as grapes, blueberries, tomatoes, broccoli, peppers, figs, olives, spinach, eggplant, beans, lentils, and chickpeas.  
· Eat a variety of whole-grain foods each day, such as oats, brown rice, and whole wheat bread, pasta, and couscous. · Eat fish at least 2 times a week. Try tuna, salmon, mackerel, lake trout, herring, or sardines. · Eat moderate amounts of low-fat dairy products, such as milk, cheese, or yogurt. · Eat moderate amounts of poultry and eggs. · Choose healthy (unsaturated) fats, such as nuts, olive oil, and certain nut or seed oils like canola, soybean, and flaxseed. · Limit unhealthy (saturated) fats, such as butter, palm oil, and coconut oil. And limit fats found in animal products, such as meat and dairy products made with whole milk. Try to eat red meat only a few times a month in very small amounts. · Limit sweets and desserts to only a few times a week. This includes sugar-sweetened drinks like soda. The Mediterranean diet may also include red wine with your meal1 glass each day for women and up to 2 glasses a day for men. Tips for eating at home · Use herbs, spices, garlic, lemon zest, and citrus juice instead of salt to add flavor to foods. · Add avocado slices to your sandwich instead of fregoso. · Have fish for lunch or dinner instead of red meat. Brush the fish with olive oil, and broil or grill it. · Sprinkle your salad with seeds or nuts instead of cheese. · Cook with olive or canola oil instead of butter or oils that are high in saturated fat. · Switch from 2% milk or whole milk to 1% or fat-free milk. · Dip raw vegetables in a vinaigrette dressing or hummus instead of dips made from mayonnaise or sour cream. 
· Have a piece of fruit for dessert instead of a piece of cake. Try baked apples, or have some dried fruit. Tips for eating out · Try broiled, grilled, baked, or poached fish instead of having it fried or breaded. · Ask your  to have your meals prepared with olive oil instead of butter. · Order dishes made with marinara sauce or sauces made from olive oil. Avoid sauces made from cream or mayonnaise. · Choose whole-grain breads, whole wheat pasta and pizza crust, brown rice, beans, and lentils. · Cut back on butter or margarine on bread. Instead, you can dip your bread in a small amount of olive oil. · Ask for a side salad or grilled vegetables instead of french fries or chips. Where can you learn more? Go to http://loraMyEveTab.""/. Enter [phone] in the search box to learn more about \"Learning About the Mediterranean Diet. \" Current as of: March 28, 2018 Content Version: 11.9 © 0901-0849 [employer]. Care instructions adapted under license by [employer] (which disclaims liability or warranty for this information). If you have questions about a medical condition or this instruction, always ask your healthcare professional. [address] any warranty or liability for your use of this information. n more? Go to http://loraMyEveTab.""/. Enter S176 in the search box to learn more about \"Body Mass Index: Care Instructions. \" Current as of: October 13, 2016 Content Version: 11.4 © 7129-4933 [employer]. Care instructions adapted under license by [employer] (which disclaims liability or warranty for this information). If you have questions about a medical condition or this instruction, always ask your healthcare professional. [address] any warranty or liability for your use of this information.

## 2019-02-14 NOTE — PROGRESS NOTES
HISTORY OF PRESENT ILLNESS Violetta Dubin is a 63 y.o. female. HPI  
 
 
HTN Today pt present for Bp check and the patient stating that so far there has been a Compliancy w/ the bp meds, having had no low salt diet and try to the best of pt's knowledge to avoid smoked meats, the patient has been active life style and does do the daily walking,pt states that patien does obtain the bp at home few times a week and the average of  140/87, today the pt denies Chest Pain, has no legs swelling no lightheadedness, Hypercholestremia Patient has no personal + family history of  family history of early heart disease with the patient's parents and siblings,  Refusing to be on statin tx patient'saware last lipoprotein profile, blood sugar, TSH, liver and renal function tests, 
 
the patient eats out but not more than average,   and currently, there is no muscle nor abdominal pain, And patient fasting today, the patient is compliant w/ meds, in addition to the intake of daily baby aspirin Hypothyrodism No wt gain, no cold intolerance, no thyroid sx,  nl lipid, compliant with meds, last TSH was nl 
lmp many yrs ago hysterectomy >10yrs, no cigs no etoh,  
the pat has ++been feeling anxious at work, and  Has not been feeling stressed out, no s no h ideation,  
otherwise feeling better since the last visit Current Outpatient Medications Medication Sig Dispense Refill  nebivolol (BYSTOLIC) 5 mg tablet TAKE ONE TABLET BY MOUTH DAILY FOR HYPERTENSION 30 Tab 10  Olmesartan-amLODIPine-HCTZ (TRIBENZOR) 40-10-25 mg tab TAKE ONE TABLET BY MOUTH DAILY 30 Tab 11  
 levothyroxine (LEVOTHROID) 50 mcg tablet Take 1 Tab by mouth Daily (before breakfast). 30 Tab 11  
 Aspirin, Buffered 81 mg tab Take 1 Tab by mouth daily. 30 Tab 11 Allergies Allergen Reactions  Oxycontin [Oxycodone] Nausea and Vomiting  Shellfish Derived Hives Past Medical History:  
Diagnosis Date  BMI 33.0-33.9,adult 1/29/2018  Cardiomegaly - hypertensive 6/4/2015  Family history of sarcoidosis 6/4/2015  
 HTN (hypertension) 3/5/14 notes  
 noelle urena notes rec'd  Hypercholesteremia 9/24/2014  Hypothyroidism 10/8/2014  Irregular heart beat 9/24/2014  Midline thoracic back pain 6/4/2015  MVP (mitral valve prolapse) 9/24/2014  Prediabetes 9/24/2014  Screen for colon cancer 1/29/2018  Thyroid disease  Vitamin D deficiency 9/24/2014 Past Surgical History:  
Procedure Laterality Date  HX GYN    
 HX HYSTERECTOMY N/A 1995  HX UROLOGICAL History reviewed. No pertinent family history. Social History Tobacco Use  Smoking status: Never Smoker  Smokeless tobacco: Never Used Substance Use Topics  Alcohol use: No  
  
Lab Results Component Value Date/Time WBC 10.8 01/29/2018 05:23 PM  
 HGB 13.1 01/29/2018 05:23 PM  
 HCT 40.2 01/29/2018 05:23 PM  
 PLATELET 551 37/51/9244 05:23 PM  
 MCV 82 01/29/2018 05:23 PM  
 
Lab Results Component Value Date/Time TSH 1.280 01/29/2018 05:23 PM  
   
Review of Systems Constitutional: Negative for chills and fever. HENT: Negative for ear pain and nosebleeds. Eyes: Negative for blurred vision, pain and discharge. Respiratory: Negative for shortness of breath. Cardiovascular: Negative for chest pain and leg swelling. Gastrointestinal: Negative for constipation, diarrhea, nausea and vomiting. Genitourinary: Negative for frequency. Musculoskeletal: Negative for joint pain. Skin: Negative for itching and rash. Neurological: Negative for headaches. Psychiatric/Behavioral: Negative for depression. The patient is not nervous/anxious. Physical Exam  
Constitutional: She is oriented to person, place, and time. She appears well-developed and well-nourished. HENT:  
Head: Normocephalic and atraumatic. Eyes: Conjunctivae and EOM are normal.  
Neck: Normal range of motion. Neck supple. Cardiovascular: Normal rate, regular rhythm and normal heart sounds. No murmur heard. Pulmonary/Chest: Effort normal and breath sounds normal.  
Abdominal: Soft. Bowel sounds are normal. She exhibits no distension. Musculoskeletal: Normal range of motion. She exhibits no edema. Lymphadenopathy:  
  She has no cervical adenopathy. Neurological: She is alert and oriented to person, place, and time. Skin: No erythema. Psychiatric: Her behavior is normal.  
Nursing note and vitals reviewed. ASSESSMENT and PLAN Diagnoses and all orders for this visit: 1. BMI 33.0-33.9,adult 
-     CBC W/O DIFF 
-     LIPID PANEL 
-     TSH 3RD GENERATION 
-     VITAMIN B12 & FOLATE 
-     METABOLIC PANEL, COMPREHENSIVE 
-     AMB POC URINALYSIS DIP STICK AUTO W/O MICRO 
-     CA HANDLG&/OR CONVEY OF SPEC FOR TR OFFICE TO LAB 2. HTN, goal below 130/80 
-     Olmesartan-amLODIPine-HCTZ (TRIBENZOR) 40-10-25 mg tab; TAKE ONE TABLET BY MOUTH DAILY 
-     nebivolol (BYSTOLIC) 5 mg tablet; TAKE ONE TABLET BY MOUTH DAILY FOR HYPERTENSION 
-     levothyroxine (LEVOTHROID) 50 mcg tablet; Take 1 Tab by mouth Daily (before breakfast). -     CBC W/O DIFF 
-     LIPID PANEL 
-     TSH 3RD GENERATION 
-     VITAMIN B12 & FOLATE 
-     METABOLIC PANEL, COMPREHENSIVE 
-     AMB POC URINALYSIS DIP STICK AUTO W/O MICRO 
-     CA HANDLG&/OR CONVEY OF SPEC FOR TR OFFICE TO LAB 3. Hypercholesteremia -     Olmesartan-amLODIPine-HCTZ (TRIBENZOR) 40-10-25 mg tab; TAKE ONE TABLET BY MOUTH DAILY 
-     nebivolol (BYSTOLIC) 5 mg tablet; TAKE ONE TABLET BY MOUTH DAILY FOR HYPERTENSION 
-     CBC W/O DIFF 
-     LIPID PANEL 
-     TSH 3RD GENERATION 
-     VITAMIN B12 & FOLATE 
-     METABOLIC PANEL, COMPREHENSIVE 
-     AMB POC URINALYSIS DIP STICK AUTO W/O MICRO 
-     CA HANDLG&/OR CONVEY OF SPEC FOR TR OFFICE TO LAB 4.  Other specified hypothyroidism 
-     nebivolol (BYSTOLIC) 5 mg tablet; TAKE ONE TABLET BY MOUTH DAILY FOR HYPERTENSION 
-     IN HANDLG&/OR CONVEY OF SPEC FOR TR OFFICE TO LAB 5. Screening mammogram, encounter for -     [facility] BI SCREENING INCL CAD; Future -     IN HANDLG&/OR CONVEY OF SPEC FOR TR OFFICE TO LAB Other orders 
-     varicella-zoster recombinant, PF, (SHINGRIX) 50 mcg/0.5 mL susr injection; 0.5 mL by IntraMUSCular route once for 1 dose. Discussed the patient's BMI with her. The BMI follow up plan is as follows:  
 
dietary management education, guidance, and counseling 
encourage exercise 
monitor weight 
prescribed dietary intake An After Visit Summary was printed and given to the patient.

## 2019-02-15 LAB
ALBUMIN SERPL-MCNC: 4.3 G/DL (ref 3.5–5.5)
ALBUMIN/GLOB SERPL: 1 {RATIO} (ref 1.2–2.2)
ALP SERPL-CCNC: 94 IU/L (ref 39–117)
ALT SERPL-CCNC: 27 IU/L (ref 0–32)
AST SERPL-CCNC: 16 IU/L (ref 0–40)
BILIRUB SERPL-MCNC: 0.2 MG/DL (ref 0–1.2)
BUN SERPL-MCNC: 14 MG/DL (ref 6–24)
BUN/CREAT SERPL: 24 (ref 9–23)
CALCIUM SERPL-MCNC: 10.3 MG/DL (ref 8.7–10.2)
CHLORIDE SERPL-SCNC: 100 MMOL/L (ref 96–106)
CHOLEST SERPL-MCNC: 150 MG/DL (ref 100–199)
CO2 SERPL-SCNC: 23 MMOL/L (ref 20–29)
CREAT SERPL-MCNC: 0.59 MG/DL (ref 0.57–1)
ERYTHROCYTE [DISTWIDTH] IN BLOOD BY AUTOMATED COUNT: 14.4 % (ref 12.3–15.4)
FOLATE SERPL-MCNC: 11.7 NG/ML
GLOBULIN SER CALC-MCNC: 4.2 G/DL (ref 1.5–4.5)
GLUCOSE SERPL-MCNC: 107 MG/DL (ref 65–99)
HCT VFR BLD AUTO: 40.3 % (ref 34–46.6)
HDLC SERPL-MCNC: 42 MG/DL
HGB BLD-MCNC: 14 G/DL (ref 11.1–15.9)
LDLC SERPL CALC-MCNC: 76 MG/DL (ref 0–99)
MCH RBC QN AUTO: 27.4 PG (ref 26.6–33)
MCHC RBC AUTO-ENTMCNC: 34.7 G/DL (ref 31.5–35.7)
MCV RBC AUTO: 79 FL (ref 79–97)
PLATELET # BLD AUTO: 310 X10E3/UL (ref 150–379)
POTASSIUM SERPL-SCNC: 3.7 MMOL/L (ref 3.5–5.2)
PROT SERPL-MCNC: 8.5 G/DL (ref 6–8.5)
RBC # BLD AUTO: 5.11 X10E6/UL (ref 3.77–5.28)
SODIUM SERPL-SCNC: 144 MMOL/L (ref 134–144)
TRIGL SERPL-MCNC: 158 MG/DL (ref 0–149)
TSH SERPL DL<=0.005 MIU/L-ACNC: 0.94 UIU/ML (ref 0.45–4.5)
VIT B12 SERPL-MCNC: 769 PG/ML (ref 232–1245)
VLDLC SERPL CALC-MCNC: 32 MG/DL (ref 5–40)
WBC # BLD AUTO: 10.2 X10E3/UL (ref 3.4–10.8)

## 2019-09-24 PROBLEM — Z12.11 SCREEN FOR COLON CANCER: Status: RESOLVED | Noted: 2018-01-29 | Resolved: 2019-09-24

## 2019-11-14 ENCOUNTER — HOSPITAL ENCOUNTER (EMERGENCY)
Age: 59
Discharge: HOME | End: 2019-11-14
Attending: EMERGENCY MEDICINE
Payer: COMMERCIAL

## 2019-11-14 VITALS
HEART RATE: 82 BPM | DIASTOLIC BLOOD PRESSURE: 97 MMHG | BODY MASS INDEX: 32.47 KG/M2 | OXYGEN SATURATION: 99 % | HEIGHT: 61 IN | WEIGHT: 172 LBS | SYSTOLIC BLOOD PRESSURE: 169 MMHG | TEMPERATURE: 97.9 F | RESPIRATION RATE: 16 BRPM

## 2019-11-14 DIAGNOSIS — B02.9 HERPES ZOSTER WITHOUT COMPLICATION: Primary | ICD-10-CM

## 2019-11-14 DIAGNOSIS — H61.21 IMPACTED CERUMEN OF RIGHT EAR: ICD-10-CM

## 2019-11-14 PROCEDURE — 74011250637 HC RX REV CODE- 250/637: Performed by: EMERGENCY MEDICINE

## 2019-11-14 PROCEDURE — 99283 EMERGENCY DEPT VISIT LOW MDM: CPT

## 2019-11-14 RX ORDER — IBUPROFEN 800 MG/1
800 TABLET ORAL
Qty: 30 TAB | Refills: 0 | Status: SHIPPED | OUTPATIENT
Start: 2019-11-14 | End: 2020-02-11 | Stop reason: ALTCHOICE

## 2019-11-14 RX ORDER — VALACYCLOVIR HYDROCHLORIDE 1 G/1
1000 TABLET, FILM COATED ORAL 3 TIMES DAILY
Qty: 21 TAB | Refills: 0 | Status: SHIPPED | OUTPATIENT
Start: 2019-11-14 | End: 2019-11-21

## 2019-11-14 RX ADMIN — CARBAMIDE PEROXIDE 6.5% 5 DROP: 6.5 LIQUID AURICULAR (OTIC) at 11:05

## 2019-11-14 NOTE — ED PROVIDER NOTES
EMERGENCY DEPARTMENT HISTORY AND PHYSICAL EXAM      Date: 11/14/2019  Patient Name: Juanita Avila    History of Presenting Illness     Chief Complaint   Patient presents with    Skin Problem     pt c/o rashes on her back x 1 wk. History Provided By: Patient    HPI: Juanita Avila, 63 y.o. female with past medical history significant for hypertension, hyperlipidemia, hypothyroidism, and vitamin D deficiency who presents via private vehicle to the ED with cc of left-sided rash for the past 3 days. Patient states her rash is located under the left breast and wraps around under her left arm to the left side of her back. It does not cross her sternum or her midline back. The rash is described as a burning discomfort pain. She denies take anything for the pain prior to arrival.  She did talk to her sister who believes this is shingles that she has had this in the past.  Patient endorses having chickenpox in the past, but no other symptoms. She denies any recent sick contacts or any prodromal symptoms. Patient also endorses some dizziness that she believes is related to wax buildup in her ear. PMHx: Hypertension, hyperlipidemia, hypothyroidism, vitamin D deficiency  Social Hx: Denies alcohol, tobacco, or illegal drug use    PCP: Panda Smith MD    There are no other complaints, changes, or physical findings at this time. No current facility-administered medications on file prior to encounter. Current Outpatient Medications on File Prior to Encounter   Medication Sig Dispense Refill    Olmesartan-amLODIPine-HCTZ (TRIBENZOR) 40-10-25 mg tab TAKE ONE TABLET BY MOUTH DAILY 30 Tab 11    nebivolol (BYSTOLIC) 5 mg tablet TAKE ONE TABLET BY MOUTH DAILY FOR HYPERTENSION 30 Tab 11    levothyroxine (LEVOTHROID) 50 mcg tablet Take 1 Tab by mouth Daily (before breakfast). 30 Tab 11    Aspirin, Buffered 81 mg tab Take 1 Tab by mouth daily.  30 Tab 11     Past History     Past Medical History:  Past Medical History:   Diagnosis Date    BMI 33.0-33.9,adult 1/29/2018    Cardiomegaly - hypertensive 6/4/2015    Family history of sarcoidosis 6/4/2015    HTN (hypertension) 3/5/14 notes    noelle urena notes rec'd    Hypercholesteremia 9/24/2014    Hypothyroidism 10/8/2014    Irregular heart beat 9/24/2014    Midline thoracic back pain 6/4/2015    MVP (mitral valve prolapse) 9/24/2014    Prediabetes 9/24/2014    Screen for colon cancer 1/29/2018    Thyroid disease     Vitamin D deficiency 9/24/2014     Past Surgical History:  Past Surgical History:   Procedure Laterality Date    HX GYN      HX HYSTERECTOMY N/A 1995    HX UROLOGICAL       Family History:  History reviewed. No pertinent family history. Social History:  Social History     Tobacco Use    Smoking status: Never Smoker    Smokeless tobacco: Never Used   Substance Use Topics    Alcohol use: No    Drug use: No     Allergies: Allergies   Allergen Reactions    Oxycontin [Oxycodone] Nausea and Vomiting    Shellfish Derived Hives     Review of Systems   Review of Systems   Constitutional: Negative for chills and fever. HENT: Negative for congestion, rhinorrhea, sneezing and sore throat. Eyes: Negative for redness and visual disturbance. Respiratory: Negative for shortness of breath. Cardiovascular: Negative for leg swelling. Gastrointestinal: Negative for abdominal pain, nausea and vomiting. Genitourinary: Negative for difficulty urinating and frequency. Musculoskeletal: Positive for myalgias. Negative for back pain and neck stiffness. Skin: Positive for rash. Neurological: Negative for dizziness, syncope, weakness and headaches. Hematological: Negative for adenopathy. All other systems reviewed and are negative. Physical Exam   Physical Exam   Constitutional: She is oriented to person, place, and time. She appears well-developed and well-nourished. HENT:   Head: Normocephalic and atraumatic.    Mouth/Throat: Oropharynx is clear and moist.   Cerumen impaction of the right ear   Eyes: Conjunctivae and EOM are normal.   Neck: Normal range of motion and full passive range of motion without pain. Neck supple. Cardiovascular: Normal rate, regular rhythm, S1 normal, S2 normal, normal heart sounds, intact distal pulses and normal pulses. No murmur heard. Pulmonary/Chest: Effort normal and breath sounds normal. No respiratory distress. She has no wheezes. Abdominal: Soft. Normal appearance and bowel sounds are normal. She exhibits no distension. There is no tenderness. There is no rebound. Musculoskeletal: Normal range of motion. Neurological: She is alert and oriented to person, place, and time. She has normal strength. Skin: Skin is warm, dry and intact. Rash noted. Rash is vesicular (Under the left breast and wraps around to the left inferior scapula, does not cross the midline). Psychiatric: She has a normal mood and affect. Her speech is normal and behavior is normal. Judgment and thought content normal.   Nursing note and vitals reviewed. Diagnostic Study Results   Labs -   No results found for this or any previous visit (from the past 12 hour(s)). Radiologic Studies -   No orders to display     No results found. Medical Decision Making   I am the first provider for this patient. I reviewed the vital signs, available nursing notes, past medical history, past surgical history, family history and social history. Vital Signs-Reviewed the patient's vital signs. Patient Vitals for the past 12 hrs:   Temp Pulse Resp BP SpO2   11/14/19 0956 97.9 °F (36.6 °C) 82 16 (!) 169/97 99 %     Pulse Oximetry Analysis - 9% on 9RA    Records Reviewed: Nursing Notes    Provider Notes (Medical Decision Making):   60-year-old female presents with left-sided rash in a dermatomal fashion consistent with shingles. Will treat with valacyclovir and ibuprofen.   Offered patient something stronger for pain but she states she has to work and she does not want to be drowsy. She will follow-up with her primary care doctor. ED Course:   Initial assessment performed. The patients presenting problems have been discussed, and they are in agreement with the care plan formulated and outlined with them. I have encouraged them to ask questions as they arise throughout their visit. Progress Note:   Updated pt on all returned results and findings. Discussed the importance of proper follow up as referred below along with return precautions. Pt in agreement with the care plan and expresses agreement with and understanding of all items discussed. Disposition:  Discharge Note:  The pt is ready for discharge. The pt's signs, symptoms, diagnosis, and discharge instructions have been discussed and pt has conveyed their understanding. The pt is to follow up as recommended or return to ER should their symptoms worsen. Plan has been discussed and pt is in agreement. PLAN:  1. Current Discharge Medication List      START taking these medications    Details   valACYclovir (VALTREX) 1 gram tablet Take 1 Tab by mouth three (3) times daily for 7 days. Qty: 21 Tab, Refills: 0      ibuprofen (MOTRIN) 800 mg tablet Take 1 Tab by mouth every eight (8) hours as needed for Pain. Qty: 30 Tab, Refills: 0           2. Follow-up Information     Follow up With Specialties Details Why Contact Info    Panda Smith MD Family Practice Schedule an appointment as soon as possible for a visit  [address]       [facility] EMERGENCY DEPT Emergency Medicine  As needed, If symptoms worsen Ryan Kennedy  [phone]        Return to ED if worse     Diagnosis     Clinical Impression:   1. Herpes zoster without complication    2. Impacted cerumen of right ear            Please note that this dictation was completed with Dragon, computer voice recognition software.   Quite often unanticipated grammatical, syntax, homophones, and other interpretive errors are inadvertently transcribed by the computer software. Please disregard these errors. Additionally, please excuse any errors that have escaped final proofreading.

## 2019-11-14 NOTE — DISCHARGE INSTRUCTIONS
Patient Education        Shingles: Care Instructions  Your Care Instructions    Shingles (herpes zoster) causes pain and a blistered rash. The rash can appear anywhere on the body but will be on only one side of the body, the left or right. It will be in a band, a strip, or a small area. The pain can be very severe. Shingles can also cause tingling or itching in the area of the rash. The blisters scab over after a few days and heal in 2 to 4 weeks. Medicines can help you feel better and may help prevent more serious problems caused by shingles. Shingles is caused by the same virus that causes chickenpox. When you have chickenpox, the virus gets into your nerve roots and stays there (becomes dormant) long after you get over the chickenpox. If the virus becomes active again, it can cause shingles. Follow-up care is a key part of your treatment and safety. Be sure to make and go to all appointments, and call your doctor if you are having problems. It's also a good idea to know your test results and keep a list of the medicines you take. How can you care for yourself at home? · Be safe with medicines. Take your medicines exactly as prescribed. Call your doctor if you think you are having a problem with your medicine. Antiviral medicine helps you get better faster. · Try not to scratch or pick at the blisters. They will crust over and fall off on their own if you leave them alone. · Put cool, wet cloths on the area to relieve pain and itching. You can also use calamine lotion. Try not to use so much lotion that it cakes and is hard to get off. · Put cornstarch or baking soda on the sores to help dry them out so they heal faster. · Do not use thick ointment, such as petroleum jelly, on the sores. This will keep them from drying and healing. · To help remove loose crusts, soak them in tap water. This can help decrease oozing, and dry and soothe the skin.   · Take an over-the-counter pain medicine, such as acetaminophen (Tylenol), ibuprofen (Advil, Motrin), or naproxen (Aleve). Read and follow all instructions on the label. · Avoid close contact with people until the blisters have healed. It is very important for you to avoid contact with anyone who has never had chickenpox or the chickenpox vaccine. Pregnant women, young babies, and anyone else who has a hard time fighting infection (such as someone with HIV, diabetes, or cancer) is especially at risk. When should you call for help? Call your doctor now or seek immediate medical care if:    · You have a new or higher fever.     · You have a severe headache and a stiff neck.     · You lose the ability to think clearly.     · The rash spreads to your forehead, nose, eyes, or eyelids.     · You have eye pain, or your vision gets worse.     · You have new pain in your face, or you cannot move the muscles in your face.     · Blisters spread to new parts of your body.    Watch closely for changes in your health, and be sure to contact your doctor if:    · The rash has not healed after 2 to 4 weeks.     · You still have pain after the rash has healed. Where can you learn more? Go to http://lora-adiel.info/. Coretta Barrett in the search box to learn more about \"Shingles: Care Instructions. \"  Current as of: June 9, 2019  Content Version: 12.2  © 5347-1193 [employer]. Care instructions adapted under license by [employer] (which disclaims liability or warranty for this information). If you have questions about a medical condition or this instruction, always ask your healthcare professional. [address] any warranty or liability for your use of this information.

## 2020-02-11 ENCOUNTER — OFFICE VISIT (OUTPATIENT)
Dept: FAMILY MEDICINE CLINIC | Age: 60
End: 2020-02-11

## 2020-02-11 VITALS
WEIGHT: 183.9 LBS | HEART RATE: 74 BPM | BODY MASS INDEX: 34.72 KG/M2 | HEIGHT: 61 IN | TEMPERATURE: 96.4 F | SYSTOLIC BLOOD PRESSURE: 145 MMHG | RESPIRATION RATE: 20 BRPM | OXYGEN SATURATION: 95 % | DIASTOLIC BLOOD PRESSURE: 85 MMHG

## 2020-02-11 DIAGNOSIS — E55.9 VITAMIN D DEFICIENCY: ICD-10-CM

## 2020-02-11 DIAGNOSIS — R82.90 ABNORMAL URINALYSIS: ICD-10-CM

## 2020-02-11 DIAGNOSIS — E03.9 HYPOTHYROIDISM, UNSPECIFIED TYPE: ICD-10-CM

## 2020-02-11 DIAGNOSIS — Z23 NEED FOR VIRAL IMMUNIZATION: ICD-10-CM

## 2020-02-11 DIAGNOSIS — Z00.00 WELL WOMAN EXAM (NO GYNECOLOGICAL EXAM): Primary | ICD-10-CM

## 2020-02-11 DIAGNOSIS — R73.03 PREDIABETES: ICD-10-CM

## 2020-02-11 DIAGNOSIS — Z12.11 SCREEN FOR COLON CANCER: ICD-10-CM

## 2020-02-11 DIAGNOSIS — I10 HTN, GOAL BELOW 140/90: ICD-10-CM

## 2020-02-11 DIAGNOSIS — E78.00 HYPERCHOLESTEREMIA: ICD-10-CM

## 2020-02-11 LAB
BILIRUB UR QL STRIP: NEGATIVE
GLUCOSE UR-MCNC: NEGATIVE MG/DL
KETONES P FAST UR STRIP-MCNC: NEGATIVE MG/DL
PH UR STRIP: 6.5 [PH] (ref 4.6–8)
PROT UR QL STRIP: NEGATIVE
SP GR UR STRIP: 1.02 (ref 1–1.03)
UA UROBILINOGEN AMB POC: NORMAL (ref 0.2–1)
URINALYSIS CLARITY POC: NORMAL
URINALYSIS COLOR POC: YELLOW
URINE BLOOD POC: NORMAL
URINE LEUKOCYTES POC: NORMAL
URINE NITRITES POC: NEGATIVE

## 2020-02-11 NOTE — PROGRESS NOTES
Name and  verified      Chief Complaint   Patient presents with    [facility] reviewed-discussed with patient. 1. Have you been to the ER, urgent care clinic since your last visit? Hospitalized since your last visit? Yes, ED visit on 2019 Dx herpes Zoster and Impacted Cerumen of Right Ear.    2. Have you seen or consulted any other health care providers outside of the [address] [place] [address] since your last visit? Include any pap smears or colon screening. No      Blood pressure elevated. Dr Qian Mcgowan notified. Will recheck blood pressure 145/85.

## 2020-02-11 NOTE — PROGRESS NOTES
Subjective:   63 y.o. female for Well Woman Check. Her gyne and breast care is done elsewhere by her Ob-Gyne physician. Present for CPE, last Complete Physical exam was 2017 Up todate w/ all vaccination, last tetanus vaccine was in ,      last mammog was nl and  In 1yrs ago,  last pap smear normal and was in one yr,      last colonoscopy was never,  No past surgical hx,  last bone dexa scan was never   No family hx of breast cancer   Father 61y  from a CHF, mother  from diabetic compl from Etoh abuse,  no family hx of colon cancer, , +++sexaully active and uses Safe sex, +++ physically active,  compliant w/ meds, +++Rf needed for today for her meds. Patient has good supportive care at home, and feels safe no physical mental abuse,    Medications causing fall and the risk for future fall screened specially if the continuation of some of the current meds continues is addressed,  the depression at this age addressed pt with improvig interests and enjoy to do things, not anxious not depressed, not wanted to heart self or others  pt at this visit, is physically functional with gait nl and nicely independent and walks w/out mobility device and, in addition mentaly is functional,  very Alert and oriented ,  Required Immunizations   The immunizations including flu, PNA, dTap r/w'd uptodate ,   BMI for the pt's age the nl BMI r/w'd and information given, bp was screened for abnormality,    HTN  Today pt present for Bp check and++= Compliancy w/ the bp meds, having had the low salt diet ,  has been active, patien does not obtain the bp at home ,, today the pt denies Chest Pain, has no legs swelling no lightheadedness,  Prediabetic state   Pt  currently on no diabetic meds, having the diabetic diet, andhas been active with daily exercise, pt denies any tingling sensation, polyurea and polydipsia,Feeling better since the last visit.         Current Outpatient Medications   Medication Sig Dispense Refill    Olmesartan-amLODIPine-HCTZ (TRIBENZOR) 40-10-25 mg tab TAKE ONE TABLET BY MOUTH DAILY 30 Tab 11    nebivolol (BYSTOLIC) 5 mg tablet TAKE ONE TABLET BY MOUTH DAILY FOR HYPERTENSION 30 Tab 11    levothyroxine (LEVOTHROID) 50 mcg tablet Take 1 Tab by mouth Daily (before breakfast). 30 Tab 11    Aspirin, Buffered 81 mg tab Take 1 Tab by mouth daily. (Patient taking differently: Take  by mouth. Take other 2-3 days by mouth per patient reported) 30 Tab 11     Allergies   Allergen Reactions    Oxycontin [Oxycodone] Nausea and Vomiting    Shellfish Derived Hives     Past Medical History:   Diagnosis Date    BMI 33.0-33.9,adult 1/29/2018    Cardiomegaly - hypertensive 6/4/2015    Family history of sarcoidosis 6/4/2015    HTN (hypertension) 3/5/14 notes    noelle urena notes rec'd    Hypercholesteremia 9/24/2014    Hypothyroidism 10/8/2014    Irregular heart beat 9/24/2014    Midline thoracic back pain 6/4/2015    MVP (mitral valve prolapse) 9/24/2014    Prediabetes 9/24/2014    Screen for colon cancer 1/29/2018    Thyroid disease     Vitamin D deficiency 9/24/2014     Past Surgical History:   Procedure Laterality Date    HX GYN      HX HYSTERECTOMY N/A 1995    HX UROLOGICAL       History reviewed. No pertinent family history. Social History     Tobacco Use    Smoking status: Never Smoker    Smokeless tobacco: Never Used   Substance Use Topics    Alcohol use: No        Lab Results   Component Value Date/Time    WBC 10.2 02/14/2019 01:58 PM    HGB 14.0 02/14/2019 01:58 PM    HCT 40.3 02/14/2019 01:58 PM    PLATELET 327 50/37/2251 01:58 PM    MCV 79 02/14/2019 01:58 PM     Lab Results   Component Value Date/Time    Cholesterol, total 150 02/14/2019 01:58 PM    HDL Cholesterol 42 02/14/2019 01:58 PM    LDL, calculated 76 02/14/2019 01:58 PM    Triglyceride 158 (H) 02/14/2019 01:58 PM     Lab Results   Component Value Date/Time    TSH 0.945 02/14/2019 01:58 PM         ROS: Feeling generally well. No TIA's or unusual headaches, no dysphagia. No prolonged cough. No dyspnea or chest pain on exertion. No abdominal pain, change in bowel habits, black or bloody stools. No urinary tract symptoms. No new or unusual musculoskeletal symptoms. Specific concerns today:  Hypothyrodism  Patient present for follow-up currently on thyroid medication has had some wt loss since last visit patient compliant with prescribed medication and last thyroid-stimulating hormone level was in the normal range patient is aware of the results, as per presentation the patient has no been feeling tired , and had no cold intolerance,       Objective: The patient appears well, alert, oriented x 3, in no distress. Visit Vitals  /85 (BP 1 Location: Left arm)   Pulse 74   Temp 96.4 °F (35.8 °C) (Oral)   Resp 20   Ht 5' 1\" (1.549 m)   Wt 183 lb 14.4 oz (83.4 kg)   SpO2 95%   BMI 34.75 kg/m²     ENT normal.  Neck supple. No adenopathy or thyromegaly. CHRISTINA. Lungs are clear, good air entry, no wheezes, rhonchi or rales. S1 and S2 normal, no murmurs, regular rate and rhythm. Abdomen soft without tenderness, guarding, mass or organomegaly. Extremities show no edema, normal peripheral pulses. Neurological is normal, no focal findings. Breast and Pelvic exams are deferred. Assessment/Plan:   Well Woman  lose weight, increase physical activity, limit alcohol consumption, follow low fat diet, follow low salt diet, continue present plan, routine labs ordered, call if any problems  Diagnoses and all orders for this visit:    1. Well woman exam (no gynecological exam)  Comments:  [V70.0]    2. Hypercholesteremia  -     ZOSTER VACC RECOMBINANT ADJUVANTED  -     CBC W/O DIFF  -     HEMOGLOBIN A1C WITH EAG  -     LIPID PANEL  -     METABOLIC PANEL, COMPREHENSIVE  -     VITAMIN D, 25 HYDROXY  -     TSH 3RD GENERATION  -     AMB POC URINALYSIS DIP STICK AUTO W/O MICRO    3.  Prediabetes  -     ZOSTER VACC RECOMBINANT ADJUVANTED  -     CBC W/O DIFF  -     HEMOGLOBIN A1C WITH EAG  -     LIPID PANEL  -     METABOLIC PANEL, COMPREHENSIVE  -     VITAMIN D, 25 HYDROXY  -     TSH 3RD GENERATION  -     AMB POC URINALYSIS DIP STICK AUTO W/O MICRO    4. Vitamin D deficiency  -     ZOSTER VACC RECOMBINANT ADJUVANTED  -     CBC W/O DIFF  -     HEMOGLOBIN A1C WITH EAG  -     LIPID PANEL  -     METABOLIC PANEL, COMPREHENSIVE  -     VITAMIN D, 25 HYDROXY  -     TSH 3RD GENERATION  -     AMB POC URINALYSIS DIP STICK AUTO W/O MICRO    5. Hypothyroidism, unspecified type  -     ZOSTER VACC RECOMBINANT ADJUVANTED  -     CBC W/O DIFF  -     HEMOGLOBIN A1C WITH EAG  -     LIPID PANEL  -     METABOLIC PANEL, COMPREHENSIVE  -     VITAMIN D, 25 HYDROXY  -     TSH 3RD GENERATION  -     AMB POC URINALYSIS DIP STICK AUTO W/O MICRO    6. HTN, goal below 140/90  -     ZOSTER VACC RECOMBINANT ADJUVANTED  -     CBC W/O DIFF  -     HEMOGLOBIN A1C WITH EAG  -     LIPID PANEL  -     METABOLIC PANEL, COMPREHENSIVE  -     VITAMIN D, 25 HYDROXY  -     TSH 3RD GENERATION  -     AMB POC URINALYSIS DIP STICK AUTO W/O MICRO    7. Screen for colon cancer    8. Need for viral immunization  -     ZOSTER VACC RECOMBINANT ADJUVANTED    9.  Abnormal urinalysis  -     CULTURE, URINE

## 2020-02-11 NOTE — PATIENT INSTRUCTIONS
Learning About Cervical Cancer Screening  What is a cervical cancer screening test?    The cervix is the lower part of the uterus that opens into the vagina. Cervical cancer screening tests check the cells on the cervix for changes that could lead to cancer. Two tests can be used to screen for cervical cancer. They may be used alone or together. A Pap test.   This test looks for changes in the cells of the cervix. Some of these cell changes could lead to cancer. A human papillomavirus (HPV) test.   This test looks for the HPV virus. Some high-risk types of HPV can cause cell changes that could lead to cervical cancer. During either test, the doctor or nurse will insert a tool called a speculum into your vagina. The speculum gently opens the vaginal walls. It allows your doctor to see inside the vagina and the cervix. He or she uses a small swab or brush to collect cell samples from your cervix. Try to schedule the test when you're not having your period. To get ready for the test, your doctor may ask you to use a condom if you have sex before the test. Your doctor may also say to avoid douches, tampons, vaginal medicines, sprays, and powders for at least a day before you have the test.  When should you have a screening test?  Talk with your doctor about cervical cancer screening. If you are a woman with an average risk for cervical cancer, your doctor will likely suggest starting screening at age 24. Women 21 to 34  If you are in this age group, your doctor will likely suggest that you get a Pap test. If your Pap test results are normal, you can wait 3 years to have another test.  Women 27 to 59  Screening options for women in this age group may include:  · A Pap test. If your results are normal, you can wait 3 years to have another test.  · An HPV test. If your results are normal, you can wait 5 years to have another test.  · A Pap test and an HPV test. Having both tests is called co-testing.  If your results are normal, you can wait 5 years to be tested again. Women 72 and older  · If you are age 72 or older, talk with your doctor about what's right for you. Women ages 72 and older may no longer need to be screened for cervical cancer. When to stop having screening tests depends on your medical history, your overall health, and your risk of cervical cell changes or cervical cancer. What happens after the test?  The sample of cells taken during your test will be sent to a lab so that an expert can look at the cells. It usually takes a week or two to get the results back. Pap tests  · A normal result means that the test didn't find any abnormal cells in the sample. · An abnormal result can mean many things. Most of these aren't cancer. The results of your test may be abnormal because:  ? You have an infection of the vagina or cervix, such as a yeast infection. ? You have low estrogen levels after menopause that are causing the cells to change. ? You have cell changes that may be a sign of precancer or cancer. The results are ranked based on how serious the changes might be. HPV tests  · A negative result means that the test didn't find any high-risk HPV in the sample. · A positive HPV test means that at least one type of high-risk HPV was found. It doesn't mean that you have cervical cancer, but it increases your chance of having cell changes that could lead to cancer. Follow-up care is a key part of your treatment and safety. Be sure to make and go to all appointments, and call your doctor if you are having problems. It's also a good idea to know your test results and keep a list of the medicines you take. Where can you learn more? Go to http://lora-adiel.info/. Enter P919 in the search box to learn more about \"Learning About Cervical Cancer Screening. \"  Current as of: December 19, 2018  Content Version: 12.2  © 2975-2871 [employer].  Care instructions adapted under license by [address] (which disclaims liability or warranty for this information). If you have questions about a medical condition or this instruction, always ask your healthcare professional. [address] any warranty or liability for your use of this information.

## 2020-02-12 LAB
25(OH)D3+25(OH)D2 SERPL-MCNC: 15.9 NG/ML (ref 30–100)
ALBUMIN SERPL-MCNC: 4.1 G/DL (ref 3.8–4.9)
ALBUMIN/GLOB SERPL: 1.1 {RATIO} (ref 1.2–2.2)
ALP SERPL-CCNC: 91 IU/L (ref 39–117)
ALT SERPL-CCNC: 21 IU/L (ref 0–32)
AST SERPL-CCNC: 15 IU/L (ref 0–40)
BILIRUB SERPL-MCNC: 0.4 MG/DL (ref 0–1.2)
BUN SERPL-MCNC: 15 MG/DL (ref 6–24)
BUN/CREAT SERPL: 25 (ref 9–23)
CALCIUM SERPL-MCNC: 9.6 MG/DL (ref 8.7–10.2)
CHLORIDE SERPL-SCNC: 100 MMOL/L (ref 96–106)
CHOLEST SERPL-MCNC: 147 MG/DL (ref 100–199)
CO2 SERPL-SCNC: 24 MMOL/L (ref 20–29)
CREAT SERPL-MCNC: 0.59 MG/DL (ref 0.57–1)
ERYTHROCYTE [DISTWIDTH] IN BLOOD BY AUTOMATED COUNT: 13.1 % (ref 11.7–15.4)
EST. AVERAGE GLUCOSE BLD GHB EST-MCNC: 140 MG/DL
GLOBULIN SER CALC-MCNC: 3.7 G/DL (ref 1.5–4.5)
GLUCOSE SERPL-MCNC: 156 MG/DL (ref 65–99)
HBA1C MFR BLD: 6.5 % (ref 4.8–5.6)
HCT VFR BLD AUTO: 41.1 % (ref 34–46.6)
HDLC SERPL-MCNC: 38 MG/DL
HGB BLD-MCNC: 13.7 G/DL (ref 11.1–15.9)
LDLC SERPL CALC-MCNC: 84 MG/DL (ref 0–99)
MCH RBC QN AUTO: 27.5 PG (ref 26.6–33)
MCHC RBC AUTO-ENTMCNC: 33.3 G/DL (ref 31.5–35.7)
MCV RBC AUTO: 82 FL (ref 79–97)
PLATELET # BLD AUTO: 303 X10E3/UL (ref 150–450)
POTASSIUM SERPL-SCNC: 3.5 MMOL/L (ref 3.5–5.2)
PROT SERPL-MCNC: 7.8 G/DL (ref 6–8.5)
RBC # BLD AUTO: 4.99 X10E6/UL (ref 3.77–5.28)
SODIUM SERPL-SCNC: 140 MMOL/L (ref 134–144)
TRIGL SERPL-MCNC: 125 MG/DL (ref 0–149)
TSH SERPL DL<=0.005 MIU/L-ACNC: 0.5 UIU/ML (ref 0.45–4.5)
VLDLC SERPL CALC-MCNC: 25 MG/DL (ref 5–40)
WBC # BLD AUTO: 8.1 X10E3/UL (ref 3.4–10.8)

## 2020-02-13 LAB — BACTERIA UR CULT: NORMAL

## 2020-10-15 DIAGNOSIS — E78.00 HYPERCHOLESTEREMIA: ICD-10-CM

## 2020-10-15 DIAGNOSIS — I10 HTN, GOAL BELOW 130/80: ICD-10-CM

## 2020-10-15 DIAGNOSIS — E03.8 OTHER SPECIFIED HYPOTHYROIDISM: ICD-10-CM

## 2020-10-16 RX ORDER — NEBIVOLOL 5 MG/1
TABLET ORAL
Qty: 90 TAB | Refills: 3 | Status: SHIPPED | OUTPATIENT
Start: 2020-10-16 | End: 2021-09-09

## 2020-10-16 RX ORDER — LEVOTHYROXINE SODIUM 50 UG/1
TABLET ORAL
Qty: 90 TAB | Refills: 3 | Status: SHIPPED | OUTPATIENT
Start: 2020-10-16 | End: 2021-09-09

## 2020-10-16 RX ORDER — OLMESARTAN MEDOXOMIL, AMLODIPINE AND HYDROCHLOROTHIAZIDE TABLET 40/10/25 MG 40; 10; 25 MG/1; MG/1; MG/1
TABLET ORAL
Qty: 90 TAB | Refills: 3 | Status: SHIPPED | OUTPATIENT
Start: 2020-10-16 | End: 2021-09-09

## 2021-01-21 ENCOUNTER — PATIENT MESSAGE (OUTPATIENT)
Dept: FAMILY MEDICINE CLINIC | Age: 61
End: 2021-01-21

## 2021-09-08 DIAGNOSIS — E78.00 HYPERCHOLESTEREMIA: ICD-10-CM

## 2021-09-08 DIAGNOSIS — I10 HTN, GOAL BELOW 130/80: ICD-10-CM

## 2021-09-08 DIAGNOSIS — E03.8 OTHER SPECIFIED HYPOTHYROIDISM: ICD-10-CM

## 2021-09-09 RX ORDER — NEBIVOLOL 5 MG/1
TABLET ORAL
Qty: 90 TABLET | Refills: 3 | Status: SHIPPED | OUTPATIENT
Start: 2021-09-09 | End: 2022-08-15

## 2021-09-09 RX ORDER — OLMESARTAN MEDOXOMIL, AMLODIPINE AND HYDROCHLOROTHIAZIDE TABLET 40/10/25 MG 40; 10; 25 MG/1; MG/1; MG/1
TABLET ORAL
Qty: 90 TABLET | Refills: 3 | Status: SHIPPED | OUTPATIENT
Start: 2021-09-09 | End: 2022-07-19 | Stop reason: DRUGHIGH

## 2021-09-09 RX ORDER — LEVOTHYROXINE SODIUM 50 UG/1
TABLET ORAL
Qty: 90 TABLET | Refills: 3 | Status: SHIPPED | OUTPATIENT
Start: 2021-09-09 | End: 2022-08-09 | Stop reason: DRUGHIGH

## 2022-04-06 ENCOUNTER — OFFICE VISIT (OUTPATIENT)
Dept: FAMILY MEDICINE CLINIC | Age: 62
End: 2022-04-06
Payer: COMMERCIAL

## 2022-04-06 VITALS
HEIGHT: 61 IN | BODY MASS INDEX: 31.15 KG/M2 | TEMPERATURE: 97.9 F | SYSTOLIC BLOOD PRESSURE: 142 MMHG | DIASTOLIC BLOOD PRESSURE: 91 MMHG | RESPIRATION RATE: 18 BRPM | HEART RATE: 78 BPM | WEIGHT: 165 LBS

## 2022-04-06 DIAGNOSIS — Z12.11 SCREEN FOR COLON CANCER: ICD-10-CM

## 2022-04-06 DIAGNOSIS — Z00.00 LABORATORY EXAM ORDERED AS PART OF ROUTINE GENERAL MEDICAL EXAMINATION: ICD-10-CM

## 2022-04-06 DIAGNOSIS — Z00.00 WELL WOMAN EXAM (NO GYNECOLOGICAL EXAM): Primary | ICD-10-CM

## 2022-04-06 DIAGNOSIS — Z12.31 ENCOUNTER FOR SCREENING MAMMOGRAM FOR MALIGNANT NEOPLASM OF BREAST: ICD-10-CM

## 2022-04-06 DIAGNOSIS — Z23 ENCOUNTER FOR IMMUNIZATION: ICD-10-CM

## 2022-04-06 LAB
ALBUMIN SERPL-MCNC: 3.5 G/DL (ref 3.5–5)
ALBUMIN/GLOB SERPL: 0.8 {RATIO} (ref 1.1–2.2)
ALP SERPL-CCNC: 106 U/L (ref 45–117)
ALT SERPL-CCNC: 30 U/L (ref 12–78)
ANION GAP SERPL CALC-SCNC: 6 MMOL/L (ref 5–15)
AST SERPL-CCNC: 14 U/L (ref 15–37)
BILIRUB SERPL-MCNC: 0.4 MG/DL (ref 0.2–1)
BUN SERPL-MCNC: 16 MG/DL (ref 6–20)
BUN/CREAT SERPL: 23 (ref 12–20)
CALCIUM SERPL-MCNC: 9.5 MG/DL (ref 8.5–10.1)
CHLORIDE SERPL-SCNC: 100 MMOL/L (ref 97–108)
CHOLEST SERPL-MCNC: 170 MG/DL
CO2 SERPL-SCNC: 28 MMOL/L (ref 21–32)
CREAT SERPL-MCNC: 0.7 MG/DL (ref 0.55–1.02)
ERYTHROCYTE [DISTWIDTH] IN BLOOD BY AUTOMATED COUNT: 12.5 % (ref 11.5–14.5)
EST. AVERAGE GLUCOSE BLD GHB EST-MCNC: 266 MG/DL
GLOBULIN SER CALC-MCNC: 4.6 G/DL (ref 2–4)
GLUCOSE SERPL-MCNC: 348 MG/DL (ref 65–100)
HBA1C MFR BLD: 10.9 % (ref 4–5.6)
HCT VFR BLD AUTO: 42.5 % (ref 35–47)
HDLC SERPL-MCNC: 44 MG/DL
HDLC SERPL: 3.9 {RATIO} (ref 0–5)
HGB BLD-MCNC: 14.5 G/DL (ref 11.5–16)
LDLC SERPL CALC-MCNC: 90.8 MG/DL (ref 0–100)
MCH RBC QN AUTO: 27.8 PG (ref 26–34)
MCHC RBC AUTO-ENTMCNC: 34.1 G/DL (ref 30–36.5)
MCV RBC AUTO: 81.6 FL (ref 80–99)
NRBC # BLD: 0 K/UL (ref 0–0.01)
NRBC BLD-RTO: 0 PER 100 WBC
PLATELET # BLD AUTO: 257 K/UL (ref 150–400)
PMV BLD AUTO: 11.6 FL (ref 8.9–12.9)
POTASSIUM SERPL-SCNC: 3.8 MMOL/L (ref 3.5–5.1)
PROT SERPL-MCNC: 8.1 G/DL (ref 6.4–8.2)
RBC # BLD AUTO: 5.21 M/UL (ref 3.8–5.2)
SODIUM SERPL-SCNC: 134 MMOL/L (ref 136–145)
TRIGL SERPL-MCNC: 176 MG/DL (ref ?–150)
TSH SERPL DL<=0.05 MIU/L-ACNC: 0.34 UIU/ML (ref 0.36–3.74)
VLDLC SERPL CALC-MCNC: 35.2 MG/DL
WBC # BLD AUTO: 8.1 K/UL (ref 3.6–11)

## 2022-04-06 PROCEDURE — 99396 PREV VISIT EST AGE 40-64: CPT | Performed by: FAMILY MEDICINE

## 2022-04-06 NOTE — PROGRESS NOTES
Subjective:   62 y.o. female for Well Woman Check. Her gyne and breast care is done elsewhere by her Ob-Gyne physician,     Patient able to drive no recent accident, Patient compare self health the same to others with the same age,   patient with normal hearing normal vision able to do all ADL  having had no fall and no incontinence having normal appetite no weight loss since last seen eating fruits and vegetables every day does not do exercises denies any substance abuse, Immunization not uptodate offered but opted      last mammog,   last pap smear normal, last colonoscopy was nl and were few yrs ago,  chest CT scan was never done,  Patient has good supportive care at home, and feels safe no physical mental abuse,    Unfortunately patient has couple other problems and concerns which were not included in annual wellness exam visit,       Current Outpatient Medications   Medication Sig Dispense Refill    Olmesartan-amLODIPine-HCTZ 40-10-25 mg tab TAKE 1 TABLET BY MOUTH  DAILY 90 Tablet 3    levothyroxine (SYNTHROID) 50 mcg tablet TAKE 1 TABLET BY MOUTH  DAILY 90 Tablet 3    nebivoloL (Bystolic) 5 mg tablet TAKE 1 TABLET BY MOUTH  DAILY 90 Tablet 3    Aspirin, Buffered 81 mg tab Take 1 Tab by mouth daily.  (Patient not taking: Reported on 4/6/2022) 30 Tab 11     Allergies   Allergen Reactions    Oxycontin [Oxycodone] Nausea and Vomiting    Shellfish Derived Hives     Past Medical History:   Diagnosis Date    BMI 33.0-33.9,adult 1/29/2018    Cardiomegaly - hypertensive 6/4/2015    Family history of sarcoidosis 6/4/2015    HTN (hypertension) 3/5/14 notes    noelle urena notes rec'd    Hypercholesteremia 9/24/2014    Hypothyroidism 10/8/2014    Irregular heart beat 9/24/2014    Midline thoracic back pain 6/4/2015    MVP (mitral valve prolapse) 9/24/2014    Prediabetes 9/24/2014    Screen for colon cancer 1/29/2018    Thyroid disease     Vitamin D deficiency 9/24/2014     Past Surgical History: Procedure Laterality Date    HX GYN      HX HYSTERECTOMY N/A     HX UROLOGICAL       History reviewed. No pertinent family history. Social History     Tobacco Use    Smoking status: Former Smoker     Packs/day: 0.50     Types: Cigarettes     Quit date: 1997     Years since quittin.0    Smokeless tobacco: Never Used   Substance Use Topics    Alcohol use: No        Lab Results   Component Value Date/Time    WBC 8.1 2020 11:18 AM    HGB 13.7 2020 11:18 AM    HCT 41.1 2020 11:18 AM    PLATELET 749  11:18 AM    MCV 82 2020 11:18 AM     Lab Results   Component Value Date/Time    Hemoglobin A1c 6.5 (H) 2020 11:18 AM    Hemoglobin A1c 5.5 2016 08:24 AM    Hemoglobin A1c 5.3 2014 02:20 PM    Glucose 156 (H) 2020 11:18 AM    LDL, calculated 84 2020 11:18 AM    Creatinine 0.59 2020 11:18 AM         Specific concerns today: none except for elevated a1c. Review of Systems      Constitutional: no chills and fever,  , nad     HENT: no ear pain or nosebleeds. No blurred vision    Respiratory: no shortness of breath, wheezing cough     Cardiovascular: Has no chest pain, ,and racing heart . Gastrointestinal: No constipation, diarrhea, nausea and vomiting. Genitourinary: No frequency. Musculoskeletal: Negative for joint pain. Skin: no itching, no rash. Neurological: Negative for dizziness, no tremors  Psychiatric/Behavioral: Negative for depression   is not nervous/anxious. and not depressed the patient is not nervous/anxious. Objective:   Blood pressure (!) 151/97, pulse 78, temperature 97.9 °F (36.6 °C), temperature source Oral, resp. rate 18, height 5' 1\" (1.549 m), weight 165 lb (74.8 kg).    Physical Examination:    General:  alert cooperative appears stated for the age  [de-identified]; normocephalic and atraumatic PERRLA extraocular motor intact normal tympanic membrane normal external ear bilaterally, mucosal normal no drainage  Neck: supple no adenopathy no JVD no bruit  Lungs: Clear to auscultation bilaterally no rales rhonchi or wheezes  Heart: Normal regular S1-S2 ,  no murmur  Breast exam deferred  Abdomen: Soft nontender normal bowel sounds   Extremities: Normal range of motion no point tenderness normal pulses no edema  Pelvic/: No lesion, no lymphadenopathy  Skin: Normal no lesion no rash    Assessment/Plan:     lose weight, increase physical activity, limit alcohol consumption, follow low fat diet, follow low salt diet, routine labs ordered, call if any problems  Diagnoses and all orders for this visit:    1. Well woman exam (no gynecological exam)  Comments:  [V70.0]    2. Encounter for screening mammogram for malignant neoplasm of breast    3. Screen for colon cancer  -     REFERRAL TO GASTROENTEROLOGY    4. Encounter for immunization    5. Laboratory exam ordered as part of routine general medical examination  -     CBC W/O DIFF; Future  -     LIPID PANEL; Future  -     HEMOGLOBIN A1C WITH EAG; Future  -     METABOLIC PANEL, COMPREHENSIVE; Future  -     TSH 3RD GENERATION; Future      Follow-up and Dispositions    · Return in about 3 months (around 7/6/2022), or if symptoms worsen or fail to improve.

## 2022-04-18 ENCOUNTER — OFFICE VISIT (OUTPATIENT)
Dept: FAMILY MEDICINE CLINIC | Age: 62
End: 2022-04-18
Payer: COMMERCIAL

## 2022-04-18 VITALS
WEIGHT: 166.5 LBS | HEIGHT: 61 IN | HEART RATE: 69 BPM | TEMPERATURE: 97.9 F | SYSTOLIC BLOOD PRESSURE: 156 MMHG | DIASTOLIC BLOOD PRESSURE: 95 MMHG | BODY MASS INDEX: 31.43 KG/M2 | RESPIRATION RATE: 19 BRPM | OXYGEN SATURATION: 99 %

## 2022-04-18 DIAGNOSIS — E11.8 TYPE II DIABETES MELLITUS WITH COMPLICATION (HCC): Primary | ICD-10-CM

## 2022-04-18 DIAGNOSIS — R73.03 PREDIABETES: ICD-10-CM

## 2022-04-18 LAB — HBA1C MFR BLD HPLC: 11.5 %

## 2022-04-18 PROCEDURE — 99213 OFFICE O/P EST LOW 20 MIN: CPT | Performed by: FAMILY MEDICINE

## 2022-04-18 PROCEDURE — 3046F HEMOGLOBIN A1C LEVEL >9.0%: CPT | Performed by: FAMILY MEDICINE

## 2022-04-18 PROCEDURE — 83036 HEMOGLOBIN GLYCOSYLATED A1C: CPT | Performed by: FAMILY MEDICINE

## 2022-04-18 NOTE — PROGRESS NOTES
HISTORY OF PRESENT ILLNESS  Clarence Boateng is a 62 y.o. female, A Covid vaccinated x3, and nicely masked Denies flu like sxs, with current medical history of HTN, hypercholesteremia, hypothyroidism, present with the following concern for f/u  regarding the diabetic state, patient has not been compliant with diabetic meds, and is not trying to have a diabetic diet,   patient currently denies tingling sensation, has no polyurea and polydipsia,   last a1c was prediabetc , ddiet controlled diabetic state, stating that her diet has been verybad , does not aloow to get medicated wants to do it on her own, like how she was in 2020, , Last urine microalbumin 2021 and was abnormal, patient currently on ARB. Component Ref Range & Units 4/6/22 1010 2/11/20 1118 2/9/16 0824 9/24/14 1420   Hemoglobin A1c 4.0 - 5.6 % 10.9 High   6.5 High  R, CM  5.5 R, CM  5.3 R     Opted on rx       Current Outpatient Medications   Medication Sig Dispense Refill    Olmesartan-amLODIPine-HCTZ 40-10-25 mg tab TAKE 1 TABLET BY MOUTH  DAILY 90 Tablet 3    levothyroxine (SYNTHROID) 50 mcg tablet TAKE 1 TABLET BY MOUTH  DAILY 90 Tablet 3    nebivoloL (Bystolic) 5 mg tablet TAKE 1 TABLET BY MOUTH  DAILY 90 Tablet 3    Aspirin, Buffered 81 mg tab Take 1 Tab by mouth daily.  30 Tab 11     Allergies   Allergen Reactions    Oxycontin [Oxycodone] Nausea and Vomiting    Shellfish Derived Hives     Past Medical History:   Diagnosis Date    BMI 33.0-33.9,adult 1/29/2018    Cardiomegaly - hypertensive 6/4/2015    Family history of sarcoidosis 6/4/2015    HTN (hypertension) 3/5/14 notes    noelle urena notes rec'd    Hypercholesteremia 9/24/2014    Hypothyroidism 10/8/2014    Irregular heart beat 9/24/2014    Midline thoracic back pain 6/4/2015    MVP (mitral valve prolapse) 9/24/2014    Prediabetes 9/24/2014    Screen for colon cancer 1/29/2018    Thyroid disease     Vitamin D deficiency 9/24/2014     Past Surgical History:   Procedure Laterality Date    HX GYN      HX HYSTERECTOMY N/A     HX UROLOGICAL       History reviewed. No pertinent family history. Social History     Tobacco Use    Smoking status: Former Smoker     Packs/day: 0.50     Types: Cigarettes     Quit date: 1997     Years since quittin.0    Smokeless tobacco: Never Used   Substance Use Topics    Alcohol use: No      Lab Results   Component Value Date/Time    Hemoglobin A1c 10.9 (H) 2022 10:10 AM    Hemoglobin A1c 6.5 (H) 2020 11:18 AM    Hemoglobin A1c 5.5 2016 08:24 AM    Glucose 348 (H) 2022 10:10 AM    LDL, calculated 90.8 2022 10:10 AM    Creatinine 0.70 2022 10:10 AM      Lab Results   Component Value Date/Time    Cholesterol, total 170 2022 10:10 AM    HDL Cholesterol 44 2022 10:10 AM    LDL, calculated 90.8 2022 10:10 AM    Triglyceride 176 (H) 2022 10:10 AM    CHOL/HDL Ratio 3.9 2022 10:10 AM     Lab Results   Component Value Date/Time    ALT (SGPT) 30 2022 10:10 AM    Alk. phosphatase 106 2022 10:10 AM    Bilirubin, total 0.4 2022 10:10 AM    Albumin 3.5 2022 10:10 AM    Protein, total 8.1 2022 10:10 AM    PLATELET 570  10:10 AM        Review of Systems   Constitutional: Negative for chills and fever. HENT: Negative for congestion and nosebleeds. Eyes: Negative for blurred vision and pain. Respiratory: Negative for cough, shortness of breath and wheezing. Cardiovascular: Negative for chest pain and leg swelling. Gastrointestinal: Negative for constipation, diarrhea, nausea and vomiting. Genitourinary: Negative for dysuria and frequency. Musculoskeletal: Negative for joint pain and myalgias. Skin: Negative for itching and rash. Neurological: Negative for dizziness, loss of consciousness and headaches. Psychiatric/Behavioral: Negative for depression. The patient is not nervous/anxious and does not have insomnia.         Physical Exam  Vitals and nursing note reviewed. Constitutional:       Appearance: She is well-developed. HENT:      Head: Normocephalic and atraumatic. Eyes:      Conjunctiva/sclera: Conjunctivae normal.      Pupils: Pupils are equal, round, and reactive to light. Neck:      Thyroid: No thyromegaly. Vascular: No JVD. Cardiovascular:      Rate and Rhythm: Normal rate and regular rhythm. Heart sounds: Normal heart sounds. No murmur heard. No friction rub. No gallop. Pulmonary:      Effort: Pulmonary effort is normal. No respiratory distress. Breath sounds: Normal breath sounds. No stridor. No wheezing or rales. Abdominal:      General: Bowel sounds are normal. There is no distension. Palpations: Abdomen is soft. There is no mass. Tenderness: There is no abdominal tenderness. Musculoskeletal:         General: No tenderness. Normal range of motion. Lymphadenopathy:      Cervical: No cervical adenopathy. Skin:     Findings: No erythema or rash. Neurological:      Mental Status: She is alert and oriented to person, place, and time. Cranial Nerves: No cranial nerve deficit. Deep Tendon Reflexes: Reflexes are normal and symmetric. Psychiatric:         Behavior: Behavior normal.         ASSESSMENT and PLAN  Diagnoses and all orders for this visit:    1. Type II diabetes mellitus with complication (HCC)  -     AMB POC HEMOGLOBIN A1C  -     AMB POC HEMOGLOBIN A1C  -     REFERRAL TO DIABETIC EDUCATION    2.  Prediabetes  -     AMB POC HEMOGLOBIN A1C  -     REFERRAL TO DIABETIC EDUCATION          At this time patient was told to lose weight, so that the current body mass index would get into a close to 25 or between 20-25, patient was told that the patient can achieve this by starting an active life style modifications, working on the diet, increase physical activity, limit alcohol consumption, stop secondhand tobacco exposure,    for which includes creating a an interesting delightful to do list,  such as start of a light physical activity with a brisk daily walking 30 minutes most days of the week, most likely to total of 150 minutes per week, then the patient was told to try to avoid fatty fast foods, have a low-fat low-cholesterol diet, include seafood such as adding fatty fish such as [place], [place]rel, [place] to the diet, increase vegetables and fruits, nuts 3-4 times per week and finally have a low-salt and K rich food intake for a good 4-6 months possibly for ever for the best outcome, patient was told that either a DASH diet or Mediterranean diet but satisfies the need     Routine labs ordered, and the needed abnormal labs will be discussed soon and they can be repeated in 3  months. In addition relevant handouts were given to the patient for a better understanding,    patient was told to call if any problems. Patient acknowledged understanding and  agreed with today's recommendations.

## 2022-04-18 NOTE — PROGRESS NOTES
Chief Complaint   Patient presents with    Diabetes     Follow up    Want her A1C checked again     1. \"Have you been to the ER, urgent care clinic since your last visit? Hospitalized since your last visit? \" No    2. \"Have you seen or consulted any other health care providers outside of the [address] [place] [address] since your last visit? \" No     3. For patients aged 39-70: Has the patient had a colonoscopy / FIT/ Cologuard? Yes - no Care Gap present      If the patient is female:    4. For patients aged 41-77: Has the patient had a mammogram within the past 2 years? Yes - no Care Gap present      5. For patients aged 21-65: Has the patient had a pap smear?  Yes - no Care Gap present    Health Maintenance Due   Topic Date Due    Foot Exam Q1  Never done    MICROALBUMIN Q1  Never done    Eye Exam Retinal or Dilated  Never done    Shingrix Vaccine Age 50> (1 of 2) Never done    Breast Cancer Screen Mammogram  11/24/2017    Colorectal Cancer Screening Combo  02/21/2019    COVID-19 Vaccine (3 - Booster for [place] series) 12/18/2021

## 2022-05-27 ENCOUNTER — OFFICE VISIT (OUTPATIENT)
Dept: FAMILY MEDICINE CLINIC | Age: 62
End: 2022-05-27
Payer: COMMERCIAL

## 2022-05-27 VITALS
HEART RATE: 73 BPM | DIASTOLIC BLOOD PRESSURE: 95 MMHG | OXYGEN SATURATION: 96 % | RESPIRATION RATE: 16 BRPM | BODY MASS INDEX: 31.53 KG/M2 | WEIGHT: 167 LBS | HEIGHT: 61 IN | SYSTOLIC BLOOD PRESSURE: 155 MMHG | TEMPERATURE: 98.1 F

## 2022-05-27 DIAGNOSIS — E11.8 TYPE II DIABETES MELLITUS WITH COMPLICATION (HCC): Primary | ICD-10-CM

## 2022-05-27 DIAGNOSIS — I10 HYPERTENSION, UNSPECIFIED TYPE: ICD-10-CM

## 2022-05-27 DIAGNOSIS — R73.03 PREDIABETES: ICD-10-CM

## 2022-05-27 LAB — HBA1C MFR BLD HPLC: 8.6 %

## 2022-05-27 PROCEDURE — 83036 HEMOGLOBIN GLYCOSYLATED A1C: CPT | Performed by: FAMILY MEDICINE

## 2022-05-27 PROCEDURE — 3052F HG A1C>EQUAL 8.0%<EQUAL 9.0%: CPT | Performed by: FAMILY MEDICINE

## 2022-05-27 PROCEDURE — 99213 OFFICE O/P EST LOW 20 MIN: CPT | Performed by: FAMILY MEDICINE

## 2022-05-27 NOTE — PATIENT INSTRUCTIONS

## 2022-05-27 NOTE — PROGRESS NOTES
HISTORY OF PRESENT ILLNESS  Heather Forman is a 62 y.o. female, present for diabetic check, last a1c,11.5, Does not want to take any diabetic meds wanted to be more active and get it done with the diet,     HTN   pt also present for Bp check , compliant w/ the bp meds, a low salt diet, an  active life style, patient does obtain the bp at home ,   today the pt denies Chest Pain, has no legs swelling no lightheadedness,    the pat has not been feeling anxious, and  Has not been feeling stressed out,   otherwise feeling better since the last visit      Current Outpatient Medications   Medication Sig Dispense Refill    Olmesartan-amLODIPine-HCTZ 40-10-25 mg tab TAKE 1 TABLET BY MOUTH  DAILY 90 Tablet 3    levothyroxine (SYNTHROID) 50 mcg tablet TAKE 1 TABLET BY MOUTH  DAILY 90 Tablet 3    nebivoloL (Bystolic) 5 mg tablet TAKE 1 TABLET BY MOUTH  DAILY 90 Tablet 3    Aspirin, Buffered 81 mg tab Take 1 Tab by mouth daily. 30 Tab 11     Allergies   Allergen Reactions    Oxycontin [Oxycodone] Nausea and Vomiting    Shellfish Derived Hives     Past Medical History:   Diagnosis Date    BMI 33.0-33.9,adult 2018    Cardiomegaly - hypertensive 2015    Family history of sarcoidosis 2015    HTN (hypertension) 3/5/14 notes    noelle urena notes rec'd    Hypercholesteremia 2014    Hypothyroidism 10/8/2014    Irregular heart beat 2014    Midline thoracic back pain 2015    MVP (mitral valve prolapse) 2014    Prediabetes 2014    Screen for colon cancer 2018    Thyroid disease     Vitamin D deficiency 2014     Past Surgical History:   Procedure Laterality Date    HX GYN      HX HYSTERECTOMY N/A     HX UROLOGICAL       History reviewed. No pertinent family history.   Social History     Tobacco Use    Smoking status: Former Smoker     Packs/day: 0.50     Types: Cigarettes     Quit date: 1997     Years since quittin.1    Smokeless tobacco: Never Used   Substance Use Topics    Alcohol use: No      Lab Results   Component Value Date/Time    Hemoglobin A1c 10.9 (H) 04/06/2022 10:10 AM    Hemoglobin A1c 6.5 (H) 02/11/2020 11:18 AM    Hemoglobin A1c 5.5 02/09/2016 08:24 AM    Glucose 348 (H) 04/06/2022 10:10 AM    LDL, calculated 90.8 04/06/2022 10:10 AM    Creatinine 0.70 04/06/2022 10:10 AM      Lab Results   Component Value Date/Time    Cholesterol, total 170 04/06/2022 10:10 AM    HDL Cholesterol 44 04/06/2022 10:10 AM    LDL, calculated 90.8 04/06/2022 10:10 AM    Triglyceride 176 (H) 04/06/2022 10:10 AM    CHOL/HDL Ratio 3.9 04/06/2022 10:10 AM        Review of Systems   Constitutional: Negative for chills and fever. HENT: Negative for congestion and nosebleeds. Eyes: Negative for blurred vision and pain. Respiratory: Negative for cough, shortness of breath and wheezing. Cardiovascular: Negative for chest pain and leg swelling. Gastrointestinal: Negative for constipation, diarrhea, nausea and vomiting. Genitourinary: Negative for dysuria and frequency. Musculoskeletal: Negative for joint pain and myalgias. Skin: Negative for itching and rash. Neurological: Negative for dizziness, loss of consciousness and headaches. Psychiatric/Behavioral: Negative for depression. The patient is not nervous/anxious and does not have insomnia. Physical Exam  Vitals and nursing note reviewed. Constitutional:       Appearance: She is well-developed. HENT:      Head: Normocephalic and atraumatic. Eyes:      Conjunctiva/sclera: Conjunctivae normal.      Pupils: Pupils are equal, round, and reactive to light. Neck:      Thyroid: No thyromegaly. Vascular: No JVD. Cardiovascular:      Rate and Rhythm: Normal rate and regular rhythm. Heart sounds: Normal heart sounds. No murmur heard. No friction rub. No gallop. Pulmonary:      Effort: Pulmonary effort is normal. No respiratory distress. Breath sounds: Normal breath sounds. No stridor.  No wheezing or rales. Abdominal:      General: Bowel sounds are normal. There is no distension. Palpations: Abdomen is soft. There is no mass. Tenderness: There is no abdominal tenderness. Musculoskeletal:         General: No tenderness. Normal range of motion. Lymphadenopathy:      Cervical: No cervical adenopathy. Skin:     Findings: No erythema or rash. Neurological:      Mental Status: She is alert and oriented to person, place, and time. Cranial Nerves: No cranial nerve deficit. Deep Tendon Reflexes: Reflexes are normal and symmetric. Psychiatric:         Behavior: Behavior normal.         ASSESSMENT and PLAN  Diagnoses and all orders for this visit:    1. Type II diabetes mellitus with complication (HCC)  -     ALDOSTERONE/RENIN RATIO; Future  -     TSH 3RD GENERATION; Future    2. Prediabetes  -     AMB POC HEMOGLOBIN A1C    3. Hypertension, unspecified type  -     ALDOSTERONE/RENIN RATIO; Future  -     TSH 3RD GENERATION; Future    cont with life styl mod a1c improved in 6 weks repeat in 2months  Follow-up and Dispositions    · Return in about 2 months (around 7/27/2022), or if symptoms worsen or fail to improve.

## 2022-05-27 NOTE — PROGRESS NOTES
Chief Complaint   Patient presents with    Follow-up     Diabetic follow up    need Colonoscopy and Pap Smear done     1. \"Have you been to the ER, urgent care clinic since your last visit? Hospitalized since your last visit? \" No    2. \"Have you seen or consulted any other health care providers outside of the [address] [place] [address] since your last visit? \" No     3. For patients aged 39-70: Has the patient had a colonoscopy / FIT/ Cologuard? No      If the patient is female:    4. For patients aged 41-77: Has the patient had a mammogram within the past 2 years? Yes - no Care Gap present      5. For patients aged 21-65: Has the patient had a pap smear?  No    Health Maintenance Due   Topic Date Due    Pneumococcal 0-64 years (1 - PCV) Never done    Foot Exam Q1  Never done    MICROALBUMIN Q1  Never done    Eye Exam Retinal or Dilated  Never done    Shingrix Vaccine Age 50> (1 of 2) Never done    Colorectal Cancer Screening Combo  02/21/2019    COVID-19 Vaccine (3 - Booster for [employer] series) 12/18/2021

## 2022-05-28 LAB — TSH SERPL DL<=0.05 MIU/L-ACNC: 0.64 UIU/ML (ref 0.36–3.74)

## 2022-06-10 LAB
ALDOST SERPL-MCNC: 11.3 NG/DL (ref 0–30)
ALDOST/RENIN PLAS-RTO: 41.7 {RATIO} (ref 0–30)
RENIN PLAS-CCNC: 0.27 NG/ML/HR (ref 0.17–5.38)

## 2022-07-19 ENCOUNTER — OFFICE VISIT (OUTPATIENT)
Dept: FAMILY MEDICINE CLINIC | Age: 62
End: 2022-07-19
Payer: COMMERCIAL

## 2022-07-19 DIAGNOSIS — I15.9 SECONDARY HYPERTENSION, UNSPECIFIED: Primary | ICD-10-CM

## 2022-07-19 DIAGNOSIS — E11.8 TYPE II DIABETES MELLITUS WITH COMPLICATION (HCC): ICD-10-CM

## 2022-07-19 DIAGNOSIS — E03.9 HYPOTHYROIDISM, UNSPECIFIED TYPE: ICD-10-CM

## 2022-07-19 DIAGNOSIS — Z71.89 ADVICE GIVEN ABOUT COVID-19 VIRUS INFECTION: ICD-10-CM

## 2022-07-19 DIAGNOSIS — E26.02: ICD-10-CM

## 2022-07-19 LAB — HBA1C MFR BLD HPLC: 7.3 %

## 2022-07-19 PROCEDURE — 3051F HG A1C>EQUAL 7.0%<8.0%: CPT | Performed by: FAMILY MEDICINE

## 2022-07-19 PROCEDURE — 83036 HEMOGLOBIN GLYCOSYLATED A1C: CPT | Performed by: FAMILY MEDICINE

## 2022-07-19 PROCEDURE — 99214 OFFICE O/P EST MOD 30 MIN: CPT | Performed by: FAMILY MEDICINE

## 2022-07-19 RX ORDER — SPIRONOLACTONE AND HYDROCHLOROTHIAZIDE 25; 25 MG/1; MG/1
1 TABLET ORAL DAILY
Qty: 30 TABLET | Refills: 2 | Status: SHIPPED | OUTPATIENT
Start: 2022-07-19 | End: 2022-08-09 | Stop reason: DRUGHIGH

## 2022-07-19 NOTE — PROGRESS NOTES
No chief complaint on file. 1. Have you been to the ER, urgent care clinic since your last visit? Hospitalized since your last visit? No    2. Have you seen or consulted any other health care providers outside of the [address] [place] [address] since your last visit? Include any pap smears or colon screening. No     Health Maintenance   Topic Date Due    Pneumococcal 0-64 years (1 - PCV) Never done    Foot Exam Q1  Never done    MICROALBUMIN Q1  Never done    Eye Exam Retinal or Dilated  Never done    Shingrix Vaccine Age 50> (1 of 2) Never done    Colorectal Cancer Screening Combo  02/21/2019    COVID-19 Vaccine (3 - Booster for Pfizer series) 12/18/2021    Flu Vaccine (1) 09/01/2022    Lipid Screen  04/06/2023    Breast Cancer Screen Mammogram  05/14/2023    Depression Screen  05/27/2023    A1C test (Diabetic or Prediabetic)  07/19/2023    DTaP/Tdap/Td series (2 - Td or Tdap) 06/04/2025    Hepatitis C Screening  Completed     Verified patient with two type of identifiers.   denies c/o at present

## 2022-07-19 NOTE — PROGRESS NOTES
HISTORY OF PRESENT ILLNESS  Kelly Pandya is a 62 y.o. female,    present for Bp check , compliant w/ the bp meds, a low salt diet, an  active life style,   patient does obtain the bp at home averaging >140/90, currently on 4 bp meds and the pressure not controlled, today the pt denies Chest Pain, has no legs swelling no lightheadedness, pt is calm and not anxious person. She also present for f/u regarding the diabetic state, patient has not been compliant with diabeticmeds, and is not trying to have a diabetic diet, no Rf needed for today, patient currently denies tingling sensation, has no polyurea and polydipsia, last a1c was not at target of 8.6% improved to 7.3%, urine microalbumin not obtained,          Current Outpatient Medications   Medication Sig Dispense Refill    spironolactone-hydrochlorothiazide (ALDACTAZIDE) 25-25 mg per tablet Take 1 Tablet by mouth daily. 30 Tablet 2    levothyroxine (SYNTHROID) 50 mcg tablet TAKE 1 TABLET BY MOUTH  DAILY 90 Tablet 3    nebivoloL (Bystolic) 5 mg tablet TAKE 1 TABLET BY MOUTH  DAILY 90 Tablet 3    Aspirin, Buffered 81 mg tab Take 1 Tab by mouth daily. 30 Tab 11     Allergies   Allergen Reactions    Oxycontin [Oxycodone] Nausea and Vomiting    Shellfish Derived Hives     Past Medical History:   Diagnosis Date    BMI 33.0-33.9,adult 1/29/2018    Cardiomegaly - hypertensive 6/4/2015    Family history of sarcoidosis 6/4/2015    HTN (hypertension) 3/5/14 notes    noelle urena notes rec'd    Hypercholesteremia 9/24/2014    Hypothyroidism 10/8/2014    Irregular heart beat 9/24/2014    Midline thoracic back pain 6/4/2015    MVP (mitral valve prolapse) 9/24/2014    Prediabetes 9/24/2014    Screen for colon cancer 1/29/2018    Thyroid disease     Vitamin D deficiency 9/24/2014     Past Surgical History:   Procedure Laterality Date    HX GYN      HX HYSTERECTOMY N/A 1995    HX UROLOGICAL       No family history on file.   Social History     Tobacco Use    Smoking status: Former Smoker     Packs/day: 0.50     Types: Cigarettes     Quit date: 1997     Years since quittin.3    Smokeless tobacco: Never Used   Substance Use Topics    Alcohol use: No      Lab Results   Component Value Date/Time    Hemoglobin A1c 10.9 (H) 2022 10:10 AM    Hemoglobin A1c 6.5 (H) 2020 11:18 AM    Hemoglobin A1c 5.5 2016 08:24 AM    Glucose 348 (H) 2022 10:10 AM    LDL, calculated 90.8 2022 10:10 AM    Creatinine 0.70 2022 10:10 AM      Lab Results   Component Value Date/Time    Cholesterol, total 170 2022 10:10 AM    HDL Cholesterol 44 2022 10:10 AM    LDL, calculated 90.8 2022 10:10 AM    Triglyceride 176 (H) 2022 10:10 AM    CHOL/HDL Ratio 3.9 2022 10:10 AM        Review of Systems   Constitutional: Negative for chills and fever. HENT: Negative for congestion and nosebleeds. Eyes: Negative for blurred vision and pain. Respiratory: Negative for cough, shortness of breath and wheezing. Cardiovascular: Negative for chest pain and leg swelling. Gastrointestinal: Negative for constipation, diarrhea, nausea and vomiting. Genitourinary: Negative for dysuria and frequency. Musculoskeletal: Negative for joint pain and myalgias. Skin: Negative for itching and rash. Neurological: Negative for dizziness, loss of consciousness and headaches. Psychiatric/Behavioral: Negative for depression. The patient is not nervous/anxious and does not have insomnia. Physical Exam  Vitals and nursing note reviewed. Constitutional:       Appearance: She is well-developed. HENT:      Head: Normocephalic and atraumatic. Eyes:      Conjunctiva/sclera: Conjunctivae normal.      Pupils: Pupils are equal, round, and reactive to light. Neck:      Thyroid: No thyromegaly. Vascular: No JVD. Cardiovascular:      Rate and Rhythm: Normal rate and regular rhythm. Heart sounds: Normal heart sounds.  No murmur heard.  No friction rub. No gallop. Pulmonary:      Effort: Pulmonary effort is normal. No respiratory distress. Breath sounds: Normal breath sounds. No stridor. No wheezing or rales. Abdominal:      General: Bowel sounds are normal. There is no distension. Palpations: Abdomen is soft. There is no mass. Tenderness: There is no abdominal tenderness. Musculoskeletal:         General: No tenderness. Normal range of motion. Lymphadenopathy:      Cervical: No cervical adenopathy. Skin:     Findings: No erythema or rash. Neurological:      Mental Status: She is alert and oriented to person, place, and time. Cranial Nerves: No cranial nerve deficit. Deep Tendon Reflexes: Reflexes are normal and symmetric. Psychiatric:         Behavior: Behavior normal.         ASSESSMENT and PLAN  Diagnoses and all orders for this visit:    1. Secondary hypertension, unspecified  -     spironolactone-hydrochlorothiazide (ALDACTAZIDE) 25-25 mg per tablet; Take 1 Tablet by mouth daily. -     CT ABD WO CONT; Future    2. Familial hyperaldosteronism (HCC)  -     spironolactone-hydrochlorothiazide (ALDACTAZIDE) 25-25 mg per tablet; Take 1 Tablet by mouth daily. -     CT ABD WO CONT; Future    3. Advice given about COVID-19 virus infection    4. Type II diabetes mellitus with complication (HCC)  -     AMB POC HEMOGLOBIN A1C    5.  Hypothyroidism, unspecified type  -     AMB POC HEMOGLOBIN A1C    cont with the diabetic diet  rtc in 2 wks for urine micro and bp check meds adjustments    HTN not controlled at this time, will change patient's Bp meds,  Discussed sodium restriction,  k rich diet,  maintaining ideal body weight and regular exercise program such as daily walking 30 min per day most days of the week,  medication compliance advised, was told to call back for rfs or of any concern, regardless patient blood pressure need to be checked in 7 to 14 days for better outcome patient was told to do home monitoring if blood pressure reading greater than 140/90 or less than 90/60 patient was told to call for further advice patient acknowledged understanding and agreed

## 2022-07-19 NOTE — PROGRESS NOTES
Chief Complaint   Patient presents with    Diabetes     1. Have you been to the ER, urgent care clinic since your last visit? Hospitalized since your last visit? No    2. Have you seen or consulted any other health care providers outside of the [address] [place] [address] since your last visit? Include any pap smears or colon screening.  No     Health Maintenance   Topic Date Due    Pneumococcal 0-64 years (1 - PCV) Never done    Foot Exam Q1  Never done    MICROALBUMIN Q1  Never done    Eye Exam Retinal or Dilated  Never done    Shingrix Vaccine Age 50> (1 of 2) Never done    Colorectal Cancer Screening Combo  02/21/2019    COVID-19 Vaccine (3 - Booster for Pfizer series) 12/18/2021    Flu Vaccine (1) 09/01/2022    Lipid Screen  04/06/2023    Breast Cancer Screen Mammogram  05/14/2023    Depression Screen  05/27/2023    A1C test (Diabetic or Prediabetic)  07/19/2023    DTaP/Tdap/Td series (2 - Td or Tdap) 06/04/2025    Hepatitis C Screening  Completed     Verified patient with two type of identifiers, denies c/o at present

## 2022-08-02 ENCOUNTER — HOSPITAL ENCOUNTER (OUTPATIENT)
Dept: CT IMAGING | Age: 62
Discharge: HOME | End: 2022-08-02
Attending: FAMILY MEDICINE
Payer: COMMERCIAL

## 2022-08-02 DIAGNOSIS — E26.02: ICD-10-CM

## 2022-08-02 DIAGNOSIS — I15.9 SECONDARY HYPERTENSION, UNSPECIFIED: ICD-10-CM

## 2022-08-02 PROCEDURE — 74150 CT ABDOMEN W/O CONTRAST: CPT

## 2022-08-09 ENCOUNTER — OFFICE VISIT (OUTPATIENT)
Dept: FAMILY MEDICINE CLINIC | Age: 62
End: 2022-08-09
Payer: COMMERCIAL

## 2022-08-09 VITALS
DIASTOLIC BLOOD PRESSURE: 90 MMHG | TEMPERATURE: 98.3 F | OXYGEN SATURATION: 96 % | SYSTOLIC BLOOD PRESSURE: 136 MMHG | HEART RATE: 78 BPM | BODY MASS INDEX: 31.74 KG/M2 | WEIGHT: 168 LBS | RESPIRATION RATE: 16 BRPM

## 2022-08-09 DIAGNOSIS — E03.9 HYPOTHYROIDISM, UNSPECIFIED TYPE: Primary | ICD-10-CM

## 2022-08-09 DIAGNOSIS — I15.9 SECONDARY HYPERTENSION, UNSPECIFIED: ICD-10-CM

## 2022-08-09 DIAGNOSIS — E26.02: ICD-10-CM

## 2022-08-09 PROCEDURE — 99213 OFFICE O/P EST LOW 20 MIN: CPT | Performed by: FAMILY MEDICINE

## 2022-08-09 RX ORDER — LEVOTHYROXINE SODIUM 25 UG/1
25 TABLET ORAL
Qty: 30 TABLET | Refills: 4 | Status: SHIPPED | OUTPATIENT
Start: 2022-08-09

## 2022-08-09 RX ORDER — LEVOTHYROXINE SODIUM 13 UG/1
CAPSULE ORAL
Qty: 30 CAPSULE | Refills: 5 | Status: SHIPPED | OUTPATIENT
Start: 2022-08-09

## 2022-08-09 RX ORDER — SPIRONOLACTONE 25 MG/1
25 TABLET ORAL DAILY
Qty: 60 TABLET | Refills: 2 | Status: SHIPPED | OUTPATIENT
Start: 2022-08-09 | End: 2022-08-19 | Stop reason: SDUPTHER

## 2022-08-09 NOTE — PROGRESS NOTES
Chief Complaint   Patient presents with    Results    Hypertension       1. Have you been to the ER, urgent care clinic since your last visit? Hospitalized since your last visit? No    2. Have you seen or consulted any other health care providers outside of the [address] [place] [address] since your last visit? Include any pap smears or colon screening. No    verified patient with two type of identifiers.  requesting to discuss results

## 2022-08-09 NOTE — PROGRESS NOTES
HISTORY OF PRESENT ILLNESS  Stephane Colon is a 62 y.o. female,  present w/ current mhx of Hypothyroidism, and Patient present for follow-up currently on thyroid medication has had some wt loss since last visit patient compliant with prescribed medication and last thyroid-stimulating hormone level was in the abnormal range patient is aware of the results, as per presentation the patient has no been feeling tired , and had no cold intolerance,     TSH 0.36 - 3.74 uIU/mL 0.64        patient also present for f/u regarding the diabetic state, patient has been compliant with diabeticmeds, and is not trying to have a diabetic diet,     Hemoglobin A1c (POC) % 7.3  8.6  11.5     Hemoglobin A1c (POC) % 8.6      Current Outpatient Medications   Medication Sig Dispense Refill    Levothyroxine 13 mcg cap One cap daily before breakfast 30 Capsule 5    levothyroxine (SYNTHROID) 25 mcg tablet Take 1 Tablet by mouth Daily (before breakfast). 30 Tablet 4    spironolactone (ALDACTONE) 25 mg tablet Take 1 Tablet by mouth in the morning. 60 Tablet 2    Aspirin, Buffered 81 mg tab Take 1 Tab by mouth daily.  30 Tab 11    nebivoloL (BYSTOLIC) 5 mg tablet TAKE 1 TABLET BY MOUTH  DAILY 90 Tablet 3     Allergies   Allergen Reactions    Oxycontin [Oxycodone] Nausea and Vomiting    Shellfish Derived Hives     Past Medical History:   Diagnosis Date    BMI 33.0-33.9,adult 1/29/2018    Cardiomegaly - hypertensive 6/4/2015    Family history of sarcoidosis 6/4/2015    HTN (hypertension) 3/5/14 notes    noelle urena notes rec'd    Hypercholesteremia 9/24/2014    Hypothyroidism 10/8/2014    Irregular heart beat 9/24/2014    Midline thoracic back pain 6/4/2015    MVP (mitral valve prolapse) 9/24/2014    Prediabetes 9/24/2014    Screen for colon cancer 1/29/2018    Thyroid disease     Vitamin D deficiency 9/24/2014     Past Surgical History:   Procedure Laterality Date    HX GYN      HX HYSTERECTOMY N/A 1995    HX UROLOGICAL       No family history on file.  Social History     Tobacco Use    Smoking status: Former     Packs/day: 0.50     Types: Cigarettes     Quit date: 1997     Years since quittin.3    Smokeless tobacco: Never   Substance Use Topics    Alcohol use: No      Lab Results   Component Value Date/Time    Hemoglobin A1c 10.9 (H) 2022 10:10 AM    Hemoglobin A1c 6.5 (H) 2020 11:18 AM    Hemoglobin A1c 5.5 2016 08:24 AM    Glucose 348 (H) 2022 10:10 AM    LDL, calculated 90.8 2022 10:10 AM    Creatinine 0.70 2022 10:10 AM      Lab Results   Component Value Date/Time    Cholesterol, total 170 2022 10:10 AM    HDL Cholesterol 44 2022 10:10 AM    LDL, calculated 90.8 2022 10:10 AM    Triglyceride 176 (H) 2022 10:10 AM    CHOL/HDL Ratio 3.9 2022 10:10 AM        Review of Systems   Constitutional:  Negative for chills, fever and malaise/fatigue. HENT:  Negative for nosebleeds. Eyes:  Negative for pain. Respiratory:  Negative for cough and wheezing. Cardiovascular:  Negative for chest pain and leg swelling. Gastrointestinal:  Negative for constipation, diarrhea and nausea. Genitourinary:  Negative for frequency. Musculoskeletal:  Negative for joint pain and myalgias. Skin:  Negative for rash. Neurological:  Negative for loss of consciousness and headaches. Endo/Heme/Allergies:  Does not bruise/bleed easily. Psychiatric/Behavioral:  Negative for depression. The patient is not nervous/anxious and does not have insomnia. All other systems reviewed and are negative. Physical Exam  Vitals and nursing note reviewed. Constitutional:       Appearance: She is well-developed. HENT:      Head: Normocephalic and atraumatic. Eyes:      Conjunctiva/sclera: Conjunctivae normal.   Cardiovascular:      Rate and Rhythm: Normal rate and regular rhythm. Heart sounds: Normal heart sounds. No murmur heard.   Pulmonary:      Effort: Pulmonary effort is normal. Breath sounds: Normal breath sounds. Abdominal:      General: Bowel sounds are normal. There is no distension. Palpations: Abdomen is soft. Musculoskeletal:         General: Normal range of motion. Cervical back: Normal range of motion and neck supple. Lymphadenopathy:      Cervical: No cervical adenopathy. Skin:     Findings: No erythema. Neurological:      Mental Status: She is alert and oriented to person, place, and time. Psychiatric:         Behavior: Behavior normal.       ASSESSMENT and PLAN    ICD-10-CM ICD-9-CM    1. Hypothyroidism, unspecified type  E03.9 244.9 Levothyroxine 13 mcg cap      levothyroxine (SYNTHROID) 25 mcg tablet      spironolactone (ALDACTONE) 25 mg tablet      2. Secondary hypertension, unspecified  I15.9 405.99 Levothyroxine 13 mcg cap      levothyroxine (SYNTHROID) 25 mcg tablet      spironolactone (ALDACTONE) 25 mg tablet      3.  Familial hyperaldosteronism (HCC)  E26.02 255.11 Levothyroxine 13 mcg cap      levothyroxine (SYNTHROID) 25 mcg tablet      spironolactone (ALDACTONE) 25 mg tablet        lab results and schedule of future lab studies reviewed with patient  reviewed medications and side effects in detail  Wean off bystolic  HTN not controlled at this time, will change patient's Bp meds,  Discussed sodium restriction,  k rich diet,  maintaining ideal body weight and regular exercise program such as daily walking 30 min per day most days of the week,  medication compliance advised, was told to call back for rfs or of any concern, regardless patient blood pressure need to be checked in 7 to 14 days for better outcome patient was told to do home monitoring if blood pressure reading greater than 140/90 or less than 90/60 patient was told to call for further advice patient acknowledged understanding and agreed

## 2022-08-11 DIAGNOSIS — I10 HTN, GOAL BELOW 130/80: ICD-10-CM

## 2022-08-11 DIAGNOSIS — E03.8 OTHER SPECIFIED HYPOTHYROIDISM: ICD-10-CM

## 2022-08-11 DIAGNOSIS — E78.00 HYPERCHOLESTEREMIA: ICD-10-CM

## 2022-08-15 RX ORDER — NEBIVOLOL 5 MG/1
TABLET ORAL
Qty: 90 TABLET | Refills: 3 | Status: SHIPPED | OUTPATIENT
Start: 2022-08-15 | End: 2022-08-19 | Stop reason: ALTCHOICE

## 2022-08-18 ENCOUNTER — TELEPHONE (OUTPATIENT)
Dept: FAMILY MEDICINE CLINIC | Age: 62
End: 2022-08-18

## 2022-08-18 DIAGNOSIS — I15.9 SECONDARY HYPERTENSION, UNSPECIFIED: ICD-10-CM

## 2022-08-18 DIAGNOSIS — E03.9 HYPOTHYROIDISM, UNSPECIFIED TYPE: ICD-10-CM

## 2022-08-18 DIAGNOSIS — E26.02: ICD-10-CM

## 2022-08-18 NOTE — TELEPHONE ENCOUNTER
Patient called requesting a call back from Dr. Huy Bowers to discuss her BP medication. She can be reached at [phone].

## 2022-08-19 DIAGNOSIS — E03.9 HYPOTHYROIDISM, UNSPECIFIED TYPE: ICD-10-CM

## 2022-08-19 DIAGNOSIS — E26.02: ICD-10-CM

## 2022-08-19 DIAGNOSIS — I15.9 SECONDARY HYPERTENSION, UNSPECIFIED: ICD-10-CM

## 2022-08-19 RX ORDER — SPIRONOLACTONE 25 MG/1
25 TABLET ORAL 2 TIMES DAILY
Qty: 180 TABLET | Refills: 3 | Status: SHIPPED | OUTPATIENT
Start: 2022-08-19

## 2022-10-05 NOTE — PROGRESS NOTES
Lab results reviewed with the patient repeat abnormal on the next visit continue with lifestyle modification

## 2022-12-07 ENCOUNTER — OFFICE VISIT (OUTPATIENT)
Dept: FAMILY MEDICINE CLINIC | Age: 62
End: 2022-12-07
Payer: COMMERCIAL

## 2022-12-07 VITALS
SYSTOLIC BLOOD PRESSURE: 162 MMHG | WEIGHT: 171 LBS | HEART RATE: 66 BPM | DIASTOLIC BLOOD PRESSURE: 103 MMHG | BODY MASS INDEX: 32.28 KG/M2 | OXYGEN SATURATION: 95 % | HEIGHT: 61 IN | TEMPERATURE: 98 F | RESPIRATION RATE: 16 BRPM

## 2022-12-07 DIAGNOSIS — E11.8 TYPE II DIABETES MELLITUS WITH COMPLICATION (HCC): Primary | ICD-10-CM

## 2022-12-07 DIAGNOSIS — I15.9 SECONDARY HYPERTENSION, UNSPECIFIED: ICD-10-CM

## 2022-12-07 DIAGNOSIS — E26.02: ICD-10-CM

## 2022-12-07 DIAGNOSIS — E03.9 HYPOTHYROIDISM, UNSPECIFIED TYPE: ICD-10-CM

## 2022-12-07 LAB — HBA1C MFR BLD HPLC: 6.8 %

## 2022-12-07 PROCEDURE — 83036 HEMOGLOBIN GLYCOSYLATED A1C: CPT | Performed by: FAMILY MEDICINE

## 2022-12-07 PROCEDURE — 99214 OFFICE O/P EST MOD 30 MIN: CPT | Performed by: FAMILY MEDICINE

## 2022-12-07 PROCEDURE — 3044F HG A1C LEVEL LT 7.0%: CPT | Performed by: FAMILY MEDICINE

## 2022-12-07 PROCEDURE — 3078F DIAST BP <80 MM HG: CPT | Performed by: FAMILY MEDICINE

## 2022-12-07 PROCEDURE — 3074F SYST BP LT 130 MM HG: CPT | Performed by: FAMILY MEDICINE

## 2022-12-07 RX ORDER — LEVOTHYROXINE SODIUM 25 UG/1
37.5 TABLET ORAL
Qty: 45 TABLET | Refills: 5 | Status: SHIPPED | OUTPATIENT
Start: 2022-12-07

## 2022-12-07 RX ORDER — SPIRONOLACTONE 50 MG/1
50 TABLET, FILM COATED ORAL 2 TIMES DAILY
Qty: 180 TABLET | Refills: 3 | Status: SHIPPED | OUTPATIENT
Start: 2022-12-07

## 2022-12-07 NOTE — PROGRESS NOTES
Patient identified with two identification factors, Name and Date of Birth. Chief Complaint   Patient presents with    Hypertension     3 month follow-up. Hyperthyroidism       Visit Vitals  BP (!) 162/103 (BP 1 Location: Right arm, BP Patient Position: Sitting, BP Cuff Size: Adult)   Pulse 66   Temp 98 °F (36.7 °C) (Oral)   Resp 16   Ht 5' 1\" (1.549 m)   Wt 171 lb (77.6 kg)   SpO2 95%   BMI 32.31 kg/m²       Health Maintenance Due   Topic    Pneumococcal 0-64 years (1 - PCV)    Foot Exam Q1     MICROALBUMIN Q1     Eye Exam Retinal or Dilated     Shingrix Vaccine Age 50> (1 of 2)    Colorectal Cancer Screening Combo     COVID-19 Vaccine (3 - Booster for Pfizer series)    Flu Vaccine (1)        1. \"Have you been to the ER, urgent care clinic since your last visit? Hospitalized since your last visit? \" No    2. \"Have you seen or consulted any other health care providers outside of the [address] [place] [address] since your last visit? \" No     3. For patients aged 39-70: Has the patient had a colonoscopy / FIT/ Cologuard? No      If the patient is female:    4. For patients aged 41-77: Has the patient had a mammogram within the past 2 years? No      5. For patients aged 21-65: Has the patient had a pap smear?  Yes.

## 2022-12-07 NOTE — PROGRESS NOTES
HISTORY OF PRESENT ILLNESS  Krzysztof Oneill is a 65 y.o. female, w/ hypothyrodism and primary hyperaldos, Not wanted to get blood work up done today, who present for f/u regarding the diabetic state, and bp check,  patient has been compliant with diabetic diet, no Rf needed for today,   patient currently denies tingling sensation, has no polyurea and polydipsia,   Hemoglobin A1c (POC) % 6.8  7.3  8.6  11.5       present for Bp check , compliant w/ the bp meds, but not with  a low salt diet, an  active life style, patient does obtain the bp at home  >140/90,    pt denies Chest Pain, has no legs swelling no lightheadedness, in addition patient also denies any racing heart palpitation at this visit,    the pat has not been feeling anxious, and  Has not been feeling stressed out, otherwise feeling better since the last visit     Patient Active Problem List    Diagnosis Date Noted    [facility] 48/44/8327    BMI 33.0-33.9,adult 01/29/2018    Cardiomegaly - hypertensive 06/04/2015    Midline thoracic back pain 06/04/2015    Family history of sarcoidosis 06/04/2015    Hypothyroidism 10/08/2014    UTI (lower urinary tract infection) 09/29/2014    HTN, goal below 130/80 09/24/2014    Hypercholesteremia 09/24/2014    Prediabetes 09/24/2014    Vitamin D deficiency 09/24/2014    MVP (mitral valve prolapse) 09/24/2014    Irregular heart beat 09/24/2014     Current Outpatient Medications   Medication Sig Dispense Refill    spironolactone (ALDACTONE) 25 mg tablet Take 1 Tablet by mouth two (2) times a day. 180 Tablet 3    levothyroxine (SYNTHROID) 25 mcg tablet Take 1 Tablet by mouth Daily (before breakfast). 30 Tablet 4    Levothyroxine 13 mcg cap One cap daily before breakfast (Patient not taking: Reported on 12/7/2022) 30 Capsule 5    Aspirin, Buffered 81 mg tab Take 1 Tab by mouth daily.  (Patient not taking: Reported on 12/7/2022) 30 Tab 11     Allergies   Allergen Reactions    Oxycontin [Oxycodone] Nausea and Vomiting    Shellfish Derived Hives     Past Medical History:   Diagnosis Date    BMI 33.0-33.9,adult 2018    Cardiomegaly - hypertensive 2015    Family history of sarcoidosis 2015    HTN (hypertension) 3/5/14 notes    noelle urena notes rec'd    Hypercholesteremia 2014    Hypothyroidism 10/8/2014    Irregular heart beat 2014    Midline thoracic back pain 2015    MVP (mitral valve prolapse) 2014    Prediabetes 2014    Screen for colon cancer 2018    Thyroid disease     Vitamin D deficiency 2014     Past Surgical History:   Procedure Laterality Date    HX GYN      HX HYSTERECTOMY N/A     HX UROLOGICAL       History reviewed. No pertinent family history. Social History     Tobacco Use    Smoking status: Former     Packs/day: 0.50     Types: Cigarettes     Quit date: 1997     Years since quittin.6    Smokeless tobacco: Never   Substance Use Topics    Alcohol use: No      Lab Results   Component Value Date/Time    Hemoglobin A1c 10.9 (H) 2022 10:10 AM    Hemoglobin A1c 6.5 (H) 2020 11:18 AM    Hemoglobin A1c 5.5 2016 08:24 AM    Glucose 348 (H) 2022 10:10 AM    LDL, calculated 90.8 2022 10:10 AM    Creatinine 0.70 2022 10:10 AM      Lab Results   Component Value Date/Time    Cholesterol, total 170 2022 10:10 AM    HDL Cholesterol 44 2022 10:10 AM    LDL, calculated 90.8 2022 10:10 AM    Triglyceride 176 (H) 2022 10:10 AM    CHOL/HDL Ratio 3.9 2022 10:10 AM        Review of Systems   Constitutional:  Negative for chills and fever. HENT:  Negative for congestion and nosebleeds. Eyes:  Negative for blurred vision and pain. Respiratory:  Negative for cough, shortness of breath and wheezing. Cardiovascular:  Negative for chest pain and leg swelling. Gastrointestinal:  Negative for constipation, diarrhea, nausea and vomiting.    Genitourinary:  Negative for dysuria and frequency. Musculoskeletal:  Negative for joint pain and myalgias. Skin:  Negative for itching and rash. Neurological:  Negative for dizziness, loss of consciousness and headaches. Psychiatric/Behavioral:  Negative for depression. The patient is not nervous/anxious and does not have insomnia. Physical Exam  Vitals and nursing note reviewed. Constitutional:       Appearance: She is well-developed. HENT:      Head: Normocephalic and atraumatic. Eyes:      Conjunctiva/sclera: Conjunctivae normal.      Pupils: Pupils are equal, round, and reactive to light. Neck:      Thyroid: No thyromegaly. Vascular: No JVD. Cardiovascular:      Rate and Rhythm: Normal rate and regular rhythm. Heart sounds: Normal heart sounds. No murmur heard. No friction rub. No gallop. Pulmonary:      Effort: Pulmonary effort is normal. No respiratory distress. Breath sounds: Normal breath sounds. No stridor. No wheezing or rales. Abdominal:      General: Bowel sounds are normal. There is no distension. Palpations: Abdomen is soft. There is no mass. Tenderness: There is no abdominal tenderness. Musculoskeletal:         General: No tenderness. Normal range of motion. Lymphadenopathy:      Cervical: No cervical adenopathy. Skin:     Findings: No erythema or rash. Neurological:      Mental Status: She is alert and oriented to person, place, and time. Cranial Nerves: No cranial nerve deficit. Deep Tendon Reflexes: Reflexes are normal and symmetric. Psychiatric:         Behavior: Behavior normal.       ASSESSMENT and PLAN    ICD-10-CM ICD-9-CM    1. Type II diabetes mellitus with complication (HCC)  B18.2 250.90 AMB POC HEMOGLOBIN A1C      2. Hypothyroidism, unspecified type  E03.9 244.9 levothyroxine (SYNTHROID) 25 mcg tablet      spironolactone (ALDACTONE) 50 mg tablet      AMB POC HEMOGLOBIN A1C      3.  Secondary hypertension, unspecified  I15.9 405.99 levothyroxine (SYNTHROID) 25 mcg tablet      spironolactone (ALDACTONE) 50 mg tablet      AMB POC HEMOGLOBIN A1C      4.  Familial hyperaldosteronism (HCC)  E26.02 255.11 levothyroxine (SYNTHROID) 25 mcg tablet      spironolactone (ALDACTONE) 50 mg tablet      AMB POC HEMOGLOBIN A1C      Cont with diabetic diet rtc arnel fasting state,  Low fat lowchol diet, rtc in 7-14 days for repeat bp , have a low salt diet  lab results and schedule of future lab studies reviewed with patient  reviewed diet, exercise and weight control

## 2023-07-13 DIAGNOSIS — I15.9 SECONDARY HYPERTENSION, UNSPECIFIED: ICD-10-CM

## 2023-07-13 DIAGNOSIS — E03.9 HYPOTHYROIDISM, UNSPECIFIED: ICD-10-CM

## 2023-07-14 RX ORDER — LEVOTHYROXINE SODIUM 0.03 MG/1
TABLET ORAL
Qty: 45 TABLET | Refills: 5 | Status: SHIPPED | OUTPATIENT
Start: 2023-07-14

## 2023-07-14 NOTE — TELEPHONE ENCOUNTER
Last appointment: 12/7/22  Next appointment: none  Previous refill encounter(s): 12/7/22 #45 with 5 refills    Requested Prescriptions     Pending Prescriptions Disp Refills    levothyroxine (SYNTHROID) 25 MCG tablet [Pharmacy Med Name: LEVOTHYROXINE 25 MCG TABLET] 45 tablet 5     Sig: TAKE 1.5 TABLETS BY MOUTH DAILY (BEFORE BREAKFAST). For Pharmacy Admin Tracking Only    Program: Medication Refill  CPA in place:    Recommendation Provided To:    Intervention Detail: New Rx: 1, reason: Patient Preference  Intervention Accepted By:   Coleen Bhatti Closed?:    Time Spent (min): 5

## 2023-12-12 DIAGNOSIS — I15.9 SECONDARY HYPERTENSION, UNSPECIFIED: ICD-10-CM

## 2023-12-12 DIAGNOSIS — E03.9 HYPOTHYROIDISM, UNSPECIFIED: ICD-10-CM

## 2023-12-13 RX ORDER — SPIRONOLACTONE 50 MG/1
50 TABLET, FILM COATED ORAL 2 TIMES DAILY
Qty: 60 TABLET | Refills: 0 | Status: SHIPPED | OUTPATIENT
Start: 2023-12-13

## 2024-01-11 DIAGNOSIS — I15.9 SECONDARY HYPERTENSION, UNSPECIFIED: ICD-10-CM

## 2024-01-11 DIAGNOSIS — E03.9 HYPOTHYROIDISM, UNSPECIFIED: ICD-10-CM

## 2024-01-11 NOTE — TELEPHONE ENCOUNTER
Last appointment: 12/7/22  Next appointment: 1/29/24  Previous refill encounter(s): 12/13/23 Aldactone 30 d/s, 7/14/23 Synthroid 30 d/s with 5 refills    Requested Prescriptions     Pending Prescriptions Disp Refills    spironolactone (ALDACTONE) 50 MG tablet [Pharmacy Med Name: SPIRONOLACTONE 50 MG TABLET] 60 tablet 0     Sig: Take 1 tablet by mouth 2 times daily    levothyroxine (SYNTHROID) 25 MCG tablet [Pharmacy Med Name: LEVOTHYROXINE 25 MCG TABLET] 45 tablet 0     Sig: TAKE 1.5 TABLETS BY MOUTH DAILY (BEFORE BREAKFAST).         For Pharmacy Admin Tracking Only    Program: Medication Refill  CPA in place:    Recommendation Provided To:   Intervention Detail: New Rx: 2, reason: Patient Preference  Intervention Accepted By:   Gap Closed?:    Time Spent (min): 5

## 2024-01-12 RX ORDER — LEVOTHYROXINE SODIUM 0.03 MG/1
TABLET ORAL
Qty: 45 TABLET | Refills: 0 | Status: SHIPPED | OUTPATIENT
Start: 2024-01-12

## 2024-01-12 RX ORDER — SPIRONOLACTONE 50 MG/1
50 TABLET, FILM COATED ORAL 2 TIMES DAILY
Qty: 60 TABLET | Refills: 0 | Status: SHIPPED | OUTPATIENT
Start: 2024-01-12

## 2024-01-29 ENCOUNTER — OFFICE VISIT (OUTPATIENT)
Age: 64
End: 2024-01-29
Payer: COMMERCIAL

## 2024-01-29 VITALS
TEMPERATURE: 97.9 F | SYSTOLIC BLOOD PRESSURE: 139 MMHG | OXYGEN SATURATION: 96 % | WEIGHT: 166 LBS | BODY MASS INDEX: 31.34 KG/M2 | RESPIRATION RATE: 19 BRPM | HEART RATE: 90 BPM | HEIGHT: 61 IN | DIASTOLIC BLOOD PRESSURE: 89 MMHG

## 2024-01-29 DIAGNOSIS — I34.1 MVP (MITRAL VALVE PROLAPSE): ICD-10-CM

## 2024-01-29 DIAGNOSIS — Z00.00 ENCOUNTER FOR WELL ADULT EXAM WITHOUT ABNORMAL FINDINGS: Primary | ICD-10-CM

## 2024-01-29 DIAGNOSIS — E03.9 HYPOTHYROIDISM, UNSPECIFIED TYPE: ICD-10-CM

## 2024-01-29 DIAGNOSIS — I10 HTN, GOAL BELOW 130/80: ICD-10-CM

## 2024-01-29 DIAGNOSIS — Z12.11 SCREENING FOR MALIGNANT NEOPLASM OF COLON: ICD-10-CM

## 2024-01-29 DIAGNOSIS — Z90.710 HISTORY OF TOTAL HYSTERECTOMY: ICD-10-CM

## 2024-01-29 DIAGNOSIS — I15.9 SECONDARY HYPERTENSION, UNSPECIFIED: ICD-10-CM

## 2024-01-29 PROCEDURE — 99396 PREV VISIT EST AGE 40-64: CPT | Performed by: FAMILY MEDICINE

## 2024-01-29 PROCEDURE — 3075F SYST BP GE 130 - 139MM HG: CPT | Performed by: FAMILY MEDICINE

## 2024-01-29 PROCEDURE — 3079F DIAST BP 80-89 MM HG: CPT | Performed by: FAMILY MEDICINE

## 2024-01-29 RX ORDER — SPIRONOLACTONE 50 MG/1
50 TABLET, FILM COATED ORAL 2 TIMES DAILY
Qty: 180 TABLET | Refills: 3 | Status: SHIPPED | OUTPATIENT
Start: 2024-01-29

## 2024-01-29 SDOH — ECONOMIC STABILITY: HOUSING INSECURITY
IN THE LAST 12 MONTHS, WAS THERE A TIME WHEN YOU DID NOT HAVE A STEADY PLACE TO SLEEP OR SLEPT IN A SHELTER (INCLUDING NOW)?: NO

## 2024-01-29 SDOH — ECONOMIC STABILITY: FOOD INSECURITY: WITHIN THE PAST 12 MONTHS, THE FOOD YOU BOUGHT JUST DIDN'T LAST AND YOU DIDN'T HAVE MONEY TO GET MORE.: NEVER TRUE

## 2024-01-29 SDOH — ECONOMIC STABILITY: FOOD INSECURITY: WITHIN THE PAST 12 MONTHS, YOU WORRIED THAT YOUR FOOD WOULD RUN OUT BEFORE YOU GOT MONEY TO BUY MORE.: NEVER TRUE

## 2024-01-29 SDOH — ECONOMIC STABILITY: INCOME INSECURITY: HOW HARD IS IT FOR YOU TO PAY FOR THE VERY BASICS LIKE FOOD, HOUSING, MEDICAL CARE, AND HEATING?: NOT HARD AT ALL

## 2024-01-29 ASSESSMENT — PATIENT HEALTH QUESTIONNAIRE - PHQ9
1. LITTLE INTEREST OR PLEASURE IN DOING THINGS: 0
SUM OF ALL RESPONSES TO PHQ QUESTIONS 1-9: 0
SUM OF ALL RESPONSES TO PHQ QUESTIONS 1-9: 0
SUM OF ALL RESPONSES TO PHQ9 QUESTIONS 1 & 2: 0
SUM OF ALL RESPONSES TO PHQ QUESTIONS 1-9: 0
2. FEELING DOWN, DEPRESSED OR HOPELESS: 0
SUM OF ALL RESPONSES TO PHQ QUESTIONS 1-9: 0

## 2024-01-29 NOTE — PROGRESS NOTES
Chief Complaint   Patient presents with    Annual Exam     Vitals:    24 1006 24 1010   BP: (!) 157/100 (!) 163/115   Site: Left Upper Arm Right Upper Arm   Position: Sitting Sitting   Cuff Size: Large Adult Large Adult   Pulse: 90    Resp: 19    Temp: 97.9 °F (36.6 °C)    TempSrc: Temporal    SpO2: 96%    Weight: 75.3 kg (166 lb)    Height: 1.549 m (5' 1\")       \"Have you been to the ER, urgent care clinic since your last visit?  Hospitalized since your last visit?\"    NO    “Have you seen or consulted any other health care providers outside of [facility] since your last visit?”    NO    “Have you had a colorectal cancer screening such as a colonoscopy/FIT/Cologuard?    NO     Have you had a mammogram?”   YES           Vitals:    24 1010   BP: (!) 163/115   Pulse:    Resp:    Temp:    SpO2:         Health Maintenance Due   Topic Date Due    Diabetic foot exam  Never done    Diabetic Alb to Cr ratio (uACR) test  Never done    Diabetic retinal exam  Never done    Colorectal Cancer Screen  Never done    Lipids  2023    GFR test (Diabetes, CKD 3-4, OR last GFR 15-59)  2023    Breast cancer screen  2023    A1C test (Diabetic or Prediabetic)  2023    Depression Screen  2023        The patient, Diana Hercules, identity was verified by name and

## 2024-01-29 NOTE — PROGRESS NOTES
Well Adult Note  Name: Diana Hercules Today’s Date: 2024   MRN: [id number] Sex: Female   Age: 63 y.o. Ethnicity:  /    : 1960 Race: Black /       Diana Hercules is here for well adult exam.       Review of Systems      Constitutional: no chills and fever,  , nad     HENT: no ear pain or nosebleeds. No blurred vision  Respiratory: no shortness of breath, wheezing cough   Cardiovascular: Has no chest pain, ,and racing heart .   Gastrointestinal: No constipation, diarrhea, nausea and vomiting.   Genitourinary: No frequency.   Musculoskeletal: Negative for joint pain.   Skin: no itching, no rash.   Neurological: Negative for dizziness, no tremors  Psychiatric/Behavioral: Negative for depression, is not nervous/anxious.        Allergies   Allergen Reactions    Shellfish Allergy Hives    Oxycodone Nausea And Vomiting         Prior to Visit Medications    Medication Sig Taking? Authorizing Provider   spironolactone (ALDACTONE) 50 MG tablet Take 1 tablet by mouth 2 times daily Yes Chucho Barrientos MD   levothyroxine (SYNTHROID) 25 MCG tablet TAKE 1.5 TABLETS BY MOUTH DAILY (BEFORE BREAKFAST). Yes Chucho Barrientos MD   Levothyroxine Sodium 13 MCG CAPS One cap daily before breakfast  Patient not taking: Reported on 2024  Automatic Reconciliation, Ar         Past Medical History:   Diagnosis Date    BMI 33.0-33.9,adult 2018    Cardiomegaly - hypertensive 2015    Family history of sarcoidosis 2015    HTN (hypertension) 3/5/14 notes    norma disla notes rec'd    Hypercholesteremia 2014    Hypothyroidism 10/8/2014    Irregular heart beat 2014    Midline thoracic back pain 2015    MVP (mitral valve prolapse) 2014    Prediabetes 2014    Screen for colon cancer 2018    Thyroid disease     Vitamin D deficiency 2014       Past Surgical History:   Procedure Laterality Date    GYN      HYSTERECTOMY (CERVIX STATUS UNKNOWN) N/A     UROLOGICAL

## 2024-01-30 LAB
ALBUMIN SERPL-MCNC: 3.8 G/DL (ref 3.5–5)
ALBUMIN/GLOB SERPL: 0.8 (ref 1.1–2.2)
ALP SERPL-CCNC: 90 U/L (ref 45–117)
ALT SERPL-CCNC: 34 U/L (ref 12–78)
ANION GAP SERPL CALC-SCNC: 6 MMOL/L (ref 5–15)
AST SERPL-CCNC: 11 U/L (ref 15–37)
BILIRUB SERPL-MCNC: 0.7 MG/DL (ref 0.2–1)
BUN SERPL-MCNC: 16 MG/DL (ref 6–20)
BUN/CREAT SERPL: 22 (ref 12–20)
CALCIUM SERPL-MCNC: 10 MG/DL (ref 8.5–10.1)
CHLORIDE SERPL-SCNC: 101 MMOL/L (ref 97–108)
CHOLEST SERPL-MCNC: 187 MG/DL
CO2 SERPL-SCNC: 28 MMOL/L (ref 21–32)
CREAT SERPL-MCNC: 0.72 MG/DL (ref 0.55–1.02)
CREAT UR-MCNC: 20.1 MG/DL
ERYTHROCYTE [DISTWIDTH] IN BLOOD BY AUTOMATED COUNT: 12.7 % (ref 11.5–14.5)
GLOBULIN SER CALC-MCNC: 4.5 G/DL (ref 2–4)
GLUCOSE SERPL-MCNC: 332 MG/DL (ref 65–100)
HCT VFR BLD AUTO: 40.3 % (ref 35–47)
HDLC SERPL-MCNC: 49 MG/DL
HDLC SERPL: 3.8 (ref 0–5)
HGB BLD-MCNC: 14 G/DL (ref 11.5–16)
LDLC SERPL CALC-MCNC: 103 MG/DL (ref 0–100)
MCH RBC QN AUTO: 28.9 PG (ref 26–34)
MCHC RBC AUTO-ENTMCNC: 34.7 G/DL (ref 30–36.5)
MCV RBC AUTO: 83.3 FL (ref 80–99)
MICROALBUMIN UR-MCNC: 0.89 MG/DL
MICROALBUMIN/CREAT UR-RTO: 44 MG/G (ref 0–30)
NRBC # BLD: 0 K/UL (ref 0–0.01)
NRBC BLD-RTO: 0 PER 100 WBC
PLATELET # BLD AUTO: 274 K/UL (ref 150–400)
PMV BLD AUTO: 12.1 FL (ref 8.9–12.9)
POTASSIUM SERPL-SCNC: 4.4 MMOL/L (ref 3.5–5.1)
PROT SERPL-MCNC: 8.3 G/DL (ref 6.4–8.2)
RBC # BLD AUTO: 4.84 M/UL (ref 3.8–5.2)
SODIUM SERPL-SCNC: 135 MMOL/L (ref 136–145)
T4 FREE SERPL-MCNC: 1.2 NG/DL (ref 0.8–1.5)
TRIGL SERPL-MCNC: 175 MG/DL
TSH SERPL DL<=0.05 MIU/L-ACNC: 0.47 UIU/ML (ref 0.36–3.74)
VLDLC SERPL CALC-MCNC: 35 MG/DL
WBC # BLD AUTO: 8.8 K/UL (ref 3.6–11)

## 2024-01-31 LAB
EST. AVERAGE GLUCOSE BLD GHB EST-MCNC: 263 MG/DL
HBA1C MFR BLD: 10.8 % (ref 4–5.6)

## 2024-02-13 DIAGNOSIS — E03.9 HYPOTHYROIDISM, UNSPECIFIED: ICD-10-CM

## 2024-02-13 DIAGNOSIS — I15.9 SECONDARY HYPERTENSION, UNSPECIFIED: ICD-10-CM

## 2024-02-14 RX ORDER — LEVOTHYROXINE SODIUM 0.03 MG/1
TABLET ORAL
Qty: 45 TABLET | Refills: 2 | Status: SHIPPED | OUTPATIENT
Start: 2024-02-14

## 2024-02-14 NOTE — TELEPHONE ENCOUNTER
Last appointment: 1/29/24  Next appointment: none  Previous refill encounter(s): 1/12/24 30 d/s    Requested Prescriptions     Pending Prescriptions Disp Refills    levothyroxine (SYNTHROID) 25 MCG tablet [Pharmacy Med Name: LEVOTHYROXINE 25 MCG TABLET] 45 tablet 2     Sig: TAKE 1 AND 1/2 TABLET BY MOUTH DAILY BEFORE BREAKFAST         For Pharmacy Admin Tracking Only    Program: Medication Refill  CPA in place:    Recommendation Provided To:   Intervention Detail: New Rx: 1, reason: Patient Preference  Intervention Accepted By:   Gap Closed?:    Time Spent (min): 5

## 2024-04-11 DIAGNOSIS — E03.9 HYPOTHYROIDISM, UNSPECIFIED: ICD-10-CM

## 2024-04-11 DIAGNOSIS — I15.9 SECONDARY HYPERTENSION, UNSPECIFIED: ICD-10-CM

## 2024-04-11 NOTE — TELEPHONE ENCOUNTER
Pharmacy is requesting a 90 d/s    Last appointment: 1/29/24  Next appointment: none    Requested Prescriptions     Pending Prescriptions Disp Refills    levothyroxine (SYNTHROID) 25 MCG tablet [Pharmacy Med Name: LEVOTHYROXINE 25 MCG TABLET] 135 tablet 1     Sig: TAKE 1 AND 1/2 TABLET BY MOUTH DAILY BEFORE BREAKFAST         For Pharmacy Admin Tracking Only    Program: Medication Refill  CPA in place:    Recommendation Provided To:   Intervention Detail: New Rx: 1, reason: Patient Preference  Intervention Accepted By:   Gap Closed?:    Time Spent (min): 5

## 2024-04-12 RX ORDER — LEVOTHYROXINE SODIUM 0.03 MG/1
TABLET ORAL
Qty: 135 TABLET | Refills: 1 | Status: SHIPPED | OUTPATIENT
Start: 2024-04-12

## 2024-05-25 ENCOUNTER — APPOINTMENT (OUTPATIENT)
Facility: HOSPITAL | Age: 64
DRG: 065 | End: 2024-05-25
Payer: COMMERCIAL

## 2024-05-25 ENCOUNTER — HOSPITAL ENCOUNTER (INPATIENT)
Facility: HOSPITAL | Age: 64
LOS: 1 days | Discharge: HOME | DRG: 065 | End: 2024-05-27
Attending: EMERGENCY MEDICINE | Admitting: STUDENT IN AN ORGANIZED HEALTH CARE EDUCATION/TRAINING PROGRAM
Payer: COMMERCIAL

## 2024-05-25 DIAGNOSIS — I63.9 CEREBROVASCULAR ACCIDENT (CVA), UNSPECIFIED MECHANISM (HCC): Primary | ICD-10-CM

## 2024-05-25 LAB
ALBUMIN SERPL-MCNC: 3.4 G/DL (ref 3.5–5)
ALBUMIN/GLOB SERPL: 0.7 (ref 1.1–2.2)
ALP SERPL-CCNC: 90 U/L (ref 45–117)
ALT SERPL-CCNC: 24 U/L (ref 12–78)
ANION GAP SERPL CALC-SCNC: 10 MMOL/L (ref 5–15)
AST SERPL-CCNC: 13 U/L (ref 15–37)
BASOPHILS # BLD: 0 K/UL (ref 0–0.1)
BASOPHILS NFR BLD: 0 % (ref 0–1)
BILIRUB SERPL-MCNC: 0.6 MG/DL (ref 0.2–1)
BUN SERPL-MCNC: 22 MG/DL (ref 6–20)
BUN/CREAT SERPL: 26 (ref 12–20)
CALCIUM SERPL-MCNC: 10.7 MG/DL (ref 8.5–10.1)
CHLORIDE SERPL-SCNC: 103 MMOL/L (ref 97–108)
CO2 SERPL-SCNC: 21 MMOL/L (ref 21–32)
CREAT SERPL-MCNC: 0.86 MG/DL (ref 0.55–1.02)
DIFFERENTIAL METHOD BLD: ABNORMAL
EOSINOPHIL # BLD: 0.1 K/UL (ref 0–0.4)
EOSINOPHIL NFR BLD: 1 % (ref 0–7)
ERYTHROCYTE [DISTWIDTH] IN BLOOD BY AUTOMATED COUNT: 12.1 % (ref 11.5–14.5)
GLOBULIN SER CALC-MCNC: 5 G/DL (ref 2–4)
GLUCOSE SERPL-MCNC: 335 MG/DL (ref 65–100)
HCT VFR BLD AUTO: 40.9 % (ref 35–47)
HGB BLD-MCNC: 14.2 G/DL (ref 11.5–16)
IMM GRANULOCYTES # BLD AUTO: 0.1 K/UL (ref 0–0.04)
IMM GRANULOCYTES NFR BLD AUTO: 1 % (ref 0–0.5)
INR PPP: 1 (ref 0.9–1.1)
LYMPHOCYTES # BLD: 3.9 K/UL (ref 0.8–3.5)
LYMPHOCYTES NFR BLD: 39 % (ref 12–49)
MCH RBC QN AUTO: 27.6 PG (ref 26–34)
MCHC RBC AUTO-ENTMCNC: 34.7 G/DL (ref 30–36.5)
MCV RBC AUTO: 79.6 FL (ref 80–99)
MONOCYTES # BLD: 0.8 K/UL (ref 0–1)
MONOCYTES NFR BLD: 8 % (ref 5–13)
NEUTS SEG # BLD: 5.2 K/UL (ref 1.8–8)
NEUTS SEG NFR BLD: 52 % (ref 32–75)
NRBC # BLD: 0 K/UL (ref 0–0.01)
NRBC BLD-RTO: 0 PER 100 WBC
PLATELET # BLD AUTO: 285 K/UL (ref 150–400)
PMV BLD AUTO: 11.6 FL (ref 8.9–12.9)
POTASSIUM SERPL-SCNC: 3.8 MMOL/L (ref 3.5–5.1)
PROT SERPL-MCNC: 8.4 G/DL (ref 6.4–8.2)
PROTHROMBIN TIME: 10.3 SEC (ref 9–11.1)
RBC # BLD AUTO: 5.14 M/UL (ref 3.8–5.2)
SODIUM SERPL-SCNC: 134 MMOL/L (ref 136–145)
TROPONIN I SERPL HS-MCNC: 14 NG/L (ref 0–51)
WBC # BLD AUTO: 10.1 K/UL (ref 3.6–11)

## 2024-05-25 PROCEDURE — 85025 COMPLETE CBC W/AUTO DIFF WBC: CPT

## 2024-05-25 PROCEDURE — 96374 THER/PROPH/DIAG INJ IV PUSH: CPT

## 2024-05-25 PROCEDURE — 70553 MRI BRAIN STEM W/O & W/DYE: CPT

## 2024-05-25 PROCEDURE — 80053 COMPREHEN METABOLIC PANEL: CPT

## 2024-05-25 PROCEDURE — 93005 ELECTROCARDIOGRAM TRACING: CPT | Performed by: EMERGENCY MEDICINE

## 2024-05-25 PROCEDURE — 36415 COLL VENOUS BLD VENIPUNCTURE: CPT

## 2024-05-25 PROCEDURE — 85610 PROTHROMBIN TIME: CPT

## 2024-05-25 PROCEDURE — 6360000002 HC RX W HCPCS: Performed by: EMERGENCY MEDICINE

## 2024-05-25 PROCEDURE — 6360000004 HC RX CONTRAST MEDICATION: Performed by: EMERGENCY MEDICINE

## 2024-05-25 PROCEDURE — 99285 EMERGENCY DEPT VISIT HI MDM: CPT

## 2024-05-25 PROCEDURE — A9579 GAD-BASE MR CONTRAST NOS,1ML: HCPCS | Performed by: EMERGENCY MEDICINE

## 2024-05-25 PROCEDURE — 70450 CT HEAD/BRAIN W/O DYE: CPT

## 2024-05-25 PROCEDURE — 84484 ASSAY OF TROPONIN QUANT: CPT

## 2024-05-25 RX ORDER — LEVETIRACETAM 500 MG/5ML
2000 INJECTION, SOLUTION, CONCENTRATE INTRAVENOUS ONCE
Status: COMPLETED | OUTPATIENT
Start: 2024-05-25 | End: 2024-05-25

## 2024-05-25 RX ADMIN — GADOTERIDOL 15 ML: 279.3 INJECTION, SOLUTION INTRAVENOUS at 21:19

## 2024-05-25 RX ADMIN — LEVETIRACETAM 2000 MG: 100 INJECTION INTRAVENOUS at 20:32

## 2024-05-25 NOTE — ED NOTES
Bedside report given to MAYTE Gannon at this time. Pt resting in bed on monitor x2, call bell in reach, back from CT

## 2024-05-25 NOTE — ED NOTES
1830: MD Daniels at bedside to assess for possible stroke. States no stroke, will be TIA workup     1900: Pt states she feels like she is having another episode. States left arm feels warm \"wet\" and tingly. NIH remains 0. MD Daniels at bedside, no stroke to be called overhead at this time.

## 2024-05-25 NOTE — ED PROVIDER NOTES
[facility] EMERGENCY DEPT  EMERGENCY DEPARTMENT ENCOUNTER       Pt Name: Diana Hercules  MRN: [id number]  Birthdate 1960  Date of evaluation: 5/25/2024  Provider: Surendra Daniels MD   PCP: Chucho Barrientos MD  Note Started: 6:38 PM 5/25/24     CHIEF COMPLAINT       Chief Complaint   Patient presents with    Tingling     Pt BIBEMS from home cc of two episodes of tingling that lasted for about 20 minutes. Reports left arm and leg tingling during the episodes, denies any current sx. BGL in 300s for EMS, pt states she is prediabetic. MD Daniels called to room for stroke eval      HISTORY OF PRESENT ILLNESS: 1 or more elements      History From: Patient, History limited by: None     Diana Hercules is a 63 y.o. female who presents with transient numbness to left arm and left leg.  She reports around 11 AM today she had a 10-minute episode of numbness to her left hand and her left thigh.  No weakness, no headache, reports some difficulty concentrating at work this past week.  She denies changes to her hearing vision or speech.  No prior stroke.  History of prediabetes and high blood pressure.  Prehospital glucose was in the 300s.    Nursing Notes were all reviewed and agreed with or any disagreements were addressed in the HPI.     REVIEW OF SYSTEMS      Positives and Pertinent negatives as per HPI.    PAST HISTORY     Past Medical History:  Past Medical History:   Diagnosis Date    BMI 33.0-33.9,adult 1/29/2018    Cardiomegaly - hypertensive 6/4/2015    Family history of sarcoidosis 6/4/2015    HTN (hypertension) 3/5/14 notes    norma disla notes rec'd    Hypercholesteremia 9/24/2014    Hypothyroidism 10/8/2014    Irregular heart beat 9/24/2014    Midline thoracic back pain 6/4/2015    MVP (mitral valve prolapse) 9/24/2014    Prediabetes 9/24/2014    Screen for colon cancer 1/29/2018    Thyroid disease     Vitamin D deficiency 9/24/2014       Past Surgical History:  Past Surgical History:   Procedure Laterality Date    GYN

## 2024-05-26 ENCOUNTER — APPOINTMENT (OUTPATIENT)
Facility: HOSPITAL | Age: 64
DRG: 065 | End: 2024-05-26
Payer: COMMERCIAL

## 2024-05-26 PROBLEM — I63.9 CEREBROVASCULAR ACCIDENT (CVA), UNSPECIFIED MECHANISM (HCC): Status: ACTIVE | Noted: 2024-05-26

## 2024-05-26 PROBLEM — R53.1 ACUTE LEFT-SIDED WEAKNESS: Status: ACTIVE | Noted: 2024-05-26

## 2024-05-26 PROBLEM — I63.311 CEREBROVASCULAR ACCIDENT (CVA) DUE TO THROMBOSIS OF RIGHT MIDDLE CEREBRAL ARTERY (HCC): Status: ACTIVE | Noted: 2024-05-26

## 2024-05-26 LAB
EKG ATRIAL RATE: 107 BPM
EKG DIAGNOSIS: NORMAL
EKG P AXIS: 50 DEGREES
EKG P-R INTERVAL: 150 MS
EKG Q-T INTERVAL: 350 MS
EKG QRS DURATION: 80 MS
EKG QTC CALCULATION (BAZETT): 467 MS
EKG R AXIS: -3 DEGREES
EKG T AXIS: 35 DEGREES
EKG VENTRICULAR RATE: 107 BPM
GLUCOSE BLD STRIP.AUTO-MCNC: 187 MG/DL (ref 65–117)
GLUCOSE BLD STRIP.AUTO-MCNC: 202 MG/DL (ref 65–117)
GLUCOSE BLD STRIP.AUTO-MCNC: 256 MG/DL (ref 65–117)
GLUCOSE BLD STRIP.AUTO-MCNC: 298 MG/DL (ref 65–117)
GLUCOSE BLD STRIP.AUTO-MCNC: 321 MG/DL (ref 65–117)
SERVICE CMNT-IMP: ABNORMAL

## 2024-05-26 PROCEDURE — 2580000003 HC RX 258: Performed by: STUDENT IN AN ORGANIZED HEALTH CARE EDUCATION/TRAINING PROGRAM

## 2024-05-26 PROCEDURE — 97116 GAIT TRAINING THERAPY: CPT

## 2024-05-26 PROCEDURE — 97167 OT EVAL HIGH COMPLEX 60 MIN: CPT | Performed by: OCCUPATIONAL THERAPIST

## 2024-05-26 PROCEDURE — 97530 THERAPEUTIC ACTIVITIES: CPT

## 2024-05-26 PROCEDURE — 99223 1ST HOSP IP/OBS HIGH 75: CPT | Performed by: PSYCHIATRY & NEUROLOGY

## 2024-05-26 PROCEDURE — 82962 GLUCOSE BLOOD TEST: CPT

## 2024-05-26 PROCEDURE — 97161 PT EVAL LOW COMPLEX 20 MIN: CPT

## 2024-05-26 PROCEDURE — 6370000000 HC RX 637 (ALT 250 FOR IP): Performed by: STUDENT IN AN ORGANIZED HEALTH CARE EDUCATION/TRAINING PROGRAM

## 2024-05-26 PROCEDURE — 97535 SELF CARE MNGMENT TRAINING: CPT | Performed by: OCCUPATIONAL THERAPIST

## 2024-05-26 PROCEDURE — 97112 NEUROMUSCULAR REEDUCATION: CPT

## 2024-05-26 PROCEDURE — 70450 CT HEAD/BRAIN W/O DYE: CPT

## 2024-05-26 PROCEDURE — 2060000000 HC ICU INTERMEDIATE R&B

## 2024-05-26 RX ORDER — SODIUM CHLORIDE 0.9 % (FLUSH) 0.9 %
5-40 SYRINGE (ML) INJECTION PRN
Status: DISCONTINUED | OUTPATIENT
Start: 2024-05-26 | End: 2024-05-27 | Stop reason: HOSPADM

## 2024-05-26 RX ORDER — GLUCAGON 1 MG/ML
1 KIT INJECTION PRN
Status: DISCONTINUED | OUTPATIENT
Start: 2024-05-26 | End: 2024-05-27 | Stop reason: HOSPADM

## 2024-05-26 RX ORDER — SPIRONOLACTONE 25 MG/1
50 TABLET ORAL 2 TIMES DAILY
Status: DISCONTINUED | OUTPATIENT
Start: 2024-05-26 | End: 2024-05-27 | Stop reason: HOSPADM

## 2024-05-26 RX ORDER — ONDANSETRON 4 MG/1
4 TABLET, ORALLY DISINTEGRATING ORAL EVERY 8 HOURS PRN
Status: DISCONTINUED | OUTPATIENT
Start: 2024-05-26 | End: 2024-05-27 | Stop reason: HOSPADM

## 2024-05-26 RX ORDER — ONDANSETRON 2 MG/ML
4 INJECTION INTRAMUSCULAR; INTRAVENOUS EVERY 6 HOURS PRN
Status: DISCONTINUED | OUTPATIENT
Start: 2024-05-26 | End: 2024-05-27 | Stop reason: HOSPADM

## 2024-05-26 RX ORDER — DEXTROSE MONOHYDRATE 100 MG/ML
INJECTION, SOLUTION INTRAVENOUS CONTINUOUS PRN
Status: DISCONTINUED | OUTPATIENT
Start: 2024-05-26 | End: 2024-05-27 | Stop reason: HOSPADM

## 2024-05-26 RX ORDER — POLYETHYLENE GLYCOL 3350 17 G/17G
17 POWDER, FOR SOLUTION ORAL DAILY PRN
Status: DISCONTINUED | OUTPATIENT
Start: 2024-05-26 | End: 2024-05-27 | Stop reason: HOSPADM

## 2024-05-26 RX ORDER — ACETAMINOPHEN 325 MG/1
650 TABLET ORAL EVERY 6 HOURS PRN
Status: DISCONTINUED | OUTPATIENT
Start: 2024-05-26 | End: 2024-05-27 | Stop reason: HOSPADM

## 2024-05-26 RX ORDER — LEVOTHYROXINE SODIUM 0.07 MG/1
37 TABLET ORAL DAILY
Status: DISCONTINUED | OUTPATIENT
Start: 2024-05-26 | End: 2024-05-27 | Stop reason: HOSPADM

## 2024-05-26 RX ORDER — SODIUM CHLORIDE 9 MG/ML
INJECTION, SOLUTION INTRAVENOUS PRN
Status: DISCONTINUED | OUTPATIENT
Start: 2024-05-26 | End: 2024-05-27 | Stop reason: HOSPADM

## 2024-05-26 RX ORDER — SODIUM CHLORIDE 0.9 % (FLUSH) 0.9 %
5-40 SYRINGE (ML) INJECTION EVERY 12 HOURS SCHEDULED
Status: DISCONTINUED | OUTPATIENT
Start: 2024-05-26 | End: 2024-05-27 | Stop reason: HOSPADM

## 2024-05-26 RX ORDER — ACETAMINOPHEN 650 MG/1
650 SUPPOSITORY RECTAL EVERY 6 HOURS PRN
Status: DISCONTINUED | OUTPATIENT
Start: 2024-05-26 | End: 2024-05-27 | Stop reason: HOSPADM

## 2024-05-26 RX ORDER — INSULIN LISPRO 100 [IU]/ML
0-8 INJECTION, SOLUTION INTRAVENOUS; SUBCUTANEOUS
Status: DISCONTINUED | OUTPATIENT
Start: 2024-05-26 | End: 2024-05-27 | Stop reason: HOSPADM

## 2024-05-26 RX ORDER — SODIUM CHLORIDE 9 MG/ML
INJECTION, SOLUTION INTRAVENOUS CONTINUOUS
Status: ACTIVE | OUTPATIENT
Start: 2024-05-26 | End: 2024-05-26

## 2024-05-26 RX ORDER — ATORVASTATIN CALCIUM 20 MG/1
20 TABLET, FILM COATED ORAL NIGHTLY
Status: DISCONTINUED | OUTPATIENT
Start: 2024-05-26 | End: 2024-05-27 | Stop reason: HOSPADM

## 2024-05-26 RX ORDER — INSULIN LISPRO 100 [IU]/ML
0-4 INJECTION, SOLUTION INTRAVENOUS; SUBCUTANEOUS NIGHTLY
Status: DISCONTINUED | OUTPATIENT
Start: 2024-05-26 | End: 2024-05-27 | Stop reason: HOSPADM

## 2024-05-26 RX ADMIN — LEVOTHYROXINE SODIUM 37.5 MCG: 0.07 TABLET ORAL at 06:32

## 2024-05-26 RX ADMIN — SODIUM CHLORIDE: 9 INJECTION, SOLUTION INTRAVENOUS at 02:46

## 2024-05-26 RX ADMIN — INSULIN LISPRO 2 UNITS: 100 INJECTION, SOLUTION INTRAVENOUS; SUBCUTANEOUS at 17:08

## 2024-05-26 RX ADMIN — SPIRONOLACTONE 50 MG: 25 TABLET ORAL at 02:53

## 2024-05-26 RX ADMIN — ACETAMINOPHEN 650 MG: 325 TABLET ORAL at 09:14

## 2024-05-26 RX ADMIN — SODIUM CHLORIDE, PRESERVATIVE FREE 10 ML: 5 INJECTION INTRAVENOUS at 09:17

## 2024-05-26 RX ADMIN — INSULIN LISPRO 4 UNITS: 100 INJECTION, SOLUTION INTRAVENOUS; SUBCUTANEOUS at 12:12

## 2024-05-26 RX ADMIN — INSULIN LISPRO 6 UNITS: 100 INJECTION, SOLUTION INTRAVENOUS; SUBCUTANEOUS at 09:15

## 2024-05-26 RX ADMIN — ATORVASTATIN CALCIUM 20 MG: 20 TABLET, FILM COATED ORAL at 21:13

## 2024-05-26 RX ADMIN — SODIUM CHLORIDE, PRESERVATIVE FREE 10 ML: 5 INJECTION INTRAVENOUS at 21:14

## 2024-05-26 ASSESSMENT — PAIN DESCRIPTION - ORIENTATION: ORIENTATION: ANTERIOR

## 2024-05-26 ASSESSMENT — PAIN DESCRIPTION - LOCATION: LOCATION: HEAD

## 2024-05-26 ASSESSMENT — PAIN SCALES - GENERAL
PAINLEVEL_OUTOF10: 6
PAINLEVEL_OUTOF10: 0

## 2024-05-26 ASSESSMENT — LIFESTYLE VARIABLES
HOW MANY STANDARD DRINKS CONTAINING ALCOHOL DO YOU HAVE ON A TYPICAL DAY: PATIENT DOES NOT DRINK
HOW OFTEN DO YOU HAVE A DRINK CONTAINING ALCOHOL: NEVER

## 2024-05-26 ASSESSMENT — PAIN DESCRIPTION - DESCRIPTORS: DESCRIPTORS: ACHING

## 2024-05-26 NOTE — ED NOTES
Patient resting in stretcher with eyes closed at this time, no distress is observed. Respirations are present and unlabored. Side rails x2 in upright position, call light within reach.

## 2024-05-26 NOTE — PLAN OF CARE
Problem: Discharge Planning  Goal: Discharge to home or other facility with appropriate resources  Outcome: Progressing     Problem: Pain  Goal: Verbalizes/displays adequate comfort level or baseline comfort level  5/26/2024 1330 by Elham Parker RN  Outcome: Progressing  5/26/2024 0351 by Carmencita Montana RN  Outcome: Progressing     Problem: Safety - Adult  Goal: Free from fall injury  5/26/2024 1330 by Elham Parker RN  Outcome: Progressing  5/26/2024 0351 by Carmencita Montana RN  Outcome: Progressing

## 2024-05-26 NOTE — CONSULTS
Neurology Note    Patient ID:  Diana Hercules  [id number]  63 y.o.  1960      Date of Consultation:  May 26, 2024    Referring Physician: Dr. Chávez    Reason for Consultation:  stroke      Assessment and Plan:    The patient is a pleasant 63-year-old female who presents with transient left-sided numbness and tingling with mild headache and fine finger movement difficulties in her left upper extremity.  Symptoms have slowly improved.  Brain MRI did reveal acute stroke in the right hemisphere with mild hemorrhagic transformation    Acute stroke:  This was an ischemic stroke with mild hemorrhagic transformation  Follow-up head CT to be obtained to evaluate interval change in the hemorrhage.  Ideally, the patient will be on antiplatelet therapy long-term.  Will hold antiplatelet for now and evaluate for interval change on neuroimaging and then determine when to start antiplatelet therapy  Any change in neurological examination would necessitate emergent neuroimaging  Cannot get a CTA due to shellfish allergy  Doppler study was ordered  Echo ordered  The patient should be maintained on telemetry  Dyslipidemia: Patient should be on statin therapy.  No contraindication from a neuro perspective.  Goal LDL is less than 70.  Updated lipid panel ordered  Diabetes: Most recent hemoglobin A1c in January was greater than 10.  Updated hemoglobin A1c ordered  PT, OT consultation  I provided stroke education today in regards to risk factors for stroke and lifestyle modifications to help minimize the risk of future stroke.  This included medication compliance, regular follow up with primary care physician,  and healthy lifestyle habits (nutrition/exercise)      Incidental finding of a pontine/perimesencephalic epidermoid:  Neurosurgery was notified    Hypothyroidism    Neurology will continue to follow closely in this complicated patient with ongoing neurological symptoms necessitating additional testing. Updated orders

## 2024-05-26 NOTE — H&P
underlying mass lesion or infarction, minimal  hemorrhage may be superimposed. Correlation with MRI of the brain with and  without contrast for further delineation is recommended.    Likely benign focus/cystic lesion in the left cerebellum..    The findings were called to Dr. Daniels on 5/25/2024 at 7:40 p.m. by Dr. Patterson.  789        Xray Result (most recent):  No results found for this or any previous visit from the past 3650 days.        _______________________________________________________________________    TOTAL TIME:  75 Minutes   TOBACCO CESSATION COUNSELING: No    Critical Care Provided: Yes  (Minutes non procedure based)    I have spent  40 minutes of critical care time involved in lab review, consultations with specialist, family decision- making, bedside attention and documentation. During this entire length of time I was immediately available to the patient .     Critical Care:  The reason for providing this level of medical care for this critically ill patient was due to a critical illness that impaired one or more vital organ systems, such that there was a high probability of imminent or life threatening deterioration in the patient's condition. This care involved high complexity decision making to assess, manipulate, and support vital system functions, to treat this degree of vital organ system failure, and to prevent further life threatening deterioration of the patient’s condition.      Signed:Jluis Chávez DO  [facility] [place] [facility] Hospitalist/ Nocturnist  5/26/2024 1:53 AM    Please do not hesitate to reach out to myself or assigned provider on-call via [employer] system with questions or concerns.     Procedures: see electronic medical records for all procedures/Xrays and details which were not copied into this note but were reviewed prior to creation of Plan.

## 2024-05-27 ENCOUNTER — TELEPHONE (OUTPATIENT)
Age: 64
End: 2024-05-27

## 2024-05-27 VITALS
BODY MASS INDEX: 27.4 KG/M2 | TEMPERATURE: 97.8 F | WEIGHT: 148.9 LBS | RESPIRATION RATE: 17 BRPM | DIASTOLIC BLOOD PRESSURE: 93 MMHG | SYSTOLIC BLOOD PRESSURE: 131 MMHG | HEIGHT: 62 IN | OXYGEN SATURATION: 98 % | HEART RATE: 97 BPM

## 2024-05-27 PROBLEM — I63.311 CEREBROVASCULAR ACCIDENT (CVA) DUE TO THROMBOSIS OF RIGHT MIDDLE CEREBRAL ARTERY (HCC): Status: RESOLVED | Noted: 2024-05-26 | Resolved: 2024-05-27

## 2024-05-27 PROBLEM — I63.9 CEREBROVASCULAR ACCIDENT (CVA), UNSPECIFIED MECHANISM (HCC): Status: RESOLVED | Noted: 2024-05-26 | Resolved: 2024-05-27

## 2024-05-27 PROBLEM — R53.1 ACUTE LEFT-SIDED WEAKNESS: Status: RESOLVED | Noted: 2024-05-26 | Resolved: 2024-05-27

## 2024-05-27 LAB
ANION GAP SERPL CALC-SCNC: 6 MMOL/L (ref 5–15)
BUN SERPL-MCNC: 18 MG/DL (ref 6–20)
BUN/CREAT SERPL: 31 (ref 12–20)
CALCIUM SERPL-MCNC: 9.1 MG/DL (ref 8.5–10.1)
CHLORIDE SERPL-SCNC: 108 MMOL/L (ref 97–108)
CHOLEST SERPL-MCNC: 153 MG/DL
CO2 SERPL-SCNC: 22 MMOL/L (ref 21–32)
CREAT SERPL-MCNC: 0.58 MG/DL (ref 0.55–1.02)
ERYTHROCYTE [DISTWIDTH] IN BLOOD BY AUTOMATED COUNT: 12 % (ref 11.5–14.5)
EST. AVERAGE GLUCOSE BLD GHB EST-MCNC: 275 MG/DL
GLUCOSE BLD STRIP.AUTO-MCNC: 243 MG/DL (ref 65–117)
GLUCOSE BLD STRIP.AUTO-MCNC: 249 MG/DL (ref 65–117)
GLUCOSE SERPL-MCNC: 222 MG/DL (ref 65–100)
HBA1C MFR BLD: 11.2 % (ref 4–5.6)
HCT VFR BLD AUTO: 37 % (ref 35–47)
HDLC SERPL-MCNC: 35 MG/DL
HDLC SERPL: 4.4 (ref 0–5)
HGB BLD-MCNC: 12.5 G/DL (ref 11.5–16)
LDLC SERPL CALC-MCNC: 90 MG/DL (ref 0–100)
MAGNESIUM SERPL-MCNC: 1.8 MG/DL (ref 1.6–2.4)
MCH RBC QN AUTO: 27.4 PG (ref 26–34)
MCHC RBC AUTO-ENTMCNC: 33.8 G/DL (ref 30–36.5)
MCV RBC AUTO: 81.1 FL (ref 80–99)
NRBC # BLD: 0 K/UL (ref 0–0.01)
NRBC BLD-RTO: 0 PER 100 WBC
PLATELET # BLD AUTO: 254 K/UL (ref 150–400)
PMV BLD AUTO: 11.2 FL (ref 8.9–12.9)
POTASSIUM SERPL-SCNC: 3.5 MMOL/L (ref 3.5–5.1)
RBC # BLD AUTO: 4.56 M/UL (ref 3.8–5.2)
SERVICE CMNT-IMP: ABNORMAL
SERVICE CMNT-IMP: ABNORMAL
SODIUM SERPL-SCNC: 136 MMOL/L (ref 136–145)
TRIGL SERPL-MCNC: 140 MG/DL
VLDLC SERPL CALC-MCNC: 28 MG/DL
WBC # BLD AUTO: 9 K/UL (ref 3.6–11)

## 2024-05-27 PROCEDURE — 80048 BASIC METABOLIC PNL TOTAL CA: CPT

## 2024-05-27 PROCEDURE — 36415 COLL VENOUS BLD VENIPUNCTURE: CPT

## 2024-05-27 PROCEDURE — 80061 LIPID PANEL: CPT

## 2024-05-27 PROCEDURE — 99232 SBSQ HOSP IP/OBS MODERATE 35: CPT | Performed by: PSYCHIATRY & NEUROLOGY

## 2024-05-27 PROCEDURE — 83735 ASSAY OF MAGNESIUM: CPT

## 2024-05-27 PROCEDURE — 2580000003 HC RX 258: Performed by: STUDENT IN AN ORGANIZED HEALTH CARE EDUCATION/TRAINING PROGRAM

## 2024-05-27 PROCEDURE — 6370000000 HC RX 637 (ALT 250 FOR IP): Performed by: STUDENT IN AN ORGANIZED HEALTH CARE EDUCATION/TRAINING PROGRAM

## 2024-05-27 PROCEDURE — 85027 COMPLETE CBC AUTOMATED: CPT

## 2024-05-27 PROCEDURE — 83036 HEMOGLOBIN GLYCOSYLATED A1C: CPT

## 2024-05-27 PROCEDURE — 82962 GLUCOSE BLOOD TEST: CPT

## 2024-05-27 PROCEDURE — 6370000000 HC RX 637 (ALT 250 FOR IP): Performed by: PSYCHIATRY & NEUROLOGY

## 2024-05-27 RX ORDER — POTASSIUM CHLORIDE 20 MEQ/1
20 TABLET, EXTENDED RELEASE ORAL ONCE
Status: COMPLETED | OUTPATIENT
Start: 2024-05-27 | End: 2024-05-27

## 2024-05-27 RX ORDER — ASPIRIN 81 MG/1
81 TABLET, CHEWABLE ORAL DAILY
Status: DISCONTINUED | OUTPATIENT
Start: 2024-05-27 | End: 2024-05-27 | Stop reason: HOSPADM

## 2024-05-27 RX ORDER — ATORVASTATIN CALCIUM 20 MG/1
20 TABLET, FILM COATED ORAL NIGHTLY
Qty: 30 TABLET | Refills: 3 | Status: SHIPPED | OUTPATIENT
Start: 2024-05-27

## 2024-05-27 RX ORDER — ASPIRIN 81 MG/1
81 TABLET, CHEWABLE ORAL DAILY
Qty: 30 TABLET | Refills: 3 | Status: SHIPPED | OUTPATIENT
Start: 2024-05-27

## 2024-05-27 RX ADMIN — POTASSIUM CHLORIDE 20 MEQ: 1500 TABLET, EXTENDED RELEASE ORAL at 10:15

## 2024-05-27 RX ADMIN — ASPIRIN 81 MG: 81 TABLET, CHEWABLE ORAL at 12:25

## 2024-05-27 RX ADMIN — SODIUM CHLORIDE, PRESERVATIVE FREE 10 ML: 5 INJECTION INTRAVENOUS at 09:15

## 2024-05-27 RX ADMIN — LEVOTHYROXINE SODIUM 37.5 MCG: 0.07 TABLET ORAL at 06:13

## 2024-05-27 RX ADMIN — INSULIN LISPRO 2 UNITS: 100 INJECTION, SOLUTION INTRAVENOUS; SUBCUTANEOUS at 12:25

## 2024-05-27 RX ADMIN — INSULIN LISPRO 2 UNITS: 100 INJECTION, SOLUTION INTRAVENOUS; SUBCUTANEOUS at 08:20

## 2024-05-27 NOTE — CARE COORDINATION
1224 - CM noted d/c order. Plan for home, family to transport. Contacted pt and confirmed. Noted need for outpatient PT neuro script, CM sent message to MD via [employer] for outpatient script. CM to complete full assessment if able, otherwise pt is clear for d/c from CM.     Stacey Orlando, MSW  Care Management  [facility]  [phone]

## 2024-05-27 NOTE — PROGRESS NOTES
0705: Bedside shift change report given to MAYTE Lyons (oncoming nurse) by MAYTE Tse (offgoing nurse). Report included the following information Nurse Handoff Report.      1237: Discharge order placed, pt prepared for discharge from facility. IV access lines and telemetry monitor removed. Discharge instructions reviewed with patient, patient verbalizes understanding and written instructions given to patient. Pt educated on the importance of diabetes management and following up with PCP - instructed on signs of DKA, hemoglobin A1C level. Pt states that she has an appointment with PCP 5/28. Discharging MD aware.    
0730: Bedside shift change report given to MAYTE Lyons (oncoming nurse) by MAYTE Tse (offgoing nurse). Report included the following information Nurse Handoff Report.     1054: Pt off floor for CT scan, pt put on portable telemetry box # [id number], telemetry aware.    1134: Pt returned to room 2309 from CT.    End of Shift Note    Bedside shift change report given to MAYTE Tse (oncoming nurse) by Elham Parker RN (offgoing nurse).  Report included the following information SBAR, MAR, Recent Results, and Cardiac Rhythm NSR    Shift worked:  7a-7p     Shift summary and any significant changes:    Pt rested in between bed and chair for majority of shift. Repeat head CT performed, no changes noted from previous CT. Echo and doppler study still pending. Insulin correction given for all 3 meals, A1C to be drawn with am labs. Pt got up and walked with PT/OT - recommended outpatient PT if needed.      Concerns for physician to address:       Zone phone for oncoming shift:          Activity:     Number times ambulated in hallways past shift: 1  Number of times OOB to chair past shift: 2    Cardiac:   Cardiac Monitoring: Yes           Access:  Current line(s): PIV     Genitourinary:   Urinary status: voiding    Respiratory:      Chronic home O2 use?: NO  Incentive spirometer at bedside: NO       GI:     Current diet:  ADULT DIET; Regular; 4 carb choices (60 gm/meal); Low Fat/Low Chol/High Fiber/DORA  Passing flatus: YES  Tolerating current diet: YES       Pain Management:   Patient states pain is manageable on current regimen: YES    Skin:     Interventions: increase time out of bed, PT/OT consult, and nutritional support    Patient Safety:  Fall Score:    Interventions: bed/chair alarm, gripper socks, and pt to call before getting OOB       Length of Stay:  Expected LOS: 2  Actual LOS: 0      Elham Parker RN                            
4 Eyes Skin Assessment     NAME:  Diana Hercules  YOB: 1960  MEDICAL RECORD NUMBER:  [id number]    The patient is being assessed for  Admission    I agree that at least one RN has performed a thorough Head to Toe Skin Assessment on the patient. ALL assessment sites listed below have been assessed.      Areas assessed by both nurses:    Head, Face, Ears, Shoulders, Back, Chest, Arms, Elbows, Hands, Sacrum. Buttock, Coccyx, Ischium, Legs. Feet and Heels, and Under Medical Devices         Does the Patient have a Wound? No noted wound(s)       Liam Prevention initiated by RN: No  Wound Care Orders initiated by RN: No    Pressure Injury (Stage 3,4, Unstageable, DTI, NWPT, and Complex wounds) if present, place Wound referral order by RN under : No    New Ostomies, if present place, Ostomy referral order under : No     Nurse 1 eSignature: Electronically signed by Carmencita Montana RN on 5/26/24 at 7:38 AM EDT    **SHARE this note so that the co-signing nurse can place an eSignature**    Nurse 2 eSignature: Electronically signed by Alem Mayers RN on 5/26/24 at 7:40 AM EDT   
End of Shift Note    Bedside shift change report given to Elham HU (oncoming nurse) by Carmencita Montana RN (offgoing nurse).  Report included the following information SBAR and Cardiac Rhythm NSR     Shift worked:  330am-7am   Shift summary and any significant changes:     Numbs and tingling resolved, NIHSS 0     Concerns for physician to address:       Zone phone for oncoming shift:          Activity:     Number times ambulated in hallways past shift: 0  Number of times OOB to chair past shift: 0    Cardiac:   Cardiac Monitoring: Yes           Access:  Current line(s): PIV     Genitourinary:   Urinary status: voiding    Respiratory:      Chronic home O2 use?: NO  Incentive spirometer at bedside: N/A       GI:     Current diet:  ADULT DIET; Regular; 4 carb choices (60 gm/meal); Low Fat/Low Chol/High Fiber/DORA  Passing flatus: YES  Tolerating current diet: YES       Pain Management:   Patient states pain is manageable on current regimen: YES    Skin:     Interventions: limit briefs    Patient Safety:  Fall Score:    Interventions: bed/chair alarm and pt to call before getting OOB       Length of Stay:  Expected LOS: 2  Actual LOS: 0      Carmencita Montana RN                            
End of Shift Note    Bedside shift change report given to Elham HU (oncoming nurse) by Carmencita Montana RN (offgoing nurse).  Report included the following information SBAR and Cardiac Rhythm NSR    Shift worked:  7P-7A     Shift summary and any significant changes:     NIHSS remain 0      Concerns for physician to address:  Discharge planning      Zone phone for oncoming shift:          Activity:     Number times ambulated in hallways past shift: 0  Number of times OOB to chair past shift: 0    Cardiac:   Cardiac Monitoring: Yes           Access:  Current line(s): PIV     Genitourinary:   Urinary status: voiding    Respiratory:      Chronic home O2 use?: NO  Incentive spirometer at bedside: N/A       GI:     Current diet:  ADULT DIET; Regular; 4 carb choices (60 gm/meal); Low Fat/Low Chol/High Fiber/DORA  Passing flatus: YES  Tolerating current diet: YES       Pain Management:   Patient states pain is manageable on current regimen: N/A    Skin:     Interventions: PT/OT consult and limit briefs    Patient Safety:  Fall Score:    Interventions: pt to call before getting OOB       Length of Stay:  Expected LOS: 2  Actual LOS: 1      Carmencita Montana RN                            
Initially called about this pt when it was unclear if she had had a stroke  MRI confirmed stroke, minimal hemorrhage into it, no neurosurgical intervention necessary  Regarding the brainstem epidermoid that is incidental, given the recent stroke she would not be a good candidate for surgical intervention but it is not necessary to consider surgery at this time  
Patient seen and examined at bedside.   Labs , charts and imaging were reviewed.   Consult notes appreciated.   Admitted for Acute stroke.    Repeat Ct head ,echo and doppler study pending.   Lipid panel, HbA1c ordered.  Neurosurgery rec: Regarding the brainstem epidermoid that is incidental, given the recent stroke she would not be a good candidate for surgical intervention but it is not necessary to consider surgery at this time       Agree with rest of the management plan as per admitting team.   
decreased, functional    Tone & Sensation:   Tone: Normal  Sensation: Impaired (L hand altered sensation, B LE intact)    Coordination:  Coordination: Generally decreased, functional    Range Of Motion:  AROM: Generally decreased, functional       Functional Mobility:  Bed Mobility:     Bed Mobility Training  Bed Mobility Training: Yes  Overall Level of Assistance: Independent  Transfers:     Transfer Training  Transfer Training: Yes  Sit to Stand: Supervision  Stand to Sit: Supervision  Bed to Chair: Supervision  Balance:               Balance  Sitting: Intact  Standing: Impaired  Standing - Static: Good  Standing - Dynamic: Good  Ambulation/Gait Training:                       Gait  Gait Training: Yes  Overall Level of Assistance: Supervision  Distance (ft): 200 Feet  Assistive Device: Gait belt  Interventions: Safety awareness training;Verbal cues  Speed/Senait: Slow  Step Length: Left shortened;Right shortened  Gait Abnormalities: Path deviations;Decreased step clearance                                                                                                                                                                                                                                                      Intervention/Education specific to: \"Stroke diagnoses\"    Patient was educated regarding her deficit(s) of L sided sensory changes as this relates to her diagnosis of acute CVA.  She demonstrated good  understanding as evidenced by asking questions/verbal confirmation.    Patient and/or family was verbally and visually educated on the BE FAST acronym for signs/symptoms of CVA and TIA.  All questions answered with patient indicating good  understanding.     Gleason Balance Test:    Gleason Balance Scale  1. Sitting to Standing: Able to stand without using hands and stabilize independently  2. Standing Unsupported: Able to stand safely for 2 minutes  3. Sitting with Back Unsupported but Feet Supported on Floor 
Oral Nightly       Review of Systems:    General, constitutional: negative  Eyes, vision: negative  Ears, nose, throat: negative  Cardiovascular, heart: negative  Respiratory: negative  Gastrointestinal: negative  Genitourinary: negative  Musculoskeletal: negative  Skin and integumentary: negative  Psychiatric: negative  Endocrine: negative  Neurological: negative, except for HPI  Hematologic/lymphatic: negative  Allergy/immunology: negative      Objective:     BP (!) 125/92   Pulse 76   Temp 98 °F (36.7 °C) (Oral)   Resp 17   Ht 1.575 m (5' 2\")   Wt 67.5 kg (148 lb 14.4 oz)   SpO2 96%   BMI 27.23 kg/m²     Physical Exam:    General:  appears well nourished in no acute distress  Neck: no carotid bruits  Lungs: clear to auscultation  Heart:  no murmurs, regular rate  Lower extremity: peripheral pulses palpable and no edema  Skin: intact    Neurological exam:    Awake, alert, oriented to person, place and time  Recent and remote memory were normal  Attention and concentration were intact  Language was intact.  There was no aphasia  Speech: no dysarthria  Fund of knowledge was preserved    Cranial nerves:   II-XII were tested    Perrrla  Visual fields were full  Eomi, no evidence of nystagmus  Facial sensation:  normal and symmetric  Facial motor: mild facial asymmetry  Hearing intact  SCM strength intact  Tongue: midline without fasciculations    Motor:   Tone normal  Pronator drift was subtle in the left upper extremity  No evidence of fasciculations    Strength testing:   deltoid triceps biceps Wrist ext. Wrist flex. intrinsics Hip flex. Hip ext. Knee ext.  Knee flex Dorsi flex Plantar flex   Right 5 5 5 5 5 5 5 5 5 5 5 5   Left 5 5 5 5 5 5 5 5 5 5 5 5         Sensory:  Upper extremity: intact to pp and vibration  Lower extremity: intact to pp, vibration was 5 seconds at ankles    Reflexes:    Right Left  Biceps  2 2  Triceps 2 2  Brachiorad. 2 2  Patella  2 2  Achilles 1 1    Plantar response:  flexor 
plan of care was discussed with: physical therapist and registered nurse    Patient Education  Education Given To: Patient  Education Provided: Role of Therapy;Plan of Care;Home Exercise Program;ADL Adaptive Strategies;Transfer Training  Education Method: Verbal;Teach Back  Barriers to Learning: None  Education Outcome: Verbalized understanding    Thank you for this referral.  Jeane Hancock OTR/L  Minutes: 38    Occupational Therapy Evaluation Charge Determination   History Examination Decision-Making   MEDIUM Complexity : Expanded review of history including physical, cognitive and psychocial history  HIGH Complexity: 5 Performance deficits relating to physical, cognitive, or psychosocial skills that result in activity limitations and/or participation restrictions  MEDIUM Complexity: Patient may present with comorbidities that affect occupational performance. Minimal to moderate modifications of tasks or assist (eg. physical or verbal) with assist is necessary to enable pt to complete eval   Based on the above components, the patient evaluation is determined to be of the following complexity level: Medium

## 2024-05-27 NOTE — TELEPHONE ENCOUNTER
Please schedule patient for a hospital follow up appointment in clinic    Neurology provider: any provider      In person or virtual:     When: 4 weeks    Diagnosis/reason for follow up:  acute stroke    Thanks!

## 2024-05-27 NOTE — PLAN OF CARE
Problem: Discharge Planning  Goal: Discharge to home or other facility with appropriate resources  5/27/2024 1209 by Elham Parker RN  Outcome: Adequate for Discharge  5/27/2024 1208 by Elham Parker RN  Outcome: Progressing     Problem: Pain  Goal: Verbalizes/displays adequate comfort level or baseline comfort level  5/27/2024 1209 by Elham Parker RN  Outcome: Adequate for Discharge  5/27/2024 1208 by Elham Parker RN  Outcome: Progressing     Problem: Safety - Adult  Goal: Free from fall injury  5/27/2024 1209 by Elham Parker RN  Outcome: Adequate for Discharge  5/27/2024 1208 by Elham Parker RN  Outcome: Progressing

## 2024-05-27 NOTE — DISCHARGE SUMMARY
Discharge Summary    Name: Diana Hercules  [id number]  YOB: 1960 (Age: 63 y.o.)   Date of Admission: 5/25/2024  Date of Discharge: 5/27/2024  Attending Physician: Lisa Santo MD    Discharge Diagnosis:   Acute stroke -an ischemic stroke with mild hemorrhagic transformation   Incidental finding of a pontine/perimesencephalic epidermoid   Hypothyroidism   Hypertension  Hypertensive cardiomyopathy  Hyperlipidemia  Mitral valve prolapse  Diabetes with AkJ4g-15.2   Hypercalcemia resolved      Consultations:  IP CONSULT TO NEUROLOGY  IP CONSULT TO NEUROSURGERY      Brief Admission History/Reason for Admission Per Jluis Chávez, DO:   Diana Hercules is a 63 y.o.  female with PMHx as listed below presenting to the emergency department with complaints of transient numbness/tingling of left side of her body starting around 11 AM not associated with weakness, headache, vision changes, or other significant neurologic deficit.  Denies history of CVA.  Reports prediabetes and high blood pressure.  Not on antiplatelet or anticoagulation agent.  Reports symptoms have completely resolved at this time.  ROS otherwise negative.  Denies tobacco or illicit drugs.  Reports occasional wine consumption.     In the ED, patient afebrile with heart rates 90s, blood pressures 90s/70s, saturating mid 90s on room air.  ECG demonstrates sinus tachycardia without definitive ischemic change.  CT head demonstrating area of hypodensity in right posterior frontal lobe and parietal lobe with recommendations for MRI.  MRI brain performed demonstrating moderate-sized right frontal parietal infarct with mild superimposed hemorrhage without associated midline shift or mass effect with incidental pontine/perimesencephalic epidermoid measuring 39 x 16 mm with mild right pontine effacement noted.  Labs demonstrate: Unremarkable CBC,-troponin 14, INR 1.0, sodium 134, potassium 3.8, glucose 335, BUN 22,

## 2024-05-27 NOTE — DISCHARGE INSTRUCTIONS
HOSPITALIST DISCHARGE INSTRUCTIONS    NAME: Diana Hercules   :  1960   MRN:  [id number]     Date/Time:  2024 11:59 AM    ADMIT DATE: 2024     DISCHARGE DATE: 2024     DISCHARGE DIAGNOSIS:  Acute stroke -an ischemic stroke with mild hemorrhagic transformation   Incidental finding of a pontine/perimesencephalic epidermoid   Hypothyroidism   Hypertension  Hypertensive cardiomyopathy  Hyperlipidemia  Mitral valve prolapse  Diabetes with SvS7d-44.2   Hypercalcemia resolved    MEDICATIONS:  As per medication reconciliation  list  It is important that you take the medication exactly as they are prescribed.   Keep your medication in the bottles provided by the pharmacist and keep a list of the medication names, dosages, and times to be taken in your wallet.   Do not take other medications without consulting your doctor.     Pain Management: per above medications    What to do at Home:  Monitor your Blood glucose   Needs to follow up with PCP for  echo, US doppler carotid  and DM medications adjustment     Recommended diet:  diabetic diet    Recommended activity: activity as tolerated    If you have questions regarding the hospital related prescriptions or hospital related issues please call at .    If you experience any of the following symptoms then please call your primary care physician or return to the emergency room if you cannot get hold of your doctor:  Fever, chills, nausea, vomiting, diarrhea, change in mentation, falling, bleeding, shortness of breath    Follow Up:   @PCP@  you are to call and set up an appointment to see them in 7-10 days.      Information obtained by :  I understand that if any problems occur once I am at home I am to contact my physician.    I understand and acknowledge receipt of the instructions indicated above.

## 2024-05-28 ENCOUNTER — APPOINTMENT (OUTPATIENT)
Facility: HOSPITAL | Age: 64
DRG: 064 | End: 2024-05-28
Payer: COMMERCIAL

## 2024-05-28 ENCOUNTER — TELEPHONE (OUTPATIENT)
Age: 64
End: 2024-05-28

## 2024-05-28 ENCOUNTER — HOSPITAL ENCOUNTER (INPATIENT)
Facility: HOSPITAL | Age: 64
LOS: 3 days | Discharge: HOME | DRG: 064 | End: 2024-05-31
Attending: STUDENT IN AN ORGANIZED HEALTH CARE EDUCATION/TRAINING PROGRAM | Admitting: STUDENT IN AN ORGANIZED HEALTH CARE EDUCATION/TRAINING PROGRAM
Payer: COMMERCIAL

## 2024-05-28 DIAGNOSIS — E11.65 TYPE 2 DIABETES MELLITUS WITH HYPERGLYCEMIA (HCC): ICD-10-CM

## 2024-05-28 DIAGNOSIS — I63.311 CEREBROVASCULAR ACCIDENT (CVA) DUE TO THROMBOSIS OF RIGHT MIDDLE CEREBRAL ARTERY (HCC): Primary | ICD-10-CM

## 2024-05-28 DIAGNOSIS — R56.9 PARTIAL SEIZURE (HCC): ICD-10-CM

## 2024-05-28 PROBLEM — G40.919 BREAKTHROUGH SEIZURE (HCC): Status: ACTIVE | Noted: 2024-05-28

## 2024-05-28 LAB
ALBUMIN SERPL-MCNC: 3.5 G/DL (ref 3.5–5)
ALBUMIN/GLOB SERPL: 0.7 (ref 1.1–2.2)
ALP SERPL-CCNC: 98 U/L (ref 45–117)
ALT SERPL-CCNC: 28 U/L (ref 12–78)
ANION GAP SERPL CALC-SCNC: 9 MMOL/L (ref 5–15)
AST SERPL-CCNC: 18 U/L (ref 15–37)
BASOPHILS # BLD: 0.1 K/UL (ref 0–0.1)
BASOPHILS NFR BLD: 1 % (ref 0–1)
BILIRUB SERPL-MCNC: 0.7 MG/DL (ref 0.2–1)
BUN SERPL-MCNC: 21 MG/DL (ref 6–20)
BUN/CREAT SERPL: 23 (ref 12–20)
CALCIUM SERPL-MCNC: 10.3 MG/DL (ref 8.5–10.1)
CHLORIDE SERPL-SCNC: 107 MMOL/L (ref 97–108)
CO2 SERPL-SCNC: 19 MMOL/L (ref 21–32)
CREAT SERPL-MCNC: 0.91 MG/DL (ref 0.55–1.02)
DIFFERENTIAL METHOD BLD: ABNORMAL
EOSINOPHIL # BLD: 0 K/UL (ref 0–0.4)
EOSINOPHIL NFR BLD: 0 % (ref 0–7)
ERYTHROCYTE [DISTWIDTH] IN BLOOD BY AUTOMATED COUNT: 12.1 % (ref 11.5–14.5)
GLOBULIN SER CALC-MCNC: 5.1 G/DL (ref 2–4)
GLUCOSE BLD STRIP.AUTO-MCNC: 184 MG/DL (ref 65–117)
GLUCOSE BLD STRIP.AUTO-MCNC: 207 MG/DL (ref 65–117)
GLUCOSE SERPL-MCNC: 315 MG/DL (ref 65–100)
HCT VFR BLD AUTO: 39.2 % (ref 35–47)
HGB BLD-MCNC: 14 G/DL (ref 11.5–16)
IMM GRANULOCYTES # BLD AUTO: 0.1 K/UL (ref 0–0.04)
IMM GRANULOCYTES NFR BLD AUTO: 1 % (ref 0–0.5)
INR PPP: 1 (ref 0.9–1.1)
LYMPHOCYTES # BLD: 2 K/UL (ref 0.8–3.5)
LYMPHOCYTES NFR BLD: 18 % (ref 12–49)
MCH RBC QN AUTO: 28.6 PG (ref 26–34)
MCHC RBC AUTO-ENTMCNC: 35.7 G/DL (ref 30–36.5)
MCV RBC AUTO: 80 FL (ref 80–99)
MONOCYTES # BLD: 0.6 K/UL (ref 0–1)
MONOCYTES NFR BLD: 5 % (ref 5–13)
NEUTS SEG # BLD: 8.5 K/UL (ref 1.8–8)
NEUTS SEG NFR BLD: 75 % (ref 32–75)
NRBC # BLD: 0 K/UL (ref 0–0.01)
NRBC BLD-RTO: 0 PER 100 WBC
PLATELET # BLD AUTO: 277 K/UL (ref 150–400)
PMV BLD AUTO: 11.8 FL (ref 8.9–12.9)
POTASSIUM SERPL-SCNC: 4 MMOL/L (ref 3.5–5.1)
PROT SERPL-MCNC: 8.6 G/DL (ref 6.4–8.2)
PROTHROMBIN TIME: 10.7 SEC (ref 9–11.1)
RBC # BLD AUTO: 4.9 M/UL (ref 3.8–5.2)
SERVICE CMNT-IMP: ABNORMAL
SERVICE CMNT-IMP: ABNORMAL
SODIUM SERPL-SCNC: 135 MMOL/L (ref 136–145)
TROPONIN I SERPL HS-MCNC: 10 NG/L (ref 0–51)
WBC # BLD AUTO: 11.3 K/UL (ref 3.6–11)

## 2024-05-28 PROCEDURE — 95816 EEG AWAKE AND DROWSY: CPT | Performed by: PSYCHIATRY & NEUROLOGY

## 2024-05-28 PROCEDURE — 2060000000 HC ICU INTERMEDIATE R&B

## 2024-05-28 PROCEDURE — 70498 CT ANGIOGRAPHY NECK: CPT

## 2024-05-28 PROCEDURE — 2580000003 HC RX 258: Performed by: STUDENT IN AN ORGANIZED HEALTH CARE EDUCATION/TRAINING PROGRAM

## 2024-05-28 PROCEDURE — 6360000004 HC RX CONTRAST MEDICATION: Performed by: STUDENT IN AN ORGANIZED HEALTH CARE EDUCATION/TRAINING PROGRAM

## 2024-05-28 PROCEDURE — 6360000002 HC RX W HCPCS: Performed by: STUDENT IN AN ORGANIZED HEALTH CARE EDUCATION/TRAINING PROGRAM

## 2024-05-28 PROCEDURE — 85025 COMPLETE CBC W/AUTO DIFF WBC: CPT

## 2024-05-28 PROCEDURE — 96374 THER/PROPH/DIAG INJ IV PUSH: CPT

## 2024-05-28 PROCEDURE — 96375 TX/PRO/DX INJ NEW DRUG ADDON: CPT

## 2024-05-28 PROCEDURE — 70551 MRI BRAIN STEM W/O DYE: CPT

## 2024-05-28 PROCEDURE — 6370000000 HC RX 637 (ALT 250 FOR IP): Performed by: STUDENT IN AN ORGANIZED HEALTH CARE EDUCATION/TRAINING PROGRAM

## 2024-05-28 PROCEDURE — 82962 GLUCOSE BLOOD TEST: CPT

## 2024-05-28 PROCEDURE — 36415 COLL VENOUS BLD VENIPUNCTURE: CPT

## 2024-05-28 PROCEDURE — 84484 ASSAY OF TROPONIN QUANT: CPT

## 2024-05-28 PROCEDURE — 99223 1ST HOSP IP/OBS HIGH 75: CPT | Performed by: PSYCHIATRY & NEUROLOGY

## 2024-05-28 PROCEDURE — 95819 EEG AWAKE AND ASLEEP: CPT

## 2024-05-28 PROCEDURE — 70450 CT HEAD/BRAIN W/O DYE: CPT

## 2024-05-28 PROCEDURE — 6370000000 HC RX 637 (ALT 250 FOR IP): Performed by: PSYCHIATRY & NEUROLOGY

## 2024-05-28 PROCEDURE — 80053 COMPREHEN METABOLIC PANEL: CPT

## 2024-05-28 PROCEDURE — 0042T CT BRAIN PERFUSION: CPT

## 2024-05-28 PROCEDURE — 99285 EMERGENCY DEPT VISIT HI MDM: CPT

## 2024-05-28 PROCEDURE — 4A03X5D MEASUREMENT OF ARTERIAL FLOW, INTRACRANIAL, EXTERNAL APPROACH: ICD-10-PCS | Performed by: STUDENT IN AN ORGANIZED HEALTH CARE EDUCATION/TRAINING PROGRAM

## 2024-05-28 PROCEDURE — 85610 PROTHROMBIN TIME: CPT

## 2024-05-28 PROCEDURE — 93005 ELECTROCARDIOGRAM TRACING: CPT

## 2024-05-28 RX ORDER — LABETALOL HYDROCHLORIDE 5 MG/ML
10 INJECTION INTRAVENOUS EVERY 6 HOURS PRN
Status: DISCONTINUED | OUTPATIENT
Start: 2024-05-28 | End: 2024-05-31 | Stop reason: HOSPADM

## 2024-05-28 RX ORDER — ATORVASTATIN CALCIUM 20 MG/1
20 TABLET, FILM COATED ORAL NIGHTLY
Status: DISCONTINUED | OUTPATIENT
Start: 2024-05-28 | End: 2024-05-31 | Stop reason: HOSPADM

## 2024-05-28 RX ORDER — SODIUM CHLORIDE 9 MG/ML
INJECTION, SOLUTION INTRAVENOUS PRN
Status: DISCONTINUED | OUTPATIENT
Start: 2024-05-28 | End: 2024-05-31 | Stop reason: HOSPADM

## 2024-05-28 RX ORDER — INSULIN LISPRO 100 [IU]/ML
0-4 INJECTION, SOLUTION INTRAVENOUS; SUBCUTANEOUS
Status: DISCONTINUED | OUTPATIENT
Start: 2024-05-28 | End: 2024-05-31 | Stop reason: HOSPADM

## 2024-05-28 RX ORDER — ASPIRIN 300 MG/1
300 SUPPOSITORY RECTAL DAILY
Status: DISCONTINUED | OUTPATIENT
Start: 2024-05-28 | End: 2024-05-30

## 2024-05-28 RX ORDER — INSULIN LISPRO 100 [IU]/ML
3 INJECTION, SOLUTION INTRAVENOUS; SUBCUTANEOUS
Status: DISCONTINUED | OUTPATIENT
Start: 2024-05-28 | End: 2024-05-30

## 2024-05-28 RX ORDER — SPIRONOLACTONE 25 MG/1
50 TABLET ORAL 2 TIMES DAILY
Status: DISCONTINUED | OUTPATIENT
Start: 2024-05-28 | End: 2024-05-31 | Stop reason: HOSPADM

## 2024-05-28 RX ORDER — 0.9 % SODIUM CHLORIDE 0.9 %
1000 INTRAVENOUS SOLUTION INTRAVENOUS ONCE
Status: COMPLETED | OUTPATIENT
Start: 2024-05-28 | End: 2024-05-28

## 2024-05-28 RX ORDER — LEVETIRACETAM 500 MG/5ML
2000 INJECTION, SOLUTION, CONCENTRATE INTRAVENOUS ONCE
Status: COMPLETED | OUTPATIENT
Start: 2024-05-28 | End: 2024-05-28

## 2024-05-28 RX ORDER — LEVETIRACETAM 500 MG/1
500 TABLET ORAL 2 TIMES DAILY
Status: DISCONTINUED | OUTPATIENT
Start: 2024-05-28 | End: 2024-05-31 | Stop reason: HOSPADM

## 2024-05-28 RX ORDER — ENOXAPARIN SODIUM 100 MG/ML
40 INJECTION SUBCUTANEOUS DAILY
Status: DISCONTINUED | OUTPATIENT
Start: 2024-05-28 | End: 2024-05-28

## 2024-05-28 RX ORDER — LORAZEPAM 2 MG/ML
1 INJECTION INTRAMUSCULAR ONCE
Status: COMPLETED | OUTPATIENT
Start: 2024-05-28 | End: 2024-05-28

## 2024-05-28 RX ORDER — LORAZEPAM 2 MG/ML
2 INJECTION INTRAMUSCULAR ONCE
Status: DISCONTINUED | OUTPATIENT
Start: 2024-05-28 | End: 2024-05-28

## 2024-05-28 RX ORDER — INSULIN GLARGINE 100 [IU]/ML
10 INJECTION, SOLUTION SUBCUTANEOUS NIGHTLY
Status: DISCONTINUED | OUTPATIENT
Start: 2024-05-28 | End: 2024-05-29

## 2024-05-28 RX ORDER — LABETALOL HYDROCHLORIDE 5 MG/ML
INJECTION INTRAVENOUS
Status: DISCONTINUED
Start: 2024-05-28 | End: 2024-05-28 | Stop reason: WASHOUT

## 2024-05-28 RX ORDER — DEXTROSE MONOHYDRATE 100 MG/ML
INJECTION, SOLUTION INTRAVENOUS CONTINUOUS PRN
Status: DISCONTINUED | OUTPATIENT
Start: 2024-05-28 | End: 2024-05-31 | Stop reason: HOSPADM

## 2024-05-28 RX ORDER — GLUCAGON 1 MG/ML
1 KIT INJECTION PRN
Status: DISCONTINUED | OUTPATIENT
Start: 2024-05-28 | End: 2024-05-31 | Stop reason: HOSPADM

## 2024-05-28 RX ORDER — ONDANSETRON 4 MG/1
4 TABLET, ORALLY DISINTEGRATING ORAL EVERY 8 HOURS PRN
Status: DISCONTINUED | OUTPATIENT
Start: 2024-05-28 | End: 2024-05-31 | Stop reason: HOSPADM

## 2024-05-28 RX ORDER — INSULIN LISPRO 100 [IU]/ML
0-4 INJECTION, SOLUTION INTRAVENOUS; SUBCUTANEOUS NIGHTLY
Status: DISCONTINUED | OUTPATIENT
Start: 2024-05-28 | End: 2024-05-31 | Stop reason: HOSPADM

## 2024-05-28 RX ORDER — LEVETIRACETAM 500 MG/5ML
1000 INJECTION, SOLUTION, CONCENTRATE INTRAVENOUS ONCE
Status: DISCONTINUED | OUTPATIENT
Start: 2024-05-28 | End: 2024-05-28

## 2024-05-28 RX ORDER — SODIUM CHLORIDE 0.9 % (FLUSH) 0.9 %
5-40 SYRINGE (ML) INJECTION PRN
Status: DISCONTINUED | OUTPATIENT
Start: 2024-05-28 | End: 2024-05-31 | Stop reason: HOSPADM

## 2024-05-28 RX ORDER — ONDANSETRON 2 MG/ML
4 INJECTION INTRAMUSCULAR; INTRAVENOUS EVERY 6 HOURS PRN
Status: DISCONTINUED | OUTPATIENT
Start: 2024-05-28 | End: 2024-05-31 | Stop reason: HOSPADM

## 2024-05-28 RX ORDER — ASPIRIN 81 MG/1
81 TABLET, CHEWABLE ORAL DAILY
Status: DISCONTINUED | OUTPATIENT
Start: 2024-05-28 | End: 2024-05-31 | Stop reason: HOSPADM

## 2024-05-28 RX ORDER — SODIUM CHLORIDE 0.9 % (FLUSH) 0.9 %
5-40 SYRINGE (ML) INJECTION EVERY 12 HOURS SCHEDULED
Status: DISCONTINUED | OUTPATIENT
Start: 2024-05-28 | End: 2024-05-31 | Stop reason: HOSPADM

## 2024-05-28 RX ORDER — LEVOTHYROXINE SODIUM 0.03 MG/1
25 TABLET ORAL DAILY
Status: DISCONTINUED | OUTPATIENT
Start: 2024-05-28 | End: 2024-05-31 | Stop reason: HOSPADM

## 2024-05-28 RX ORDER — POLYETHYLENE GLYCOL 3350 17 G/17G
17 POWDER, FOR SOLUTION ORAL DAILY PRN
Status: DISCONTINUED | OUTPATIENT
Start: 2024-05-28 | End: 2024-05-31 | Stop reason: HOSPADM

## 2024-05-28 RX ADMIN — LEVETIRACETAM 500 MG: 500 TABLET, FILM COATED ORAL at 21:42

## 2024-05-28 RX ADMIN — LEVETIRACETAM 2000 MG: 100 INJECTION INTRAVENOUS at 13:39

## 2024-05-28 RX ADMIN — LORAZEPAM 1 MG: 2 INJECTION INTRAMUSCULAR; INTRAVENOUS at 13:39

## 2024-05-28 RX ADMIN — SODIUM CHLORIDE, PRESERVATIVE FREE 10 ML: 5 INJECTION INTRAVENOUS at 21:42

## 2024-05-28 RX ADMIN — SODIUM CHLORIDE 1000 ML: 9 INJECTION, SOLUTION INTRAVENOUS at 13:39

## 2024-05-28 RX ADMIN — IOPAMIDOL 100 ML: 755 INJECTION, SOLUTION INTRAVENOUS at 13:25

## 2024-05-28 RX ADMIN — SPIRONOLACTONE 50 MG: 25 TABLET ORAL at 21:42

## 2024-05-28 RX ADMIN — ATORVASTATIN CALCIUM 20 MG: 20 TABLET, FILM COATED ORAL at 21:42

## 2024-05-28 RX ADMIN — INSULIN GLARGINE 10 UNITS: 100 INJECTION, SOLUTION SUBCUTANEOUS at 21:41

## 2024-05-28 ASSESSMENT — PAIN - FUNCTIONAL ASSESSMENT: PAIN_FUNCTIONAL_ASSESSMENT: NONE - DENIES PAIN

## 2024-05-28 NOTE — H&P
Hospitalist Admission Note    NAME:   Diana Hercules   : 1960   MRN: [id number]     Date/Time: 2024 4:07 PM    Patient PCP: Chucho Barrientos MD    ______________________________________________________________________  Given the patient's current clinical presentation, I have a high level of concern for decompensation if discharged from the emergency department.  Complex decision making was performed, which includes reviewing the patient's available past medical records, laboratory results, and x-ray films.       My assessment of this patient's clinical condition and my plan of care is as follows.    Assessment / Plan:  Possible extension of prior stroke vs new onset seizure with postictal state   Worsening of the left upper and lower extremity weakness and sensory symptoms   Involuntary convulsions of the left upper extremity   Recent right MCA territory stroke  with mild petechial hemorrhage on   CTA with no large vessel occlusion   MRI on : an acute stroke in the right frontal parietal region with mild superimposed hemorrhage. There was also a pontine/perimesencephalic epidermoid       Plan:  Admit to medical stepdown unit with tele   EEG -negative for Seizure activity   MRI brain, echo , US carotid doppler pending   Continue with Aspirin ,statin   Continue with Keppra 500mg bid  Close neuro monitoring   PT/OT eval   Appreciated neuro recs       Acute grief   -Patient found her son  this morning  -Continue with emotional support       DM with HbA1c 11.2   -Hold Metformin   -Started on Insulin glargine and lispro  -Correction insulin   -Hypoglycemia protocol     HTN   Hypertensive cardiomyopathy   Mitral valve prolapse   BP goal <140   Continue with aldactone   Labetalol prn to maintain BP   Sinus tachycardia -continue to monitor     Hypothyroidism  -Continue with Levothyroxine           Medical Decision Making:   I personally reviewed labs: Yes   I personally reviewed imaging:Yes  ventricles are normal in size, position and configuration.  There are no extra-axial fluid collections. Major intracranial vascular flow-voids are unremarkable. The orbits are grossly symmetric. Dural venous sinuses are grossly unremarkable. There is no Chiari or syrinx. Pituitary and infundibulum grossly unremarkable. Cerebellopontine angles are unremarkable. No skull base mass is identified. Cavernous sinuses are symmetric. The mastoid air cells and are well pneumatized and clear.     Moderate sized right frontal parietal infarct with mild superimposed hemorrhage, no associated midline shift or mass effect. Pontine/perimesencephalic epidermoid measuring approximately 39 x 16 mm in size, mild rightward effacement of the william is associated. Outpatient neurosurgical consultation recommended.     CT HEAD WO CONTRAST    Result Date: 5/25/2024  EXAM: CT HEAD WO CONTRAST CLINICAL HISTORY: Altered mental status INDICATION: Altered mental status COMPARISON: None. CT dose reduction was achieved through use of a standardized protocol tailored for this examination and automatic exposure control for dose modulation. TECHNIQUE: Serial axial images with a collimation of 5 mm were obtained from the skull base through the vertex  FINDINGS: Hypodensity at the superior right posterior frontal lobe and right parietal lobe. Trace hemorrhage may be superimposed. No associated midline shift. Hypodense cyst at the cerebellum on the left. There is no evidence of midline shift or hydrocephalus. Posterior fossa structures are unremarkable. No extra-axial collections are seen. Mastoid air cells are well pneumatized and clear.  There is no evidence of depressed skull fractures of soft tissue swelling.     Area of hypodensity in the right posterior frontal lobe and parietal lobe is nonspecific, may be related to underlying mass lesion or infarction, minimal hemorrhage may be superimposed. Correlation with MRI of the brain with and without

## 2024-05-28 NOTE — PROGRESS NOTES
MRI PENDING    Completion of MRI Screening Sheet    Fax to [phone] when completed    Please call [phone]  When this is done    Thank You

## 2024-05-28 NOTE — ED TRIAGE NOTES
Pt to er via ems for c/o left sided numbness/ tingling since 1200pm today. Upon arrival pt has numbness in upper left arm and has a drift. Pt is ataxic when ambulating to stretcher. Was recently seen Saturday and dx with a tia. Is not on blood thinners. Dr. Linda at bedside, level 1 stroke called.

## 2024-05-28 NOTE — PROCEDURES
EEG REPORT    Patient Name: Diana Hercules  : 1960  Age: 63 y.o.    Ordering physician: Dr. Santo  Date of EE2024  14\"31  14:51  Diagnosis: seizure  Interpreting physician: Davidson Martinez D.O. FAAN    Procedure: EEG    CLINICAL INDICATION: The patient is a 63 y.o. female who is being evaluated for baseline electro cerebral activities and to rule out seizure focus.      Current Facility-Administered Medications   Medication Dose Route Frequency    levETIRAcetam (KEPPRA) tablet 500 mg  500 mg Oral BID     Current Outpatient Medications   Medication Sig    aspirin 81 MG chewable tablet Take 1 tablet by mouth daily    atorvastatin (LIPITOR) 20 MG tablet Take 1 tablet by mouth nightly    UNABLE TO FIND Physical therapy    levothyroxine (SYNTHROID) 25 MCG tablet TAKE 1 AND 1/2 TABLET BY MOUTH DAILY BEFORE BREAKFAST    metFORMIN (GLUCOPHAGE) 1000 MG tablet Take 1 tablet by mouth 2 times daily (with meals)    spironolactone (ALDACTONE) 50 MG tablet Take 1 tablet by mouth 2 times daily           DESCRIPTION OF PROCEDURE:     This is a digitally recorded electroencephalogram  Electrodes were applied in accordance with the international 10-20 system of electrode placement.    18 channels of scalp EEG are recorded  A channel was used for EoG  Another channel was used for ECG   The data is stored digitally and reviewed in reformatted montages for optimal display  EEG  was reviewed in both bipolar and referential montages    Description of Activity:    The background of this recording contains a posteriorly-located occipital alpha rhythm of 9-10 hz that attenuates with eye opening.  This was seen maximally over the posterior head region and was symmetric.  There was a small amount of beta activity seen.    Throughout the recording, there were no clear areas of focal slowing nor spike or spike-and-wave discharges seen.     Hyperventilation was not performed. Photic stimulation produced no response in the

## 2024-05-28 NOTE — TELEPHONE ENCOUNTER
Patient states that she was released from the hospital yesterday she says she got up this morning and found her son dead on the sofa she need to be seen soon her scott back number is [phone]  
Returned call to pt, requesting hosp follow up appt for CVA, discharged on 5/27/24,  offered pt appt on 6/6/24@ 11am,  pt declined stated will go back to hospital.   
dietitian/nutrition services

## 2024-05-28 NOTE — CONSULTS
Neurology Note    Patient ID:  Diana Hercules  [id number]  63 y.o.  1960      Date of Consultation:  May 28, 2024    Referring physician:  Dr. Santo  Reason for consultation:  left sided weakness, shaking    Assessment and Plan:    The patient is a pleasant 63-year-old female who presents with recent hospitalization for a right MCA territory stroke who presented today with worsening of the left upper and lower extremity weakness and sensory symptoms.  The patient also did have involuntary convulsions of the left upper extremity.  Upon neurological examination, the patient does have increasing weakness in the left upper and lower extremity from previously.      Worsening of neurological symptoms:  Concern for extension of prior stroke, also potential new onset seizure with postictal state  Head CT with no acute abnormality.  Recent subacute stroke noted with mild petechial hemorrhage  CTA with no large vessel occlusion  I have ordered a follow-up MRI  The small degree of hemorrhage has not changed from prior study. will continue with low-dose aspirin for the time being. No dual anti-platelet therapy  Neurological examination to be watched closely  Due to small petechial hemorrhage, keep sbp < 140.  I have ordered echo. Not performed during last hospital stay  The patient should be maintained on telemetry  Dyslipidemia: continue statin therapy.  No contraindication from a neuro perspective.  Goal LDL is less than 70. Updated LDL 90.  Diabetes:  Updated hemoglobin A1c 11.2. needs aggressive glycemic control  PT, OT consultation  I provided stroke education again today in regards to risk factors for stroke and lifestyle modifications to help minimize the risk of future stroke.  This included medication compliance, regular follow up with primary care physician,  and healthy lifestyle habits (nutrition/exercise)    new onset seizure:  Recent acute stroke can be contributing factor  Continue Keppra on 500 mg twice a

## 2024-05-29 ENCOUNTER — APPOINTMENT (OUTPATIENT)
Facility: HOSPITAL | Age: 64
DRG: 064 | End: 2024-05-29
Attending: STUDENT IN AN ORGANIZED HEALTH CARE EDUCATION/TRAINING PROGRAM
Payer: COMMERCIAL

## 2024-05-29 PROBLEM — G45.9 TIA (TRANSIENT ISCHEMIC ATTACK): Status: ACTIVE | Noted: 2024-05-29

## 2024-05-29 PROBLEM — I65.23 BILATERAL CAROTID ARTERY STENOSIS: Status: ACTIVE | Noted: 2024-05-29

## 2024-05-29 PROBLEM — E11.9 NEW ONSET TYPE 2 DIABETES MELLITUS (HCC): Status: ACTIVE | Noted: 2022-05-27

## 2024-05-29 LAB
CHOLEST SERPL-MCNC: 116 MG/DL
EKG ATRIAL RATE: 114 BPM
EKG DIAGNOSIS: NORMAL
EKG P AXIS: 66 DEGREES
EKG P-R INTERVAL: 166 MS
EKG Q-T INTERVAL: 330 MS
EKG QRS DURATION: 80 MS
EKG QTC CALCULATION (BAZETT): 454 MS
EKG R AXIS: 39 DEGREES
EKG T AXIS: 29 DEGREES
EKG VENTRICULAR RATE: 114 BPM
ERYTHROCYTE [DISTWIDTH] IN BLOOD BY AUTOMATED COUNT: 12.1 % (ref 11.5–14.5)
GLUCOSE BLD STRIP.AUTO-MCNC: 114 MG/DL (ref 65–117)
GLUCOSE BLD STRIP.AUTO-MCNC: 202 MG/DL (ref 65–117)
GLUCOSE BLD STRIP.AUTO-MCNC: 210 MG/DL (ref 65–117)
GLUCOSE BLD STRIP.AUTO-MCNC: 233 MG/DL (ref 65–117)
HCT VFR BLD AUTO: 36.9 % (ref 35–47)
HDLC SERPL-MCNC: 39 MG/DL
HDLC SERPL: 3 (ref 0–5)
HGB BLD-MCNC: 12.5 G/DL (ref 11.5–16)
LDLC SERPL CALC-MCNC: 54.8 MG/DL (ref 0–100)
MCH RBC QN AUTO: 27.9 PG (ref 26–34)
MCHC RBC AUTO-ENTMCNC: 33.9 G/DL (ref 30–36.5)
MCV RBC AUTO: 82.4 FL (ref 80–99)
NRBC # BLD: 0 K/UL (ref 0–0.01)
NRBC BLD-RTO: 0 PER 100 WBC
PLATELET # BLD AUTO: 260 K/UL (ref 150–400)
PMV BLD AUTO: 11.6 FL (ref 8.9–12.9)
RBC # BLD AUTO: 4.48 M/UL (ref 3.8–5.2)
SERVICE CMNT-IMP: ABNORMAL
SERVICE CMNT-IMP: NORMAL
TRIGL SERPL-MCNC: 111 MG/DL
VAS LEFT CCA DIST EDV: 17.6 CM/S
VAS LEFT CCA DIST PSV: 66.1 CM/S
VAS LEFT CCA PROX EDV: 23 CM/S
VAS LEFT CCA PROX PSV: 104.3 CM/S
VAS LEFT ECA EDV: 14.19 CM/S
VAS LEFT ECA PSV: 104.9 CM/S
VAS LEFT ICA DIST EDV: 12.3 CM/S
VAS LEFT ICA DIST PSV: 31 CM/S
VAS LEFT ICA MID EDV: 14.5 CM/S
VAS LEFT ICA MID PSV: 33.2 CM/S
VAS LEFT ICA PROX EDV: 11.6 CM/S
VAS LEFT ICA PROX PSV: 52 CM/S
VAS LEFT ICA/CCA PSV: 0.79 NO UNITS
VAS LEFT SUBCLAVIAN PROX EDV: 0 CM/S
VAS LEFT SUBCLAVIAN PROX PSV: 162.4 CM/S
VAS LEFT VERTEBRAL EDV: 20.7 CM/S
VAS LEFT VERTEBRAL PSV: 62.1 CM/S
VAS RIGHT CCA DIST EDV: 15.4 CM/S
VAS RIGHT CCA DIST PSV: 56.8 CM/S
VAS RIGHT CCA PROX EDV: 12.8 CM/S
VAS RIGHT CCA PROX PSV: 67.2 CM/S
VAS RIGHT ECA EDV: 6.63 CM/S
VAS RIGHT ECA PSV: 67.7 CM/S
VAS RIGHT ICA DIST EDV: 15.9 CM/S
VAS RIGHT ICA DIST PSV: 33.3 CM/S
VAS RIGHT ICA MID EDV: 14.1 CM/S
VAS RIGHT ICA MID PSV: 33.3 CM/S
VAS RIGHT ICA PROX EDV: 9.8 CM/S
VAS RIGHT ICA PROX PSV: 36.8 CM/S
VAS RIGHT ICA/CCA PSV: 0.7 NO UNITS
VAS RIGHT SUBCLAVIAN PROX EDV: 0 CM/S
VAS RIGHT SUBCLAVIAN PROX PSV: 98.4 CM/S
VAS RIGHT VERTEBRAL EDV: 19.1 CM/S
VAS RIGHT VERTEBRAL PSV: 54.9 CM/S
VLDLC SERPL CALC-MCNC: 22.2 MG/DL
WBC # BLD AUTO: 8.9 K/UL (ref 3.6–11)

## 2024-05-29 PROCEDURE — 97530 THERAPEUTIC ACTIVITIES: CPT

## 2024-05-29 PROCEDURE — 6370000000 HC RX 637 (ALT 250 FOR IP): Performed by: STUDENT IN AN ORGANIZED HEALTH CARE EDUCATION/TRAINING PROGRAM

## 2024-05-29 PROCEDURE — 92610 EVALUATE SWALLOWING FUNCTION: CPT

## 2024-05-29 PROCEDURE — 36415 COLL VENOUS BLD VENIPUNCTURE: CPT

## 2024-05-29 PROCEDURE — 85027 COMPLETE CBC AUTOMATED: CPT

## 2024-05-29 PROCEDURE — 97165 OT EVAL LOW COMPLEX 30 MIN: CPT | Performed by: OCCUPATIONAL THERAPIST

## 2024-05-29 PROCEDURE — 93880 EXTRACRANIAL BILAT STUDY: CPT | Performed by: PSYCHIATRY & NEUROLOGY

## 2024-05-29 PROCEDURE — 82962 GLUCOSE BLOOD TEST: CPT

## 2024-05-29 PROCEDURE — 93880 EXTRACRANIAL BILAT STUDY: CPT

## 2024-05-29 PROCEDURE — 80061 LIPID PANEL: CPT

## 2024-05-29 PROCEDURE — 6370000000 HC RX 637 (ALT 250 FOR IP)

## 2024-05-29 PROCEDURE — 6370000000 HC RX 637 (ALT 250 FOR IP): Performed by: PSYCHIATRY & NEUROLOGY

## 2024-05-29 PROCEDURE — 99232 SBSQ HOSP IP/OBS MODERATE 35: CPT | Performed by: PSYCHIATRY & NEUROLOGY

## 2024-05-29 PROCEDURE — 2060000000 HC ICU INTERMEDIATE R&B

## 2024-05-29 PROCEDURE — 97162 PT EVAL MOD COMPLEX 30 MIN: CPT

## 2024-05-29 PROCEDURE — 99221 1ST HOSP IP/OBS SF/LOW 40: CPT

## 2024-05-29 PROCEDURE — 2580000003 HC RX 258: Performed by: STUDENT IN AN ORGANIZED HEALTH CARE EDUCATION/TRAINING PROGRAM

## 2024-05-29 PROCEDURE — 97535 SELF CARE MNGMENT TRAINING: CPT | Performed by: OCCUPATIONAL THERAPIST

## 2024-05-29 PROCEDURE — 97530 THERAPEUTIC ACTIVITIES: CPT | Performed by: OCCUPATIONAL THERAPIST

## 2024-05-29 RX ORDER — ACETAMINOPHEN 325 MG/1
650 TABLET ORAL EVERY 4 HOURS PRN
Status: DISCONTINUED | OUTPATIENT
Start: 2024-05-29 | End: 2024-05-31 | Stop reason: HOSPADM

## 2024-05-29 RX ORDER — METFORMIN HYDROCHLORIDE 500 MG/1
500 TABLET, EXTENDED RELEASE ORAL
Status: DISCONTINUED | OUTPATIENT
Start: 2024-05-29 | End: 2024-05-31

## 2024-05-29 RX ORDER — GLIPIZIDE 5 MG/1
5 TABLET ORAL
Status: DISCONTINUED | OUTPATIENT
Start: 2024-05-29 | End: 2024-05-31

## 2024-05-29 RX ADMIN — SPIRONOLACTONE 50 MG: 25 TABLET ORAL at 21:04

## 2024-05-29 RX ADMIN — INSULIN LISPRO 1 UNITS: 100 INJECTION, SOLUTION INTRAVENOUS; SUBCUTANEOUS at 11:53

## 2024-05-29 RX ADMIN — INSULIN LISPRO 1 UNITS: 100 INJECTION, SOLUTION INTRAVENOUS; SUBCUTANEOUS at 09:42

## 2024-05-29 RX ADMIN — METFORMIN HYDROCHLORIDE 500 MG: 500 TABLET, EXTENDED RELEASE ORAL at 09:47

## 2024-05-29 RX ADMIN — LEVOTHYROXINE SODIUM 25 MCG: 0.03 TABLET ORAL at 06:59

## 2024-05-29 RX ADMIN — LEVETIRACETAM 500 MG: 500 TABLET, FILM COATED ORAL at 09:40

## 2024-05-29 RX ADMIN — LEVETIRACETAM 500 MG: 500 TABLET, FILM COATED ORAL at 21:04

## 2024-05-29 RX ADMIN — GLIPIZIDE 5 MG: 5 TABLET ORAL at 09:47

## 2024-05-29 RX ADMIN — ATORVASTATIN CALCIUM 20 MG: 20 TABLET, FILM COATED ORAL at 21:04

## 2024-05-29 RX ADMIN — ACETAMINOPHEN 650 MG: 325 TABLET ORAL at 13:21

## 2024-05-29 RX ADMIN — SODIUM CHLORIDE, PRESERVATIVE FREE 10 ML: 5 INJECTION INTRAVENOUS at 21:11

## 2024-05-29 RX ADMIN — SODIUM CHLORIDE, PRESERVATIVE FREE 10 ML: 5 INJECTION INTRAVENOUS at 09:48

## 2024-05-29 RX ADMIN — ASPIRIN 81 MG: 81 TABLET, CHEWABLE ORAL at 09:41

## 2024-05-29 RX ADMIN — SPIRONOLACTONE 50 MG: 25 TABLET ORAL at 09:41

## 2024-05-29 RX ADMIN — INSULIN LISPRO 3 UNITS: 100 INJECTION, SOLUTION INTRAVENOUS; SUBCUTANEOUS at 17:29

## 2024-05-29 RX ADMIN — INSULIN LISPRO 3 UNITS: 100 INJECTION, SOLUTION INTRAVENOUS; SUBCUTANEOUS at 11:53

## 2024-05-29 RX ADMIN — INSULIN LISPRO 3 UNITS: 100 INJECTION, SOLUTION INTRAVENOUS; SUBCUTANEOUS at 09:43

## 2024-05-29 ASSESSMENT — PAIN SCALES - GENERAL
PAINLEVEL_OUTOF10: 0
PAINLEVEL_OUTOF10: 3
PAINLEVEL_OUTOF10: 6

## 2024-05-29 ASSESSMENT — PAIN DESCRIPTION - LOCATION: LOCATION: HEAD

## 2024-05-29 ASSESSMENT — PAIN SCALES - WONG BAKER: WONGBAKER_NUMERICALRESPONSE: HURTS A LITTLE BIT

## 2024-05-29 ASSESSMENT — PAIN DESCRIPTION - DESCRIPTORS: DESCRIPTORS: ACHING

## 2024-05-29 NOTE — PROGRESS NOTES
Hospitalist Progress Note    NAME:   Diana Hercules   : 1960   MRN: [id number]     Date/Time: 2024 12:23 PM  Patient PCP: Chucho Barrientos MD    Estimated discharge date:  Barriers:       Assessment / Plan:    Worsening of the left upper and lower extremity weakness and sensory symptoms   Recent stroke  Suspected seizures    Recent right MCA territory stroke  with mild petechial hemorrhage on   CTA with no large vessel occlusion   MRI on : an acute stroke in the right frontal parietal region with mild superimposed hemorrhage. 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 Evolving subacute right posterior MCA territory infarct with cortical  petechial hemorrhage. No new areas of acute infarction.  2. Stable left posterior fossa epidermoid cyst.     EEG -negative for Seizure activity   MRI brain  Continue with Aspirin ,statin   Continue with Keppra 500mg bid  Neurology following  PT/OT evaluation     Acute grief   -Patient found her son  on the morning of presentation  -Continue with emotional support      DM with HbA1c 11.2   -Started on Insulin glargine and lispro  Resumed metformin, glipizide added by diabetes team  HTN   Hypertensive cardiomyopathy   Mitral valve prolapse   BP goal <140   Continue with aldactone   Labetalol prn to maintain BP   Sinus tachycardia -continue to monitor      Hypothyroidism  -Continue with Levothyroxine      Medical Decision Making:   I personally reviewed labs: Yes   I personally reviewed imaging:Yes   I personally reviewed EKG:Sinus tachycardia   Toxic drug monitoring: Keppra   Discussed case with: RN,      Code Status: Full   DVT Prophylaxis: SCD  Baseline: Independent     Subjective:     Chief Complaint / Reason for Physician Visit  Seen and evaluated at the bedside for worsening symptoms  No focal symptoms today    Objective:     VITALS:   Last 24hrs VS reviewed since prior progress note. Most recent  are:  Patient Vitals for the past 24 hrs:   BP Temp Temp src Pulse Resp SpO2 Height Weight   05/29/24 1105 121/83 97.6 °F (36.4 °C) Oral (!) 110 19 -- -- --   05/29/24 0730 126/88 97.6 °F (36.4 °C) Oral (!) 106 16 96 % -- --   05/29/24 0305 109/74 97.6 °F (36.4 °C) Oral 91 18 91 % -- --   05/28/24 2250 127/81 97.8 °F (36.6 °C) Oral 99 20 95 % -- --   05/28/24 1950 126/88 97.4 °F (36.3 °C) Oral (!) 104 18 97 % -- --   05/28/24 1730 (!) 131/92 97.7 °F (36.5 °C) Oral (!) 118 22 99 % -- --   05/28/24 1630 (!) 127/91 -- -- (!) 110 22 -- -- --   05/28/24 1547 (!) 135/103 -- -- (!) 118 20 -- -- --   05/28/24 1532 (!) 136/95 -- -- (!) 112 19 -- -- --   05/28/24 1445 103/74 -- -- (!) 103 20 -- -- --   05/28/24 1430 106/76 -- -- 99 20 -- -- --   05/28/24 1415 -- -- -- (!) 112 26 -- -- --   05/28/24 1400 (!) 122/104 -- -- (!) 119 19 -- -- --   05/28/24 1345 -- -- -- (!) 120 21 -- -- --   05/28/24 1330 117/89 -- -- -- -- -- -- --   05/28/24 1254 (!) 149/99 97.7 °F (36.5 °C) Oral (!) 128 16 100 % 1.575 m (5' 2\") --   05/28/24 1245 -- -- -- -- -- -- -- 71.6 kg (157 lb 13.6 oz)       No intake or output data in the 24 hours ending 05/29/24 1223     I had a face to face encounter and independently examined this patient on 5/29/2024, as outlined below:  PHYSICAL EXAM:  General: Alert, cooperative  EENT:  EOMI. Anicteric sclerae.  Resp:  CTA bilaterally, no wheezing or rales.  No accessory muscle use  CV:  Regular  rhythm,  No edema  GI:  Soft, Non distended, Non tender.  +Bowel sounds  Neurologic:  Alert and oriented X 3, normal speech,   Psych:   Good insight. Not anxious nor agitated  Skin:  No rashes.  No jaundice    Reviewed most current lab test results and cultures  YES  Reviewed most current radiology test results   YES  Review and summation of old records today    NO  Reviewed patient's current orders and MAR    YES  PMH/SH reviewed - no change compared to H&P    Procedures: see electronic medical records for all

## 2024-05-29 NOTE — PROGRESS NOTES
Pt AOX4, able to make needs known. Pt had duplex carotid done this afternoon. No seizure activity noted this shift. Ambulates to the bathroom with standby assist. Emotional support given. Safety precaution maintained.

## 2024-05-29 NOTE — CARE COORDINATION
Care Management Initial Assessment       RUR: 16%  Readmission? yes     24 1551   Service Assessment   Patient Orientation Alert and Oriented   Cognition Alert   History Provided By Patient   Primary Caregiver Self   Support Systems Children;Family Members   Patient's Healthcare Decision Maker is: Patient Declined (Legal Next of Kin Remains as Decision Maker)   PCP Verified by CM Yes   Last Visit to PCP Within last 3 months   Prior Functional Level Independent in ADLs/IADLs   Current Functional Level Independent in ADLs/IADLs   Can patient return to prior living arrangement Yes   Family able to assist with home care needs: Yes   Would you like for me to discuss the discharge plan with any other family members/significant others, and if so, who? No   Social/Functional History   Lives With Family  (granddaughter)   Type of Home House   Home Layout One level   Entrance Stairs - Number of Steps 4   Home Equipment None   Active  Yes   Services At/After Discharge   Mode of Transport at Discharge Other (see comment)  (daughter)   Confirm Follow Up Transport Family   Condition of Participation: Discharge Planning   The Plan for Transition of Care is related to the following treatment goals: home   The Patient and/or Patient Representative was provided with a Choice of Provider? Patient   The Patient and/Or Patient Representative agree with the Discharge Plan? Yes       Readmission Assessment  Number of Days since last admission?: 1-7 days  Previous Disposition: Home with Family  Who is being Interviewed: Patient  What was the patient's/caregiver's perception as to why they think they needed to return back to the hospital?: Other (Comment) (patient expierenced seziure like activity after finding her son  on couch)  Did you visit your Primary Care Physician after you left the hospital, before you returned this time?: No  Why weren't you able to visit your PCP?: Did not have an appointment (patient reports  she thought she had an appointment scheduled with PCP 5/28 but was informed she did not)  Did you see a specialist, such as Cardiac, Pulmonary, Orthopedic Physician, etc. after you left the hospital?: No  Who advised the patient to return to the hospital?: Self-referral  Does the patient report anything that got in the way of taking their medications?: No  In our efforts to provide the best possible care to you and others like you, can you think of anything that we could have done to help you after you left the hospital the first time, so that you might not have needed to return so soon?: Other (Comment) (patient denied)    ZENAIDA Bradley, CM  [id number]

## 2024-05-29 NOTE — DIABETES MGMT
[facility]  DIABETES MANAGEMENT CONSULT    Consulted by Provider for advanced nursing evaluation and care for inpatient blood glucose management.    Evaluation and Action Plan   Diana Hercules is a 63 year old female with new onset  DM. The patient reports a hx of prediabetes.She was admitted for seizure like activity. It is also noted she found her son  in the morning. The patient had a recent admission for a stroke as well. The patient has a current A1c of 11.2%. She will likely require diabetes medication at discharge. She was prescribed Metformin in the past but did not take it. I have started Metformin and glipizide. This will give us some time to determine effectiveness before discharge.      Management Rationale Action Plan   Medication   Nutritional needs Using oral diabetes medication Using Metformin and glipizide   Corrective insulin Using medium dose  sensitivity based on weight    Additional orders          Diabetes Discharge Plan   Medication  TBD   Referral  []        Outpatient diabetes education   Additional orders            Initial Presentation   Diana Hercules is a 63 y.o. female admitted  after experiencing uncontrollable shaking of left arm and leg. Weakness. Recent dx of ischemic stroke with hemorrhagic stroke. .  LAB: Glucose 335.   CXR:  CT:  Narrative & Impression  EXAM: CT CODE NEURO HEAD WO CONTRAST     INDICATION: Left upper extremity numbness and tingling starting at noon today.  Recent MCA territory infarct.     COMPARISON: CT head on 2024. MRI brain on 2024..     CONTRAST: None.     TECHNIQUE: Unenhanced CT of the head was performed using 5 mm images. Brain and  bone windows were generated. Coronal and sagittal reformats. CT dose reduction  was achieved through use of a standardized protocol tailored for this  examination and automatic exposure control for dose modulation.       FINDINGS:  The ventricles and sulci are normal in size, shape  handouts  Role of physical activity in improving insulin sensitivity and action  Procedure for blood glucose monitoring & options for low-cost products  Medications plan at discharge     Billing Code(s)   [] 45490 IP subsequent hospital care - 50 minutes   [] 37922 IP subsequent hospital care - 35 minutes   [] 97701 IP subsequent hospital care - 25 minutes   [x] 37396 IP initial hospital care - 40 minutes     Before making these care recommendations, I personally reviewed the hospitalization record, including notes, laboratory & diagnostic data and current medications, and examined the patient at the bedside.  Total minutes: 40 minutes    LAURENCE Carrero  Diabetes Clinical Nurse Specialist  [facility]  Access via Medical Simulation

## 2024-05-29 NOTE — PLAN OF CARE
Speech LAnguage Pathology EVALUATION/DISCHARGE    Patient: Diana Hercules (63 y.o. female)  Date: 5/29/2024  Primary Diagnosis: Breakthrough seizure (HCC) [G40.919]  Cerebrovascular accident (CVA) due to thrombosis of right middle cerebral artery (HCC) [I63.311]       Precautions:                     ASSESSMENT :  Patient presents with seemingly functional oropharyngeal swallow function. Oral structures and functions seemingly WNL. No overt clinical s/s aspiration across thin via cup/straw and solid trials, even when taking consecutive sips thin via straw. Patient appears appropriate for diet advancement to regular diet with thin liquids. SLP educated patient on aspiration precautions. No further acute SLP services warranted at this time. SLP will sign off.     Patient will be discharged from skilled speech-language pathology services at this time.     PLAN :  Recommendations and Planned Interventions:  Diet: Regular and thin liquids  --Medications as tolerated   --Upright all PO intake   --Oral hygiene 2-3x/day           Acute SLP Services: No, patient will be discharged from acute skilled speech-language pathology at this time.    Discharge Recommendations: No, additional SLP treatment not indicated at discharge     SUBJECTIVE:   Patient stated, “I'm just so overwhelmed.”    OBJECTIVE:     Past Medical History:   Diagnosis Date    BMI 33.0-33.9,adult 1/29/2018    Cardiomegaly - hypertensive 6/4/2015    Family history of sarcoidosis 6/4/2015    HTN (hypertension) 3/5/14 notes    norma disla notes rec'd    Hypercholesteremia 9/24/2014    Hypothyroidism 10/8/2014    Irregular heart beat 9/24/2014    Midline thoracic back pain 6/4/2015    MVP (mitral valve prolapse) 9/24/2014    Prediabetes 9/24/2014    Screen for colon cancer 1/29/2018    Thyroid disease     Vitamin D deficiency 9/24/2014     Past Surgical History:   Procedure Laterality Date    GYN      HYSTERECTOMY (CERVIX STATUS UNKNOWN) N/A 1995    UROLOGICAL  SURGERY       Prior Level of Function/Home Situation:        Baseline Assessment:  Current Diet : NPO  Current Liquid Diet : NPO  Prior Dysphagia History: no previous SLP services documented. Patient denies h/o difficulty swallowing  Patient Complaint: none    Cognitive and Communication Status:  Neurologic State: Alert  Orientation Level: Oriented x4  Cognition: Appropriate for age attention/concentration and Follows commands    Dysphagia:  Oral Assessment:  Oral Motor   Labial: No impairment  Dentition: Natural  Oral Hygiene: Clean  Lingual: No impairment  Velum: No Impairment  P.O. Trials:  PO Trials  Assessment Method(s): Observation  Patient Position: upright in bed  Vocal Quality: No Impairment  Consistency Presented: Thin;Regular  How Presented: Self-fed/presented;Straw;Cup/gulp;Successive Swallows  Bolus Acceptance: No impairment  Bolus Formation/Control: No impairment  Oral Residue: None  Aspiration Signs/Symptoms: None  Pharyngeal Phase Characteristics: No impairment, issues, or problems            Motor Speech:         Language Comprehension and Expression:                   Neuro-Linguistics:                      Voice:       Respiratory Status/Airway:  Room air                           Functional Oral Intake Scale (FOIS): 7--Total oral diet with no restrictions    After treatment:   Patient left in no apparent distress in bed, Call bell left within reach, and Nursing notified    COMMUNICATION/EDUCATION:   Patient was educated regarding role of SLP and aspiration precautions.     The patient's plan of care including recommendations, planned interventions, and recommended diet changes were discussed with: Registered nurse and Certified nursing assistant/patient care technician    Patient/family have participated as able in goal setting and plan of care and Patient/family agree to work toward stated goals and plan of care    Thank you,  PRABHU Mancera  Minutes: 12

## 2024-05-29 NOTE — PROGRESS NOTES
Spiritual Care Assessment/Progress Note  [facility]    Name: Diana Hercules MRN: [id number]    Age: 63 y.o.     Sex: female   Language: English     Date: 5/29/2024            Total Time Calculated: 8 min              Spiritual Assessment begun in [facility] CARDIOPULMONARY CARE        Encounter Overview/Reason: Initial Encounter    Spiritual beliefs:      [] Involved in a roque tradition/spiritual practice:      [] Supported by a roque community:      [] Claims no spiritual orientation:      [] Seeking spiritual identity:           [] Adheres to an individual form of spirituality:      [x] Not able to assess:                Identified resources for coping and support system:   Support System: Unknown       [] Prayer                  [] Devotional reading               [] Music                  [] Guided Imagery     [] Pet visits                                        [] Other: (COMMENT)     Specific area/focus of visit   Encounter:    Crisis:    Spiritual/Emotional needs:    Ritual, Rites and Sacraments:    Grief, Loss, and Adjustments:    Ethics/Mediation:    Behavioral Health:    Palliative Care:    Advance Care Planning:      Plan/Referrals: Continue Support (comment)    Narrative:   Responded to consult from patient's nurse for spiritual/emotional support. Consult with nursing staff and visited patient in 2165. She was not present in her room, not sure if was in bath room, but she did not respond when  mentioned her name. Please contact [facility] for any further referrals.     Visited by: Chaplain Paul Brown M.Div., [facility].   Paging Service: ChristianKOBE ([phone])

## 2024-05-29 NOTE — PLAN OF CARE
Problem: Discharge Planning  Goal: Discharge to home or other facility with appropriate resources  5/28/2024 2351 by Murray Bear RN  Outcome: Progressing  5/28/2024 1741 by Keisha Brand RN  Outcome: Progressing     Problem: Safety - Adult  Goal: Free from fall injury  5/28/2024 2351 by Murray Bear RN  Outcome: Progressing  5/28/2024 1741 by Keisha Brand RN  Outcome: Progressing     Problem: ABCDS Injury Assessment  Goal: Absence of physical injury  Outcome: Progressing

## 2024-05-29 NOTE — PROGRESS NOTES
Neurology Note    Patient ID:  Diana Hercules  [id number]  63 y.o.  1960      Date of Consultation:  May 29, 2024      Assessment and Plan:    The patient is a pleasant 63-year-old female who presents with recent hospitalization for a right MCA territory stroke who presented today with worsening of the left upper and lower extremity weakness and sensory symptoms.  The patient also did have involuntary convulsions of the left upper extremity.  Symptoms have improved since admission.    Transient worsening of neurological symptoms:  Brain mri with no new stroke or extension of prior stroke  Differential includes recrudescence of stroke symptoms associated with acute traumatic event vs seizure with post ictal status.  Head CT with no acute abnormality.  Recent subacute stroke noted with mild petechial hemorrhage  CTA with no large vessel occlusion  The small degree of hemorrhage has not changed from prior study. will continue with low-dose aspirin for the time being. No dual anti-platelet therapy  Htn: keep sbp < 140.   Echo pending.  Not performed during last hospital stay. Should be obtained prior to discharge. If abnormal, consider cardiology consult.  The patient should be maintained on telemetry  Dyslipidemia: continue statin therapy.   Goal LDL is less than 70. Updated LDL 90.  Diabetes:  Updated hemoglobin A1c 11.2. needs aggressive glycemic control  PT, OT following  I provided stroke education again today in regards to risk factors for stroke and lifestyle modifications to help minimize the risk of future stroke.  This included medication compliance, regular follow up with primary care physician,  and healthy lifestyle habits (nutrition/exercise)    new onset seizure:  Recent acute stroke can be contributing factor  Continue Keppra on 500 mg twice a day.    Full channel eeg with no seizure focus.  Can consider follow up ambulatory eeg monitoring as an outpatient  Will determine at that time if patient

## 2024-05-29 NOTE — ED PROVIDER NOTES
[facility] EMERGENCY DEPARTMENT  EMERGENCY DEPARTMENT ENCOUNTER       Pt Name: Diana Hercules  MRN: [id number]  Birthdate 1960  Date of evaluation: 2024  Provider: Surendra Linda MD   PCP: Chucho Barrientos MD  Note Started: 1:06 AM EDT 24     CHIEF COMPLAINT       Chief Complaint   Patient presents with   • Extremity Weakness        HISTORY OF PRESENT ILLNESS: 1 or more elements      History From: patient, History limited by: none     Diana Hercules is a 63 y.o. female w recent diagnosis of ischemic stroke with hemorrhagic conversion presented to the ED with EMS.  She found her son  at home this morning.  She then developed uncontrollable shaking of her left arm and leg with sensory changes and weakness.  She denies any recent fevers, chills, illicit drug use.  She feels similar to how she felt on presentation to the hospital when her stroke was diagnosed, but she had no shaking.    Please See MDM for Additional Details of the HPI/PMH  Nursing Notes were all reviewed and agreed with or any disagreements were addressed in the HPI.     REVIEW OF SYSTEMS        Positives and Pertinent negatives as per HPI.    PAST HISTORY     Past Medical History:  Past Medical History:   Diagnosis Date   • BMI 33.0-33.9,adult 2018   • Cardiomegaly - hypertensive 2015   • Family history of sarcoidosis 2015   • HTN (hypertension) 3/5/14 notes    norma disla notes rec'd   • Hypercholesteremia 2014   • Hypothyroidism 10/8/2014   • Irregular heart beat 2014   • Midline thoracic back pain 2015   • MVP (mitral valve prolapse) 2014   • Prediabetes 2014   • Screen for colon cancer 2018   • Thyroid disease    • Vitamin D deficiency 2014       Past Surgical History:  Past Surgical History:   Procedure Laterality Date   • GYN     • HYSTERECTOMY (CERVIX STATUS UNKNOWN) N/A    • UROLOGICAL SURGERY         Family History:  No family history on file.    Social History:  Social    Patient was given the following medications:  - 2mg IV ativan  - 2g IV keppra  - IVF      Medical Decision Making  Patient presented to the hospital with left-sided neuro changes and rhythmic shaking of left-sided.  Presentation suspicious for partial seizure - her recent ischemic stroke with hemorrhagic conversion makes this more likely.  She was aggressively treated with IV ativan, IV keppra, and IVF.  Her seizure stopped.  Stroke alert was initiated on arrival given her history.  She was not a TNK candidate with recent stroke w hemorrhagic conversion.  No LVO was seen.  No seizure was seen on EEG after her seizure-like activity clinically stopped.  She was admitted to hospital medicine service for further management; Dr. Martinez with neurology saw the patient in the ED.    Amount and/or Complexity of Data Reviewed  Labs: ordered. Decision-making details documented in ED Course.  Radiology: ordered. Decision-making details documented in ED Course.  ECG/medicine tests: ordered and independent interpretation performed. Decision-making details documented in ED Course.  Discussion of management or test interpretation with external provider(s): See ED course    Discussed ED workup, diagnosis, and management with Dr. Meade, hospitalist, who agreed to admit to medicine service    Risk  Prescription drug management.  Decision regarding hospitalization.        ED Course as of 05/29/24 0106   Tue May 28, 2024   1304 Dr. Rodriguez recommends MRI brain w/ and w/o as well as MRV; will discuss with Dr. Martinez [WB]   1344 EKG is interpreted by me with sinus rhythm, heart 114, normal axis, normal intervals, no ST change [WB]   1358 CT head interpreted by me with no midline shift.  Seizure-like activity has stopped.  Patient reports she feels improved.  Hyperglycemia noted, but no other lab abnormalities.  Spoke to Dr. Martinez, neurologist, who is currently at bedside evaluating patient - we discussed Dr. Rodriguez's recommendations.  Will

## 2024-05-29 NOTE — PLAN OF CARE
Problem: Discharge Planning  Goal: Discharge to home or other facility with appropriate resources  5/29/2024 1037 by Sharon Apodaca RN  Outcome: Progressing  5/28/2024 2351 by Murray Bear RN  Outcome: Progressing     Problem: Safety - Adult  Goal: Free from fall injury  5/29/2024 1037 by Sharon Apodaca RN  Outcome: Progressing  5/28/2024 2351 by Murray Bear RN  Outcome: Progressing     Problem: ABCDS Injury Assessment  Goal: Absence of physical injury  5/29/2024 1037 by Sharon Apodaca RN  Outcome: Progressing  5/28/2024 2351 by Murray Bear RN  Outcome: Progressing     Problem: Chronic Conditions and Co-morbidities  Goal: Patient's chronic conditions and co-morbidity symptoms are monitored and maintained or improved  Outcome: Progressing

## 2024-05-29 NOTE — PLAN OF CARE
Problem: Physical Therapy - Adult  Goal: By Discharge: Performs mobility at highest level of function for planned discharge setting.  See evaluation for individualized goals.  Description: FUNCTIONAL STATUS PRIOR TO ADMISSION: Patient was independent and active without use of DME. and The patient  was independent for basic and instrumental ADLs.    HOME SUPPORT PRIOR TO ADMISSION: The patient lived with son and grand child but did not require assistance. The patient found her son  at home the day of her admission and has acute grief and anxiety at this time.    Physical Therapy Goals  Initiated 2024  1.  Patient will move from supine to sit and sit to supine, scoot up and down, and roll side to side in bed with independence within 7 day(s).    2.  Patient will perform sit to stand with independence within 7 day(s).  3.  Patient will transfer from bed to chair and chair to bed with independence using the least restrictive device within 7 day(s).  4.  Patient will ambulate with independence for 250 feet with the least restrictive device within 7 day(s).   5.  Patient will ascend/descend 4 stairs with 1 handrail(s) with modified independence within 7 day(s).    Outcome: Not Progressing   PHYSICAL THERAPY EVALUATION    Patient: Diana Hercules (63 y.o. female)  Date: 2024  Primary Diagnosis: Breakthrough seizure (HCC) [G40.919]  Cerebrovascular accident (CVA) due to thrombosis of right middle cerebral artery (HCC) [I63.311]       Precautions: Restrictions/Precautions: Up as Tolerated, Fall Risk  Required Braces or Orthoses?: No Required Braces or Orthoses?: No                    ASSESSMENT :   DEFICITS/IMPAIRMENTS:   The patient is limited by decreased functional mobility, activity tolerance, endurance, attention/concentration, balance following admission on 24 for breakthrough seizure and evolving R posterior MCA subacute infarct. Discharged from hospital on 24 for acute CVA in the same

## 2024-05-29 NOTE — PROGRESS NOTES
..OCCUPATIONAL THERAPY EVALUATION/DISCHARGE  Patient: Diana Hercules (63 y.o. female)  Date: 5/29/2024  Primary Diagnosis: Breakthrough seizure (HCC) [G40.919]  Cerebrovascular accident (CVA) due to thrombosis of right middle cerebral artery (HCC) [I63.311]         Precautions:                    ASSESSMENT :  Based on the objective data below, the patient demonstrates WNL and symmetrical strength and coordination, with the exception of slight dysmetria noted in her LUE with formal testing. Her mild LUE dysmetria is not impacting her functional performance. Patient appears to be cognitively intact and was also noted to have intact sensation, WNL sitting balance and WFL standing balance for the performance of ADLs, transfers and ambulation without an AD on a level surface. She is currently without further OT needs, acutely and after discharge, as she is performing all ADLs and functional mobility at her independent baseline.     Further skilled acute occupational therapy is not indicated at this time.     PLAN :  Discharge from Acute OT    Recommendation for discharge: (in order for the patient to meet his/her long term goals): No skilled occupational therapy    IF patient discharges home will need the following DME: none       SUBJECTIVE:   Patient stated, “I feel like I'm back to my normal.”    OBJECTIVE DATA SUMMARY:     Past Medical History:   Diagnosis Date    BMI 33.0-33.9,adult 1/29/2018    Cardiomegaly - hypertensive 6/4/2015    Family history of sarcoidosis 6/4/2015    HTN (hypertension) 3/5/14 notes    norma disla notes rec'd    Hypercholesteremia 9/24/2014    Hypothyroidism 10/8/2014    Irregular heart beat 9/24/2014    Midline thoracic back pain 6/4/2015    MVP (mitral valve prolapse) 9/24/2014    Prediabetes 9/24/2014    Screen for colon cancer 1/29/2018    Thyroid disease     Vitamin D deficiency 9/24/2014     Past Surgical History:   Procedure Laterality Date    GYN      HYSTERECTOMY (CERVIX STATUS  within reach    COMMUNICATION/EDUCATION:   The patient's plan of care was discussed with: physical therapist and registered nurse    Patient Education  Education Given To: Patient  Education Provided: Role of Therapy;Plan of Care  Education Method: Verbal  Barriers to Learning: None  Education Outcome: Verbalized understanding    Thank you for this referral.  Tony Agudelo, OTR/L  Minutes: 23

## 2024-05-30 ENCOUNTER — APPOINTMENT (OUTPATIENT)
Facility: HOSPITAL | Age: 64
DRG: 064 | End: 2024-05-30
Attending: PSYCHIATRY & NEUROLOGY
Payer: COMMERCIAL

## 2024-05-30 LAB
ECHO BSA: 1.77 M2
ECHO LV INTERNAL DIMENSION DIASTOLIC MMODE: 5.1 CM (ref 3.9–5.3)
ECHO LV INTERNAL DIMENSION SYSTOLIC MMODE: 3.7 CM
ECHO LV IVSD MMODE: 1.1 CM (ref 0.6–0.9)
ECHO LV IVSS MMODE: 1.3 CM
ECHO LV POSTERIOR WALL DIASTOLIC MMODE: 1.1 CM (ref 0.6–0.9)
ECHO LV POSTERIOR WALL SYSTOLIC MMODE: 1.7 CM
GLUCOSE BLD STRIP.AUTO-MCNC: 104 MG/DL (ref 65–117)
GLUCOSE BLD STRIP.AUTO-MCNC: 131 MG/DL (ref 65–117)
GLUCOSE BLD STRIP.AUTO-MCNC: 196 MG/DL (ref 65–117)
GLUCOSE BLD STRIP.AUTO-MCNC: 235 MG/DL (ref 65–117)
SERVICE CMNT-IMP: ABNORMAL
SERVICE CMNT-IMP: NORMAL

## 2024-05-30 PROCEDURE — 93005 ELECTROCARDIOGRAM TRACING: CPT

## 2024-05-30 PROCEDURE — 2060000000 HC ICU INTERMEDIATE R&B

## 2024-05-30 PROCEDURE — 6370000000 HC RX 637 (ALT 250 FOR IP): Performed by: PSYCHIATRY & NEUROLOGY

## 2024-05-30 PROCEDURE — 82962 GLUCOSE BLOOD TEST: CPT

## 2024-05-30 PROCEDURE — 99232 SBSQ HOSP IP/OBS MODERATE 35: CPT | Performed by: PSYCHIATRY & NEUROLOGY

## 2024-05-30 PROCEDURE — 6370000000 HC RX 637 (ALT 250 FOR IP)

## 2024-05-30 PROCEDURE — 99231 SBSQ HOSP IP/OBS SF/LOW 25: CPT

## 2024-05-30 PROCEDURE — 6370000000 HC RX 637 (ALT 250 FOR IP): Performed by: STUDENT IN AN ORGANIZED HEALTH CARE EDUCATION/TRAINING PROGRAM

## 2024-05-30 PROCEDURE — 93325 DOPPLER ECHO COLOR FLOW MAPG: CPT

## 2024-05-30 PROCEDURE — 2580000003 HC RX 258: Performed by: STUDENT IN AN ORGANIZED HEALTH CARE EDUCATION/TRAINING PROGRAM

## 2024-05-30 PROCEDURE — 97116 GAIT TRAINING THERAPY: CPT

## 2024-05-30 RX ADMIN — LEVETIRACETAM 500 MG: 500 TABLET, FILM COATED ORAL at 08:48

## 2024-05-30 RX ADMIN — ATORVASTATIN CALCIUM 20 MG: 20 TABLET, FILM COATED ORAL at 20:50

## 2024-05-30 RX ADMIN — INSULIN LISPRO 3 UNITS: 100 INJECTION, SOLUTION INTRAVENOUS; SUBCUTANEOUS at 08:49

## 2024-05-30 RX ADMIN — METFORMIN HYDROCHLORIDE 500 MG: 500 TABLET, EXTENDED RELEASE ORAL at 08:48

## 2024-05-30 RX ADMIN — SPIRONOLACTONE 50 MG: 25 TABLET ORAL at 08:48

## 2024-05-30 RX ADMIN — GLIPIZIDE 5 MG: 5 TABLET ORAL at 06:18

## 2024-05-30 RX ADMIN — LEVOTHYROXINE SODIUM 25 MCG: 0.03 TABLET ORAL at 06:17

## 2024-05-30 RX ADMIN — LEVETIRACETAM 500 MG: 500 TABLET, FILM COATED ORAL at 20:50

## 2024-05-30 RX ADMIN — SODIUM CHLORIDE, PRESERVATIVE FREE 10 ML: 5 INJECTION INTRAVENOUS at 20:50

## 2024-05-30 RX ADMIN — SPIRONOLACTONE 50 MG: 25 TABLET ORAL at 20:50

## 2024-05-30 RX ADMIN — ASPIRIN 81 MG: 81 TABLET, CHEWABLE ORAL at 08:48

## 2024-05-30 ASSESSMENT — PAIN SCALES - GENERAL: PAINLEVEL_OUTOF10: 0

## 2024-05-30 ASSESSMENT — PAIN SCALES - WONG BAKER: WONGBAKER_NUMERICALRESPONSE: NO HURT

## 2024-05-30 NOTE — PROGRESS NOTES
Hospitalist Progress Note    NAME:   Diana Hercules   : 1960   MRN: [id number]     Date/Time: 2024 4:33 PM  Patient PCP: Chucho Barrientos MD    Estimated discharge date: once echo results  Barriers:       Assessment / Plan:    Worsening of the left upper and lower extremity weakness and sensory symptoms   Recent stroke  Suspected seizures    Recent right MCA territory stroke  with mild petechial hemorrhage on   CTA with no large vessel occlusion   MRI on : an acute stroke in the right frontal parietal region with mild superimposed hemorrhage. 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 Evolving subacute right posterior MCA territory infarct with cortical  petechial hemorrhage. No new areas of acute infarction.  2. Stable left posterior fossa epidermoid cyst.     EEG -negative for Seizure activity   Continue with Aspirin ,statin   Continue with Keppra 500mg bid  Neurology following  PT/OT evaluation: no skilled SNF  Can be discharged once echo done and resulted     Acute grief   -Patient found her son  on the morning of presentation  -Continue with emotional support      DM with HbA1c 11.2   -Started on Insulin glargine and lispro  Resumed metformin, glipizide added by diabetes team    HTN   Hypertensive cardiomyopathy   Mitral valve prolapse   BP goal <140   Continue with aldactone   Labetalol prn to maintain BP   Sinus tachycardia -continue to monitor      Hypothyroidism  -Continue with Levothyroxine      Medical Decision Making:   I personally reviewed labs: Yes   I personally reviewed imaging:Yes   I personally reviewed EKG:Sinus tachycardia   Toxic drug monitoring: Keppra   Discussed case with: RN,      Code Status: Full   DVT Prophylaxis: SCD  Baseline: Independent     Subjective:     Chief Complaint / Reason for Physician Visit  Seen and evaluated at the bedside for worsening symptoms  No new symptoms    Objective:     VITALS:    Last 24hrs VS reviewed since prior progress note. Most recent are:  Patient Vitals for the past 24 hrs:   BP Temp Temp src Pulse Resp SpO2 Height Weight   05/30/24 1426 139/75 98.2 °F (36.8 °C) Oral 96 16 -- -- --   05/30/24 1338 -- -- -- -- -- -- 1.575 m (5' 2.01\") 71.2 kg (157 lb)   05/30/24 1032 134/80 98.2 °F (36.8 °C) Oral 99 18 -- -- --   05/30/24 0715 119/88 98.5 °F (36.9 °C) Oral 99 14 -- -- --   05/30/24 0250 122/85 98.1 °F (36.7 °C) Oral 84 19 98 % -- --   05/29/24 2315 124/69 98.2 °F (36.8 °C) Oral 88 19 96 % -- --   05/29/24 2104 132/89 -- -- -- -- -- -- --   05/29/24 1925 (!) 144/69 98.1 °F (36.7 °C) Oral 100 17 95 % -- --       No intake or output data in the 24 hours ending 05/30/24 1633     I had a face to face encounter and independently examined this patient on 5/30/2024, as outlined below:  PHYSICAL EXAM:  General: Alert, cooperative  EENT:  EOMI. Anicteric sclerae.  Resp:  CTA bilaterally, no wheezing or rales.  No accessory muscle use  CV:  Regular  rhythm,  No edema  GI:  Soft, Non distended, Non tender.  +Bowel sounds  Neurologic:  Alert and oriented X 3, normal speech,   Psych:   Good insight. Not anxious nor agitated  Skin:  No rashes.  No jaundice    Reviewed most current lab test results and cultures  YES  Reviewed most current radiology test results   YES  Review and summation of old records today    NO  Reviewed patient's current orders and MAR    YES  PMH/SH reviewed - no change compared to H&P    Procedures: see electronic medical records for all procedures/Xrays and details which were not copied into this note but were reviewed prior to creation of Plan.      LABS:  I reviewed today's most current labs and imaging studies.  Pertinent labs include:  Recent Labs     05/28/24  1319 05/29/24  0521   WBC 11.3* 8.9   HGB 14.0 12.5   HCT 39.2 36.9    260     Recent Labs     05/28/24  1319   *   K 4.0      CO2 19*   GLUCOSE 315*   BUN 21*   CREATININE 0.91   CALCIUM 10.3*

## 2024-05-30 NOTE — DIABETES MGMT
[facility]  [facility] FOR DI[facility] HEALTH  DIABETES MANAGEMENT CONSULT    Consulted by Provider for advanced nursing evaluation and care for inpatient blood glucose management.    Evaluation and Action Plan   Diana Hercules is a 63 year old female with new onset  DM. The patient reports a hx of prediabetes.She was admitted for seizure like activity. It is also noted she found her son  in the morning. The patient had a recent admission for a stroke as well. The patient has a current A1c of 11.2%. She will likely require diabetes medication at discharge. She was prescribed Metformin in the past but did not take it. I have started Metformin and glipizide. This will give us some time to determine effectiveness before discharge.    BG's stable today. I recommend continuing on the current oral diabetes medications for now. I suspect the patient can be discharged on an oral diabetes regimen.     Management Rationale Action Plan   Medication   Nutritional needs Using oral diabetes medication Using Metformin and glipizide   Corrective insulin Using medium dose  sensitivity based on weight    Additional orders          Diabetes Discharge Plan   Medication  TBD   Referral  []        Outpatient diabetes education   Additional orders            Initial Presentation   Diana Hercules is a 63 y.o. female admitted  after experiencing uncontrollable shaking of left arm and leg. Weakness. Recent dx of ischemic stroke with hemorrhagic stroke. .  LAB: Glucose 335.   CXR:  CT:  Narrative & Impression  EXAM: CT CODE NEURO HEAD WO CONTRAST     INDICATION: Left upper extremity numbness and tingling starting at noon today.  Recent MCA territory infarct.     COMPARISON: CT head on 2024. MRI brain on 2024..     CONTRAST: None.     TECHNIQUE: Unenhanced CT of the head was performed using 5 mm images. Brain and  bone windows were generated. Coronal and sagittal reformats. CT dose reduction  was achieved through use of a  mild.     CTA head: Subacute infarction in the right posterior MCA territory is again  noted. Benign cystic lesion anterosuperior left cerebellum. No large vessel  occlusion or significant intracranial stenosis. No aneurysm. There are bilateral  posterior communicating arteries. No suspicious enhancing lesions. The dural  venous sinuses are patent.     CT Perfusion: Subacute right posterior MCA infarction. No new perfusion defect.     IMPRESSION:  1. Subacute right posterior MCA infarction.  2. No large vessel occlusion.  3. No hemodynamically significant extracranial ICA stenosis (using NASCET  criteria).  4. Mild emphysema.  HX:   Past Medical History:   Diagnosis Date    BMI 33.0-33.9,adult 1/29/2018    Cardiomegaly - hypertensive 6/4/2015    Family history of sarcoidosis 6/4/2015    HTN (hypertension) 3/5/14 notes    norma disla notes rec'd    Hypercholesteremia 9/24/2014    Hypothyroidism 10/8/2014    Irregular heart beat 9/24/2014    Midline thoracic back pain 6/4/2015    MVP (mitral valve prolapse) 9/24/2014    Prediabetes 9/24/2014    Screen for colon cancer 1/29/2018    Thyroid disease     Vitamin D deficiency 9/24/2014        INITIAL DX: Breakthrough seizure (HCC) [G40.919]  Cerebrovascular accident (CVA) due to thrombosis of right middle cerebral artery (HCC) [I63.311]     Current Treatment     TX: ASA. Lipitor. Insulin. Keppra. Synthroid. Aldactone    Hospital Course   Clinical progress has been complicated by seizure like activity and hyperglycemia.     Diabetes History   Hx of prediabetes. Strong family hx. She has a granddaughter that resides with her. Has a daughter that lives close by. Supportive family. Plan to resign from part time job soon. Independent with ADLs prior to this.         Diabetes Medication History  Diabetes drug class Diabetes drug name Additional Comments   Biguanide  Metformin      Diabetes self-management practices:   New Dx   Social determinants of health impacting diabetes

## 2024-05-30 NOTE — PROGRESS NOTES
Neurology Note    Patient ID:  Diana Hercules  [id number]  63 y.o.  1960      Date of Consultation:  May 30, 2024      Assessment and Plan:    The patient is a pleasant 63-year-old female who presents with recent hospitalization for a right MCA territory stroke who presented today with worsening of the left upper and lower extremity weakness and sensory symptoms.  The patient also did have involuntary convulsions of the left upper extremity.  Symptoms have improved since admission.    Transient worsening of neurological symptoms:  She has returned back to baseline  Brain mri with no new stroke or extension of prior stroke  Most likely recrudescence of stroke symptoms associated with acute traumatic event vs seizure with post ictal status.  Head CT with no acute abnormality.  Recent subacute stroke noted with mild petechial hemorrhage  CTA with no large vessel occlusion  The small degree of hemorrhage has not changed from prior study. will continue with low-dose aspirin for the time being. No dual anti-platelet therapy  Htn: keep sbp < 140.   Echo still pending.  Not performed during last hospital stay. Should be obtained prior to discharge. If abnormal, consider cardiology consult.  The patient should be maintained on telemetry  Dyslipidemia: continue statin therapy.   Goal LDL is less than 70. Updated LDL 90.  Diabetes:  Updated hemoglobin A1c 11.2. needs aggressive glycemic control  PT, OT following    new onset seizure:  Recent acute stroke can be contributing factor  Continue Keppra on 500 mg twice a day.    Full channel eeg with no seizure focus.  Can consider follow up ambulatory eeg monitoring as an outpatient  Will determine at that time if patient needs long term anti-seizure medication    Acute grief:  Patient still struggling with the loss of her son.   Offered spiritual support - she declined.  Incidental finding of a pontine/perimesencephalic epidermoid:  Neurosurgery will follow as an

## 2024-05-30 NOTE — PLAN OF CARE
Problem: Physical Therapy - Adult  Goal: By Discharge: Performs mobility at highest level of function for planned discharge setting.  See evaluation for individualized goals.  Description: FUNCTIONAL STATUS PRIOR TO ADMISSION: Patient was independent and active without use of DME. and The patient  was independent for basic and instrumental ADLs.    HOME SUPPORT PRIOR TO ADMISSION: The patient lived with son and grand child but did not require assistance. The patient found her son  at home the day of her admission and has acute grief and anxiety at this time.    Physical Therapy Goals  Initiated 2024  1.  Patient will move from supine to sit and sit to supine, scoot up and down, and roll side to side in bed with independence within 7 day(s).    2.  Patient will perform sit to stand with independence within 7 day(s).  3.  Patient will transfer from bed to chair and chair to bed with independence using the least restrictive device within 7 day(s).  4.  Patient will ambulate with independence for 250 feet with the least restrictive device within 7 day(s).   5.  Patient will ascend/descend 4 stairs with 1 handrail(s) with modified independence within 7 day(s).    Outcome: Adequate for Discharge   PHYSICAL THERAPY TREATMENT/DISCHARGE    Patient: Diana Hercules (63 y.o. female)  Date: 2024  Diagnosis: Breakthrough seizure (HCC) [G40.919]  Cerebrovascular accident (CVA) due to thrombosis of right middle cerebral artery (HCC) [I63.311] New onset seizure (HCC)      Precautions: Up as Tolerated, Fall Risk                      ASSESSMENT:  Patient has been followed by skilled PT services and has progressed towards goals. Pt received sitting in bed, agreeable to participate in therapy. Pt mobilizing at baseline and demonstrates no limitations with mobility. Pt does not require any acute PT needs or needs at discharge. Pt left sitting in bed, all needs within reach and in NAD.        PLAN:  Maximum therapeutic

## 2024-05-30 NOTE — PROGRESS NOTES
Physical Therapy:    Attempted to see this pt for PT session. Pt is resting comfortably in bed politely declining therapy at this time. Noted to be no recliner chair in the room and offered to find one for patient however pt stating she has been able to sit on the EOB when needed/desired. Will defer and continue to follow as appropriate. Thanks.    Sue Holloway, PTA

## 2024-05-31 VITALS
DIASTOLIC BLOOD PRESSURE: 88 MMHG | TEMPERATURE: 98.5 F | HEIGHT: 62 IN | HEART RATE: 91 BPM | BODY MASS INDEX: 28.89 KG/M2 | WEIGHT: 157 LBS | OXYGEN SATURATION: 98 % | SYSTOLIC BLOOD PRESSURE: 128 MMHG | RESPIRATION RATE: 16 BRPM

## 2024-05-31 PROBLEM — E11.65 TYPE 2 DIABETES MELLITUS WITH HYPERGLYCEMIA, WITHOUT LONG-TERM CURRENT USE OF INSULIN (HCC): Status: ACTIVE | Noted: 2022-05-27

## 2024-05-31 LAB
GLUCOSE BLD STRIP.AUTO-MCNC: 112 MG/DL (ref 65–117)
GLUCOSE BLD STRIP.AUTO-MCNC: 146 MG/DL (ref 65–117)
GLUCOSE BLD STRIP.AUTO-MCNC: 187 MG/DL (ref 65–117)
SERVICE CMNT-IMP: ABNORMAL
SERVICE CMNT-IMP: ABNORMAL
SERVICE CMNT-IMP: NORMAL

## 2024-05-31 PROCEDURE — 99232 SBSQ HOSP IP/OBS MODERATE 35: CPT | Performed by: CLINICAL NURSE SPECIALIST

## 2024-05-31 PROCEDURE — 82962 GLUCOSE BLOOD TEST: CPT

## 2024-05-31 PROCEDURE — 6370000000 HC RX 637 (ALT 250 FOR IP): Performed by: CLINICAL NURSE SPECIALIST

## 2024-05-31 PROCEDURE — 6370000000 HC RX 637 (ALT 250 FOR IP): Performed by: PSYCHIATRY & NEUROLOGY

## 2024-05-31 PROCEDURE — 6370000000 HC RX 637 (ALT 250 FOR IP)

## 2024-05-31 PROCEDURE — 6370000000 HC RX 637 (ALT 250 FOR IP): Performed by: STUDENT IN AN ORGANIZED HEALTH CARE EDUCATION/TRAINING PROGRAM

## 2024-05-31 PROCEDURE — 2580000003 HC RX 258: Performed by: STUDENT IN AN ORGANIZED HEALTH CARE EDUCATION/TRAINING PROGRAM

## 2024-05-31 RX ORDER — METFORMIN HYDROCHLORIDE 500 MG/1
500 TABLET, EXTENDED RELEASE ORAL 2 TIMES DAILY WITH MEALS
Status: DISCONTINUED | OUTPATIENT
Start: 2024-05-31 | End: 2024-05-31 | Stop reason: HOSPADM

## 2024-05-31 RX ORDER — LEVETIRACETAM 500 MG/1
500 TABLET ORAL 2 TIMES DAILY
Qty: 120 TABLET | Refills: 5 | Status: SHIPPED | OUTPATIENT
Start: 2024-05-31

## 2024-05-31 RX ORDER — GLIPIZIDE 5 MG/1
5 TABLET ORAL
Qty: 60 TABLET | Refills: 5 | Status: SHIPPED | OUTPATIENT
Start: 2024-06-01

## 2024-05-31 RX ORDER — GLIPIZIDE 5 MG/1
5 TABLET ORAL
Status: DISCONTINUED | OUTPATIENT
Start: 2024-05-31 | End: 2024-05-31 | Stop reason: HOSPADM

## 2024-05-31 RX ORDER — METFORMIN HYDROCHLORIDE 500 MG/1
500 TABLET, EXTENDED RELEASE ORAL 2 TIMES DAILY WITH MEALS
Qty: 60 TABLET | Refills: 5 | Status: SHIPPED | OUTPATIENT
Start: 2024-05-31

## 2024-05-31 RX ADMIN — METFORMIN HYDROCHLORIDE 500 MG: 500 TABLET, EXTENDED RELEASE ORAL at 08:31

## 2024-05-31 RX ADMIN — METFORMIN HYDROCHLORIDE 500 MG: 500 TABLET, EXTENDED RELEASE ORAL at 18:05

## 2024-05-31 RX ADMIN — ASPIRIN 81 MG: 81 TABLET, CHEWABLE ORAL at 08:32

## 2024-05-31 RX ADMIN — LEVETIRACETAM 500 MG: 500 TABLET, FILM COATED ORAL at 08:31

## 2024-05-31 RX ADMIN — LEVOTHYROXINE SODIUM 25 MCG: 0.03 TABLET ORAL at 06:25

## 2024-05-31 RX ADMIN — SODIUM CHLORIDE, PRESERVATIVE FREE 10 ML: 5 INJECTION INTRAVENOUS at 08:32

## 2024-05-31 RX ADMIN — SPIRONOLACTONE 50 MG: 25 TABLET ORAL at 08:31

## 2024-05-31 RX ADMIN — GLIPIZIDE 5 MG: 5 TABLET ORAL at 06:25

## 2024-05-31 RX ADMIN — GLIPIZIDE 5 MG: 5 TABLET ORAL at 15:38

## 2024-05-31 ASSESSMENT — PAIN SCALES - GENERAL: PAINLEVEL_OUTOF10: 0

## 2024-05-31 NOTE — DISCHARGE SUMMARY
Hospitalist Discharge Note    NAME:   Diana Hercules   : 1960   MRN: [id number]     Admit date: 2024    Discharge date: 24    PCP: Chucho Barrientos MD    Discharge Diagnoses:    Suspected seizure POA    Worsening of the left upper and lower extremity weakness and sensory symptoms     Recent right MCA territory stroke  with mild petechial hemorrhage on     Acute grief      DM with HbA1c 11.2     Essential HTN     Hypertensive cardiomyopathy     Mitral valve prolapse      Hypothyroidism    Code Status: Full     Discharge Medications:  Current Discharge Medication List        START taking these medications    Details   levETIRAcetam (KEPPRA) 500 MG tablet Take 1 tablet by mouth 2 times daily  Qty: 120 tablet, Refills: 5      glipiZIDE (GLUCOTROL) 5 MG tablet Take 1 tablet by mouth 2 times daily (before meals)  Qty: 60 tablet, Refills: 5      metFORMIN (GLUCOPHAGE-XR) 500 MG extended release tablet Take 1 tablet by mouth 2 times daily (with meals)  Qty: 60 tablet, Refills: 5           CONTINUE these medications which have NOT CHANGED    Details   aspirin 81 MG chewable tablet Take 1 tablet by mouth daily  Qty: 30 tablet, Refills: 3      atorvastatin (LIPITOR) 20 MG tablet Take 1 tablet by mouth nightly  Qty: 30 tablet, Refills: 3      UNABLE TO FIND Physical therapy  Qty: 1 each, Refills: 0      levothyroxine (SYNTHROID) 25 MCG tablet TAKE 1 AND 1/2 TABLET BY MOUTH DAILY BEFORE BREAKFAST  Qty: 135 tablet, Refills: 1    Associated Diagnoses: Hypothyroidism, unspecified; Secondary hypertension, unspecified      spironolactone (ALDACTONE) 50 MG tablet Take 1 tablet by mouth 2 times daily  Qty: 180 tablet, Refills: 3    Associated Diagnoses: Secondary hypertension, unspecified           STOP taking these medications       metFORMIN (GLUCOPHAGE) 1000 MG tablet Comments:   Reason for Stopping:             Felt to have a seizure in the setting of a recent stroke    Continue your stroke medications  05/29/24  0521   WBC 8.9   HGB 12.5   HCT 36.9          No results for input(s): \"NA\", \"K\", \"CL\", \"CO2\", \"GLUCOSE\", \"BUN\", \"CREATININE\", \"CALCIUM\", \"MG\", \"PHOS\", \"LABALBU\", \"BILITOT\", \"AST\", \"ALT\", \"INR\" in the last 72 hours.      Signed: Surendra Bruner Jr, MD

## 2024-05-31 NOTE — PROGRESS NOTES
Pt AOX4, able to make needs known. No seizure activity noted this shift. Pt denied pain, SOB, dizziness and discomfort. Pt has order to discharge home. Discharge instructions given, pt verbalized understanding. IV removed, cathete intact. No redness or swelling noted. All belongings sent home with pt. Pt accompanied home by daughter.

## 2024-05-31 NOTE — DIABETES MGMT
[facility]  PROGRAM FOR DIABETES HEALTH  DIABETES MANAGEMENT CONSULT    Consulted by Provider for advanced nursing evaluation and care for inpatient blood glucose management.    Evaluation and Action Plan   Diana Hercules is a 63 year old female with cardiomegaly, HTN, hypothyroidism and pre-diabetes who was admitted with uncontrollable shaking of her left arm and leg with sensory changes and weakness.  She was discharged the day prior after a hospitalization with acute ischemic stroke with mild hemorrhagic transformation.  Her brain MRI this admission with without new stroke or extension of prior stroke. recrudescence of stroke symptoms associated with acute traumatic event vs seizure with post ictal status.  The [facility] has been consulted to assist in glycemic management and advanced diabetes management assessment this admission.    Ms Hercules has actually had Type 2 Diabetes since 2/11/20 with an A1C of 6.5% for which her PCP notes she attempted lifestyle control.  A1C was repeated 4/6/22 at 10.9% and again 1/29/24.  She tells me she never knew she had diabetes.  I see she was prescribed metformin 1000mg BID in February and a pharmacy claim went through but she again tells me she never picked this up or started it.  She works full time in a sedentary job.  She has three meals daily, moderate in carb intake and a glass of wine at night.      This admission, glucose was significantly elevated at 335 on admission. 500mg metformin and 5mg glipzide daily has started.  24h glucose pattern has been 104-235.  Will advance oral agents. Agree that she needs aggressive glycemic control.    Action Plan    Increasing metformin to 500mg BID  Increasing glipizide to 5mg BID  Carb consistent diet       Diabetes Discharge Plan   Medication  A1C 11.2%. Needs aggressive glycemic control.  Start metformin 500mg BID  Start Glipizide 5mg BID    Evidence based guidelines support starting an GLP-1 for any person with  personally reviewed the hospitalization record, including notes, laboratory & diagnostic data and current medications, and examined the patient at the bedside.  Total minutes: 40    LAURENCE Andrade - CNS  Diabetes Clinical Nurse Specialist  [facility]  Access via [employer]

## 2024-05-31 NOTE — PLAN OF CARE
Problem: Discharge Planning  Goal: Discharge to home or other facility with appropriate resources  5/31/2024 0833 by Sharon Apodaca RN  Outcome: Progressing  5/31/2024 0017 by Murray Bear RN  Outcome: Progressing     Problem: Safety - Adult  Goal: Free from fall injury  5/31/2024 0833 by Sharon Apodaca RN  Outcome: Progressing  5/31/2024 0017 by Murray Bear RN  Outcome: Progressing     Problem: ABCDS Injury Assessment  Goal: Absence of physical injury  5/31/2024 0833 by Sharon Apodaca RN  Outcome: Progressing  5/31/2024 0017 by Murray Bear RN  Outcome: Progressing     Problem: Chronic Conditions and Co-morbidities  Goal: Patient's chronic conditions and co-morbidity symptoms are monitored and maintained or improved  5/31/2024 0833 by Sharon Apodaca RN  Outcome: Progressing  5/31/2024 0017 by Murray Bear RN  Outcome: Progressing

## 2024-05-31 NOTE — PROGRESS NOTES
Attempted to schedule PCP hospital follow up appointment. Unable to reach anyone, unable to leave voicemail. [facility] placed [facility] information AVS for patient resource.  Pending patient discharge. Katarzyna Grajeda, Care Management Assistant

## 2024-05-31 NOTE — DISCHARGE INSTRUCTIONS
Felt to have a seizure in the setting of a recent stroke    Continue your stroke medications as before    Continue metformin for the diabetes, second medicine glipizide was added for better control     Check blood sugars before meals and bedtime at least 2 times per day   Call MD if blood sugars less than 75 or greater than 400   Records of blood sugars for your PCP review at follow-up    Keppra is a medicine to prevent seizures, take as directed till stopped by neurology.    Per [place] state law no driving a car or truck or motorcycle until you are seizure-free for 6 months     If she had a seizure or unresponsive episode while driving, it could potentially cause an accident     Do not resume driving till cleared by neurology     Avoid swimming and do not take baths,  in case you went unresponsive, you could potentially drown    Showers are okay

## 2024-05-31 NOTE — PLAN OF CARE
Problem: Discharge Planning  Goal: Discharge to home or other facility with appropriate resources  2024 0017 by Murray Bear RN  Outcome: Progressing  2024 1049 by Dennis Garcia RN  Outcome: Progressing     Problem: Safety - Adult  Goal: Free from fall injury  Outcome: Progressing     Problem: ABCDS Injury Assessment  Goal: Absence of physical injury  Outcome: Progressing     Problem: Chronic Conditions and Co-morbidities  Goal: Patient's chronic conditions and co-morbidity symptoms are monitored and maintained or improved  Outcome: Progressing     Problem: Physical Therapy - Adult  Goal: By Discharge: Performs mobility at highest level of function for planned discharge setting.  See evaluation for individualized goals.  Description: FUNCTIONAL STATUS PRIOR TO ADMISSION: Patient was independent and active without use of DME. and The patient  was independent for basic and instrumental ADLs.    HOME SUPPORT PRIOR TO ADMISSION: The patient lived with son and grand child but did not require assistance. The patient found her son  at home the day of her admission and has acute grief and anxiety at this time.    Physical Therapy Goals  Initiated 2024  1.  Patient will move from supine to sit and sit to supine, scoot up and down, and roll side to side in bed with independence within 7 day(s).    2.  Patient will perform sit to stand with independence within 7 day(s).  3.  Patient will transfer from bed to chair and chair to bed with independence using the least restrictive device within 7 day(s).  4.  Patient will ambulate with independence for 250 feet with the least restrictive device within 7 day(s).   5.  Patient will ascend/descend 4 stairs with 1 handrail(s) with modified independence within 7 day(s).    2024 1506 by Sue Holloway PTA  Outcome: Adequate for Discharge

## 2024-05-31 NOTE — PROGRESS NOTES
Spiritual Care Assessment/Progress Note  [facility]    Name: Diana Hercules MRN: [id number]    Age: 63 y.o.     Sex: female   Language: English     Date: 5/31/2024            Total Time Calculated: 13 min              Spiritual Assessment begun in [facility] CARDIOPULMONARY CARE  Service Provided For: Patient  Referral/Consult From: Nurse  Encounter Overview/Reason: Spiritual/Emotional Needs    Spiritual beliefs:      [] Involved in a roque tradition/spiritual practice:      [] Supported by a roque community:      [] Claims no spiritual orientation:      [] Seeking spiritual identity:           [] Adheres to an individual form of spirituality:      [] Not able to assess:                Identified resources for coping and support system:   Support System: Unknown       [] Prayer                  [] Devotional reading               [] Music                  [] Guided Imagery     [] Pet visits                                        [] Other: (COMMENT)     Specific area/focus of visit   Encounter:    Crisis:    Spiritual/Emotional needs: Type: Spiritual Support  Ritual, Rites and Sacraments:    Grief, Loss, and Adjustments: Type: Grief and loss  Ethics/Mediation:    Behavioral Health:    Palliative Care:    Advance Care Planning:      Plan/Referrals: Continue Support (comment)    Narrative:   Reviewed chart. Stepped into room and inquired if patient would like a visit. She responded that she did not need a visit now. She thinks she may be able to leave the hospital soon. (She did share that she recently lost her son. It was evident that she is still processing all of this. She shared that he is too young.) I expressed deep sympathy and compassion for her. And I stepped out of the room. She wanted to be alone today.     JEANETTE Bateman.  PRN    paging service [phone]

## 2024-06-03 NOTE — ADT AUTH CERT
morning of presentation  -Continue with emotional support      DM with HbA1c 11.2   -Started on Insulin glargine and lispro  Resumed metformin, glipizide added by diabetes team     HTN   Hypertensive cardiomyopathy   Mitral valve prolapse   BP goal <140   Continue with aldactone   Labetalol prn to maintain BP   Sinus tachycardia -continue to monitor      Hypothyroidism  -Continue with Levothyroxine      Medical Decision Making:   I personally reviewed labs: Yes   I personally reviewed imaging:Yes   I personally reviewed EKG:Sinus tachycardia   Toxic drug monitoring: Keppra   Discussed case with: RN,      Code Status: Full   DVT Prophylaxis: SCD  Baseline: Independent      Subjective:      Chief Complaint / Reason for Physician Visit  Seen and evaluated at the bedside for worsening symptoms  No new symptoms     Objective:      VITALS:   Last 24hrs VS reviewed since prior progress note. Most recent are:  Patient Vitals for the past 24 hrs:    BP Temp Temp src Pulse Resp SpO2 Height Weight   05/30/24 1426 139/75 98.2 °F (36.8 °C) Oral 96 16 -- -- --   05/30/24 1338 -- -- -- -- -- -- 1.575 m (5' 2.01\") 71.2 kg (157 lb)   05/30/24 1032 134/80 98.2 °F (36.8 °C) Oral 99 18 -- -- --   05/30/24 0715 119/88 98.5 °F (36.9 °C) Oral 99 14 -- -- --   05/30/24 0250 122/85 98.1 °F (36.7 °C) Oral 84 19 98 % -- --   05/29/24 2315 124/69 98.2 °F (36.8 °C) Oral 88 19 96 % -- --   05/29/24 2104 132/89 -- -- -- -- -- -- --   05/29/24 1925 (!) 144/69 98.1 °F (36.7 °C) Oral 100 17 95 % -- --         No intake or output data in the 24 hours ending 05/30/24 1633      I had a face to face encounter and independently examined this patient on 5/30/2024, as outlined below:  PHYSICAL EXAM:  General:          Alert, cooperative  EENT:              EOMI. Anicteric sclerae.  Resp:               CTA bilaterally, no wheezing or rales.  No accessory muscle use  CV:                  Regular  rhythm,  No edema  GI:                   Soft, Non

## 2024-06-04 NOTE — PROGRESS NOTES
[facility]  [facility]  [address]  [address]  [address]  [facility]  [address]  [phone] (phone)   [phone] (fax)  Hospital Follow-up / Tele-health Visit      Date:  2024    Name:  NIKI SCOTT  :  1960  MRN:  [id number]     PCP:  Chucho Barrientos MD    Niki Scott is a 63 y.o. female who was seen by synchronous (real-time) audio-video technology on 2024 for Follow-Up from Hospital    Assessment & Plan:   1. History of stroke  Assessment & Plan:  Patient with a history of hypertension,hypertensive cardiomyopathy, mitral valve prolapse, hyperlipidemia, poorly controlled diabetes hemoglobin A1c 11.2 who was admitted to the hospital - with an ischemic stroke with mild hemorrhagic transformation.  Patient was readmitted - with worsening left upper left lower extremity weakness and sensory symptoms.  The patient also had an involuntary convulsion of the left upper extremity.She was placed on Keppra 500 mg twice a day    -CTA head and neck no large vessel occlusion, no hemodynamic significant ICA stenosis, mild emphysema  -MRI Brain: Evolving subacute right posterior MCA infarct with cortical petechial hemorrhage, no new areas of acute infarction.  Stable left posterior fossa epidermoid cyst  -MRI brain: Evolving subacute right posterior MCA infarct with cortical fatigue no hemorrhage.  Stable left posterior fossa epidermoid cyst.  -Bilateral carotid ultrasounds mild stenosis with heterogeneous plaque in the right internal carotid artery.  No stenosis in the left internal carotid artery  -TTE: Ejection fraction 55 to 60%, left ventricular size normal wall thickness  -EEG: Clinical Interpretation: This EEG, performed during wakefulness and drowsiness, is normal.  -Total cholesterol 116, LDL 54.8 TSH 0.47, free T41.2    Ms. Scott is seen in hospital follow-up today.  She is doing well, no symptoms to suggest recurrent stroke.  She

## 2024-06-05 ENCOUNTER — TELEMEDICINE (OUTPATIENT)
Age: 64
End: 2024-06-05
Payer: COMMERCIAL

## 2024-06-05 DIAGNOSIS — Z86.73 HISTORY OF STROKE: Primary | ICD-10-CM

## 2024-06-05 DIAGNOSIS — R56.9 NEW ONSET SEIZURE (HCC): ICD-10-CM

## 2024-06-05 PROCEDURE — 99214 OFFICE O/P EST MOD 30 MIN: CPT | Performed by: NURSE PRACTITIONER

## 2024-06-05 NOTE — PROGRESS NOTES
Since discharge patient said she has been OK  Would like to discuss the keppra   Would like to discuss driving   PT not started, patient said she doesn't have time to do this.

## 2024-06-06 PROBLEM — I63.311 CEREBROVASCULAR ACCIDENT (CVA) DUE TO THROMBOSIS OF RIGHT MIDDLE CEREBRAL ARTERY (HCC): Status: RESOLVED | Noted: 2024-05-26 | Resolved: 2024-06-06

## 2024-06-06 PROBLEM — Z86.73 HISTORY OF STROKE: Status: ACTIVE | Noted: 2024-06-06

## 2024-06-06 NOTE — ASSESSMENT & PLAN NOTE
Patient with a history of hypertension,hypertensive cardiomyopathy, mitral valve prolapse, hyperlipidemia, poorly controlled diabetes hemoglobin A1c 11.2 who was admitted to the hospital 5/25-5/27 with an ischemic stroke with mild hemorrhagic transformation.  Patient was readmitted 5/28-5/31 with worsening left upper left lower extremity weakness and sensory symptoms.  The patient also had an involuntary convulsion of the left upper extremity.She was placed on Keppra 500 mg twice a day    -CTA head and neck no large vessel occlusion, no hemodynamic significant ICA stenosis, mild emphysema  -MRI Brain: Evolving subacute right posterior MCA infarct with cortical petechial hemorrhage, no new areas of acute infarction.  Stable left posterior fossa epidermoid cyst  -MRI brain: Evolving subacute right posterior MCA infarct with cortical fatigue no hemorrhage.  Stable left posterior fossa epidermoid cyst.  -Bilateral carotid ultrasounds mild stenosis with heterogeneous plaque in the right internal carotid artery.  No stenosis in the left internal carotid artery  -TTE: Ejection fraction 55 to 60%, left ventricular size normal wall thickness  -EEG: Clinical Interpretation: This EEG, performed during wakefulness and drowsiness, is normal.  -Total cholesterol 116, LDL 54.8 TSH 0.47, free T41.2    Ms. Hercules is seen in hospital follow-up today.  She is doing well, no symptoms to suggest recurrent stroke.  She was surprised she had a stroke because she only had transient sensory and motor deficits of her left upper extremity.    Continue aspirin 81 mg  She is feeling quite poorly due to the number of new medications she has been taking, her LDL is at goal at 54.8.  Will discontinue the Lipitor  She is working with per PCP to manage her diabetes, goal A1c less then 6.5

## 2024-06-06 NOTE — ASSESSMENT & PLAN NOTE
Patient with admitted to the hospital 5/25-5/27 with an ischemic stroke with mild hemorrhagic transformation.  Patient was readmitted 5/28-5/31 with worsening left upper left lower extremity weakness and sensory symptoms.  The patient also had an involuntary convulsion of the left upper extremity.She was placed on Keppra 500 mg twice a day     -MRI Brain: Evolving subacute right posterior MCA infarct with cortical petechial hemorrhage, no new areas of acute infarction.  Stable left posterior fossa epidermoid cyst  -EEG: Clinical Interpretation: This EEG, performed during wakefulness and drowsiness, is normal.    Continue Keppra 500 mg twice daily  Will have follow-up, we will repeat EEG and will discuss risk and benefit of discontinuation of Keppra  Discussed seizure precautions including: no driving for 6 months, avoiding being in or near standing bodies water or open flames without direct supervision, avoiding heights greater than 2 feet.

## 2024-06-19 ENCOUNTER — OFFICE VISIT (OUTPATIENT)
Age: 64
End: 2024-06-19
Payer: COMMERCIAL

## 2024-06-19 VITALS
RESPIRATION RATE: 20 BRPM | OXYGEN SATURATION: 94 % | TEMPERATURE: 97.5 F | DIASTOLIC BLOOD PRESSURE: 96 MMHG | HEART RATE: 96 BPM | SYSTOLIC BLOOD PRESSURE: 154 MMHG

## 2024-06-19 DIAGNOSIS — I15.9 SECONDARY HYPERTENSION, UNSPECIFIED: ICD-10-CM

## 2024-06-19 DIAGNOSIS — E26.02: Primary | ICD-10-CM

## 2024-06-19 DIAGNOSIS — E11.8 TYPE 2 DIABETES MELLITUS WITH UNSPECIFIED COMPLICATIONS (HCC): ICD-10-CM

## 2024-06-19 DIAGNOSIS — R56.9 NEW ONSET SEIZURE (HCC): ICD-10-CM

## 2024-06-19 LAB — HBA1C MFR BLD: 8.3 %

## 2024-06-19 PROCEDURE — 83036 HEMOGLOBIN GLYCOSYLATED A1C: CPT | Performed by: FAMILY MEDICINE

## 2024-06-19 PROCEDURE — 3077F SYST BP >= 140 MM HG: CPT | Performed by: FAMILY MEDICINE

## 2024-06-19 PROCEDURE — 3046F HEMOGLOBIN A1C LEVEL >9.0%: CPT | Performed by: FAMILY MEDICINE

## 2024-06-19 PROCEDURE — 3080F DIAST BP >= 90 MM HG: CPT | Performed by: FAMILY MEDICINE

## 2024-06-19 PROCEDURE — 99214 OFFICE O/P EST MOD 30 MIN: CPT | Performed by: FAMILY MEDICINE

## 2024-06-19 RX ORDER — METFORMIN HYDROCHLORIDE 500 MG/1
500 TABLET, EXTENDED RELEASE ORAL 2 TIMES DAILY WITH MEALS
Qty: 60 TABLET | Refills: 5
Start: 2024-06-19

## 2024-06-19 RX ORDER — GLIPIZIDE 5 MG/1
5 TABLET ORAL
Qty: 60 TABLET | Refills: 5
Start: 2024-06-19

## 2024-06-19 RX ORDER — SPIRONOLACTONE 50 MG/1
50 TABLET, FILM COATED ORAL 3 TIMES DAILY
Qty: 90 TABLET | Refills: 3
Start: 2024-06-19

## 2024-06-19 RX ORDER — LEVETIRACETAM 500 MG/1
500 TABLET ORAL 2 TIMES DAILY
Qty: 120 TABLET | Refills: 5
Start: 2024-06-19

## 2024-06-19 ASSESSMENT — PATIENT HEALTH QUESTIONNAIRE - PHQ9
SUM OF ALL RESPONSES TO PHQ QUESTIONS 1-9: 2
SUM OF ALL RESPONSES TO PHQ9 QUESTIONS 1 & 2: 2
SUM OF ALL RESPONSES TO PHQ QUESTIONS 1-9: 2
SUM OF ALL RESPONSES TO PHQ QUESTIONS 1-9: 2
1. LITTLE INTEREST OR PLEASURE IN DOING THINGS: SEVERAL DAYS
SUM OF ALL RESPONSES TO PHQ QUESTIONS 1-9: 2
2. FEELING DOWN, DEPRESSED OR HOPELESS: SEVERAL DAYS

## 2024-06-19 NOTE — PROGRESS NOTES
Chief Complaint   Patient presents with    Follow-Up from Hospital     D/c 24    grief of child       \"Have you been to the ER, urgent care clinic since your last visit?  Hospitalized since your last visit?\"    NO    “Have you seen or consulted any other health care providers outside of [facility] since your last visit?”    NO    “Have you had a colorectal cancer screening such as a colonoscopy/FIT/Cologuard?    NO    No colonoscopy on file  No cologuard on file  No FIT/FOBT on file   No flexible sigmoidoscopy on file        Results for orders placed or performed in visit on 24   AMB POC HEMOGLOBIN A1C   Result Value Ref Range    Hemoglobin A1C, POC 8.3 %         Vitals:    24 1545 24 1547   BP: (!) 161/106 (!) 159/113   Site: Left Upper Arm Right Upper Arm   Position: Sitting Sitting   Cuff Size: Large Adult Large Adult   Pulse: 96    Resp: 20    Temp: 97.5 °F (36.4 °C)    TempSrc: Infrared    SpO2: 94%            Health Maintenance Due   Topic Date Due    Diabetic retinal exam  Never done    Colorectal Cancer Screen  Never done        The patient, Diana Hercules, identity was verified by name and

## 2024-06-19 NOTE — PROGRESS NOTES
Diana Hercules (:  1960) is a 63 y.o. female,Established patient, here for evaluation of the following chief complaint(s):  Follow-Up from Hospital (D/c 24) and grief of child  Patient presented with a devastating loss after she was told that she has been diagnosed with transient ischemic attack she does go home and following her son passed away stating that she is getting autopsy of unknown the result since then she has been feeling depressed sad crying counseling offered pharmacotherapy offered today but patient is opted patient is also willing to go back to work to become more socialized, denies suicidal homicidal ideation, who present for f/u regarding the diabetic state, and bp check, currently taking 50 mg of spironolactone twice daily denies chest pain shortness of breath patient has been compliant with diabetic meds since last visit and is trying to have a diabetic diet, no Rf needed for today, patient currently denies tingling sensation, has no polyurea and polydipsia, last a1c was not at target, but has improved from 11.2 to almost 8 percentile,  Constitutional: no fever, abnl  energy levels, nl sleep patterns, nl appetite, and nl weight fluctuations,  - exercise habits,  nad     HENT: no ear pain or nosebleeds. No blurred vision  Respiratory: no shortness of breath, wheezing cough   Cardiovascular: Has no chest pain, ,and racing heart .   Gastrointestinal: No constipation, diarrhea, nausea and vomiting.   Genitourinary: No frequency.   Musculoskeletal: Negative for joint pain.   Skin: no itching, no rash.   Neurological: Negative for dizziness, no tremors  Psychiatric/Behavioral: no for depression  no nervous/anxious, nl stress levels, and overall emotional well-being .        Constitutional: Well developed, well nourished.  non-toxic in appearance, not diaphoretic.   HEENT: PERRL. EOMI. The left TM is unremarkable. The right TM is unremarkable. No nasal  erythema noted.  THROAT: Posterior

## 2024-07-08 ENCOUNTER — TELEMEDICINE (OUTPATIENT)
Age: 64
End: 2024-07-08
Payer: COMMERCIAL

## 2024-07-08 DIAGNOSIS — Z86.73 HISTORY OF STROKE: Primary | ICD-10-CM

## 2024-07-08 DIAGNOSIS — R56.9 NEW ONSET SEIZURE (HCC): ICD-10-CM

## 2024-07-08 DIAGNOSIS — I65.21 CAROTID STENOSIS, ASYMPTOMATIC, RIGHT: ICD-10-CM

## 2024-07-08 PROCEDURE — 99215 OFFICE O/P EST HI 40 MIN: CPT | Performed by: NURSE PRACTITIONER

## 2024-07-08 RX ORDER — ATORVASTATIN CALCIUM 20 MG/1
20 TABLET, FILM COATED ORAL NIGHTLY
COMMUNITY
Start: 2024-06-22

## 2024-07-08 NOTE — PROGRESS NOTES
[facility]  [facility]  [address]  [address]  [address]  [facility]  [address]  [phone] (phone)   [phone] (fax)  Hospital Follow-up / Tele-health Visit      Date:  24       Name:  NIKI SCOTT  :  1960  MRN:  [id number]     PCP:  Chucho Barrientos MD    Niki Scott is a 63 y.o. female who was seen by synchronous (real-time) audio-video technology on 2024 for Follow-up (Stroke/seizure)    Assessment & Plan:   1. History of stroke  Assessment & Plan:  Patient with a history of hypertension,hypertensive cardiomyopathy, mitral valve prolapse, hyperlipidemia, diabetes (hemoglobin A1c 8.3), right MCA stroke (2024), and left upper extremity focal motor seizure in the setting of acute stroke without loss of or altered level of consciousness.     -CTA head and neck no large vessel occlusion, no hemodynamic significant ICA stenosis, mild emphysema  -MRI Brain: Evolving subacute right posterior MCA infarct with cortical petechial hemorrhage, no new areas of acute infarction.  Stable left posterior fossa epidermoid cyst  -MRI brain: Evolving subacute right posterior MCA infarct with cortical fatigue no hemorrhage.  Stable left posterior fossa epidermoid cyst.  -Bilateral carotid ultrasounds mild stenosis with heterogeneous plaque in the right internal carotid artery.  No stenosis in the left internal carotid artery  -TTE: Ejection fraction 55 to 60%, left ventricular size normal wall thickness  -Total cholesterol 116, LDL 54.8 TSH 0.47, free T4 1.2     Ms. Scott is seen in hospital follow-up today.  She is doing well, no symptoms to suggest recurrent stroke.  She was surprised she had a stroke because she only had transient sensory and motor deficits of her left upper extremity.     Continue aspirin 81 mg  Continue Lipitor 20 mg  She continues to work with her PCP to manage her other risk factors such as her diabetes (her hemoglobin A1c is now down

## 2024-07-08 NOTE — ASSESSMENT & PLAN NOTE
5/2024: Bilateral carotid ultrasounds mild stenosis with heterogeneous plaque in the right internal carotid artery.  No stenosis in the left internal carotid artery    Continue aspirin 81 mg and Lipitor 20 mg  Yearly carotid ultrasounds  Management of risk factors (including diabetes and hypertension) per patient's PCP

## 2024-07-08 NOTE — ASSESSMENT & PLAN NOTE
Patient with a history of hypertension,hypertensive cardiomyopathy, mitral valve prolapse, hyperlipidemia, diabetes (hemoglobin A1c 8.3), right MCA stroke (5/2024), and left upper extremity focal motor seizure in the setting of acute stroke without loss of or altered level of consciousness.     -CTA head and neck no large vessel occlusion, no hemodynamic significant ICA stenosis, mild emphysema  -MRI Brain: Evolving subacute right posterior MCA infarct with cortical petechial hemorrhage, no new areas of acute infarction.  Stable left posterior fossa epidermoid cyst  -MRI brain: Evolving subacute right posterior MCA infarct with cortical fatigue no hemorrhage.  Stable left posterior fossa epidermoid cyst.  -Bilateral carotid ultrasounds mild stenosis with heterogeneous plaque in the right internal carotid artery.  No stenosis in the left internal carotid artery  -TTE: Ejection fraction 55 to 60%, left ventricular size normal wall thickness  -Total cholesterol 116, LDL 54.8 TSH 0.47, free T4 1.2     Ms. Diomedes is seen in hospital follow-up today.  She is doing well, no symptoms to suggest recurrent stroke.  She was surprised she had a stroke because she only had transient sensory and motor deficits of her left upper extremity.     Continue aspirin 81 mg  Continue Lipitor 20 mg  She continues to work with her PCP to manage her other risk factors such as her diabetes (her hemoglobin A1c is now down to 8.3) and her hypertension.

## 2024-07-08 NOTE — ASSESSMENT & PLAN NOTE
Patient with admitted to the hospital 5/25-5/27 with an ischemic stroke with mild hemorrhagic transformation.  Patient was readmitted 5/28-5/31 with worsening left upper left lower extremity weakness and sensory symptoms.  The patient also had an involuntary convulsion of the left upper extremity without loss of or altered level of consciousness. She was placed on Keppra 500 mg twice a day.  She states she feels the symptoms you are having were related to the death of her son.  She would like to wean off of the Keppra.     -MRI Brain: Evolving subacute right posterior MCA infarct with cortical petechial hemorrhage, no new areas of acute infarction.  Stable left posterior fossa epidermoid cyst  -EEG: Clinical Interpretation: This EEG, performed during wakefulness and drowsiness, is normal.    She would like to wean off the Keppra.  We discussed at length risk (recurrent seizure) and benefit she is not on medication she does not need to.  We also discussed that it would not be inappropriate to wean her off Keppra as she had the symptoms in the setting of acute stroke and she was grieving due to she found her son dead on the day of hospital presentation.  I recommended to her to decrease her Keppra to 1 tablet at night for 7 days and then discontinue  Will schedule follow-up in early August, we will repeat EEG at that time to assess for electrographic discharges.

## 2024-07-09 ENCOUNTER — TELEPHONE (OUTPATIENT)
Age: 64
End: 2024-07-09

## 2024-07-09 NOTE — TELEPHONE ENCOUNTER
Pls schedule carotid doppler in office on pt    Forwarding to EDITH Archibald, to call pt and schedule

## 2024-07-10 ENCOUNTER — TELEPHONE (OUTPATIENT)
Age: 64
End: 2024-07-10

## 2024-08-19 ENCOUNTER — TELEMEDICINE (OUTPATIENT)
Age: 64
End: 2024-08-19
Payer: COMMERCIAL

## 2024-08-19 DIAGNOSIS — I65.21 CAROTID STENOSIS, ASYMPTOMATIC, RIGHT: ICD-10-CM

## 2024-08-19 DIAGNOSIS — Z86.73 HISTORY OF STROKE: ICD-10-CM

## 2024-08-19 DIAGNOSIS — R56.9 NEW ONSET SEIZURE (HCC): Primary | ICD-10-CM

## 2024-08-19 PROBLEM — R53.1 ACUTE LEFT-SIDED WEAKNESS: Status: RESOLVED | Noted: 2024-05-26 | Resolved: 2024-08-19

## 2024-08-19 PROBLEM — G45.9 TIA (TRANSIENT ISCHEMIC ATTACK): Status: RESOLVED | Noted: 2024-05-29 | Resolved: 2024-08-19

## 2024-08-19 PROCEDURE — 99212 OFFICE O/P EST SF 10 MIN: CPT | Performed by: NURSE PRACTITIONER

## 2024-08-19 NOTE — ASSESSMENT & PLAN NOTE
Patient with admitted to the hospital 5/25-5/27 with an ischemic stroke with mild hemorrhagic transformation.  Patient was readmitted 5/28-5/31 with worsening left upper left lower extremity weakness and sensory symptoms.  The patient also had an involuntary convulsion of the left upper extremity without loss of or altered level of consciousness. She was placed on Keppra 500 mg twice a day.  She states she feels the symptoms you are having were related to the death of her son.  She would like to wean off of the Keppra.    Patient was seen in follow-up today she has been off the Keppra 2 to 3 weeks, she has had no symptoms to suggest clinical or subclinical seizures.     -MRI Brain: Evolving subacute right posterior MCA infarct with cortical petechial hemorrhage, no new areas of acute infarction.  Stable left posterior fossa epidermoid cyst  -EEG: Clinical Interpretation: This EEG, performed during wakefulness and drowsiness, is normal.     Will obtain routine EEG to assess for electrographic discharges now that she is off of the Keppra.

## 2024-08-19 NOTE — ASSESSMENT & PLAN NOTE
Stable: 5/2024: Bilateral carotid ultrasounds mild stenosis with heterogeneous plaque in the right internal carotid artery.  No stenosis in the left internal carotid artery     Continue aspirin 81 mg and Lipitor 20 mg  Repeat yearly carotid ultrasounds in May or June 2025  Management of risk factors (including diabetes and hypertension) per patient's PCP

## 2024-08-19 NOTE — ASSESSMENT & PLAN NOTE
Patient with a history of hypertension,hypertensive cardiomyopathy, mitral valve prolapse, hyperlipidemia, diabetes (hemoglobin A1c 8.3), right MCA stroke (5/2024), and left upper extremity focal motor seizure in the setting of acute stroke without loss of or altered level of consciousness.     -CTA head and neck no large vessel occlusion, no hemodynamic significant ICA stenosis, mild emphysema  -MRI Brain: Evolving subacute right posterior MCA infarct with cortical petechial hemorrhage, no new areas of acute infarction.  Stable left posterior fossa epidermoid cyst  -MRI brain: Evolving subacute right posterior MCA infarct with cortical fatigue no hemorrhage.  Stable left posterior fossa epidermoid cyst.  -Bilateral carotid ultrasounds mild stenosis with heterogeneous plaque in the right internal carotid artery.  No stenosis in the left internal carotid artery  -TTE: Ejection fraction 55 to 60%, left ventricular size normal wall thickness  -Total cholesterol 116, LDL 54.8 TSH 0.47, free T4 1.2     Ms. Diomedes has had no symptoms to suggest recurrent stroke.     Continue aspirin 81 mg  Continue Lipitor 20 mg  She continues to work with her PCP to manage her other risk factors such as her diabetes (her hemoglobin A1c is now down to 8.3) and her hypertension.

## 2024-08-19 NOTE — PROGRESS NOTES
endocrine, skin, SHEENT, genitourinary, psychiatric and neurologic systems was obtained and is unremarkable with the except as stated under HPI    Objective:   Blood pressure cuff not available: Most recent documented vital signs June 19, 2024: Blood pressure 154/96, respirations 20, oxygen saturations 94%    BMI: 28.71 kg/m²    Physical Exam:  General:  Well developed, well nourished, and groomed female in no acute distress.  HEENT: normocephalic  Neck: trach is midline, upon observation neck appears to be supple.  Cardiovascular: Unable to reliably assess due to telehealth visit  Respiratory: Upon observation equal chest expansion, nonlabored respirations  GI: Unable to assess due to telehealth visit  : Unable to assess due to telehealth visit  Psych/Mental Health:  Pleasant mood and affect    NEUROLOGICAL EXAMINATION:     Mental Status:   Ms. Hercules is awake, alert, oriented x 3.  Speech is clear.  Speech pattern is fluent.  She is a reliable historian.  She follows commands.  She can name objects without errors.  Good insight with regards to present medical condition.  Asked appropriate questions.    Cranial Nerves:  I: smell Not tested   II: visual fields Not assessed   II: pupils Unable to reliably assess due to limitations of telehealth   II: optic disc Not assessed   III,VII: ptosis none noted   III,IV,VI: extraocular muscles  Full ROM, gaze is conjugate, I appreciate no nystagmus   V: mastication Normal, speech is clear, swallowing without difficulty   V: facial light touch sensation  Denies any lateralizing sensory deficit or paresthesias to the face   VII: facial muscle function   symmetric   VIII: hearing symmetric   IX: soft palate elevation  normal   XI: trapezius strength  Not assessed   XI: sternocleidomastoid strength Not assessed   XI: neck flexion strength  Not assessed   XII: tongue  midline    -Motor: Not assessed as this is a telehealth visit.  Denies any lateralizing motor deficit or

## 2024-08-21 ENCOUNTER — TELEPHONE (OUTPATIENT)
Age: 64
End: 2024-08-21

## 2024-09-05 ENCOUNTER — HOSPITAL ENCOUNTER (OUTPATIENT)
Facility: HOSPITAL | Age: 64
Discharge: HOME | End: 2024-09-08
Payer: COMMERCIAL

## 2024-09-05 DIAGNOSIS — R56.9 NEW ONSET SEIZURE (HCC): ICD-10-CM

## 2024-09-05 PROBLEM — R41.82 ACUTE ALTERATION IN MENTAL STATUS: Status: ACTIVE | Noted: 2024-09-05

## 2024-09-05 PROCEDURE — 95816 EEG AWAKE AND DROWSY: CPT

## 2024-09-05 PROCEDURE — 95816 EEG AWAKE AND DROWSY: CPT | Performed by: PSYCHIATRY & NEUROLOGY

## 2024-09-05 NOTE — PROCEDURES
[facility]              [address] [place] [address]                                   EEG      PATIENT NAME: NIKI SCOTT                : 1960  MED REC NO: [id number]                       ROOM:   ACCOUNT NO: [id number]                       ADMIT DATE: 2024  PROVIDER: Casey Erazo MD    DATE OF SERVICE:  2024    REFERRING PHYSICIAN:  JUANY MIGUEL    CLINICAL INDICATION:  The patient is a 63-year-old female with a history of seizures and strokes, EEG to rule out seizures, rule out cortical abnormality, rule out epilepsy.  The patient is on no medication for seizures now.    EEG CLASSIFICATION:  The patient is normal and awake.    DESCRIPTION OF THE RECORD:  This is a 16-channel EEG recording on the patient to evaluate for seizures.  During this time, the patient had a posteriorly located occipital alpha rhythm of 9 to 10 Hz that did attenuate some with eye opening in the awake state.  There were no clear areas of focal slowing.  No clear spike or spike-and-wave discharges seen and no electrographic or dysrhythmic spells of any type seen.  The patient had photic stimulation performed and produced a mild driving response in the posterior head regions.  Hyperventilation was not performed.  The patient was awake throughout the whole recording.  There was some mild movement muscle electrode artifact seen, but the study was overall of good quality.    INTERPRETATION:  This is a normal electroencephalogram with the patient awake, showing no clear focal abnormality.  No clear spike and wave discharges.  No recorded electrographic or dysrhythmic spells of any type.        CASEY ERAZO MD      TAS/AQS  D:  2024 12:12:43  T:  2024 12:55:45  JOB #:  [id number]

## 2024-09-27 DIAGNOSIS — R56.9 NEW ONSET SEIZURE (HCC): ICD-10-CM

## 2024-09-27 DIAGNOSIS — I15.9 SECONDARY HYPERTENSION, UNSPECIFIED: ICD-10-CM

## 2024-09-27 DIAGNOSIS — E26.02: ICD-10-CM

## 2024-09-27 NOTE — TELEPHONE ENCOUNTER
Pt is needing a refill on her metFORMIN (GLUCOPHAGE-XR) 500 MG extended release tablet and glipiZIDE (GLUCOTROL) 5 MG tablet sent to   [facility]/pharmacy #[id number] - [place], [place] - [address] [phone] - F [phone]   She can be reached at [phone].

## 2024-09-27 NOTE — TELEPHONE ENCOUNTER
Last appointment: 6/19/24  Next appointment: 10/16/24    Requested Prescriptions     Pending Prescriptions Disp Refills    glipiZIDE (GLUCOTROL) 5 MG tablet 180 tablet 1     Sig: Take 1 tablet by mouth 2 times daily (before meals)    metFORMIN (GLUCOPHAGE-XR) 500 MG extended release tablet 180 tablet 1     Sig: Take 1 tablet by mouth 2 times daily (with meals)         For Pharmacy Admin Tracking Only    Program: Medication Refill  CPA in place:    Recommendation Provided To:   Intervention Detail: New Rx: 2, reason: Patient Preference  Intervention Accepted By:   Gap Closed?:    Time Spent (min): 5

## 2024-09-28 DIAGNOSIS — E03.9 HYPOTHYROIDISM, UNSPECIFIED: ICD-10-CM

## 2024-09-28 DIAGNOSIS — I15.9 SECONDARY HYPERTENSION, UNSPECIFIED: ICD-10-CM

## 2024-09-30 DIAGNOSIS — E26.02: ICD-10-CM

## 2024-09-30 DIAGNOSIS — I15.9 SECONDARY HYPERTENSION, UNSPECIFIED: ICD-10-CM

## 2024-09-30 DIAGNOSIS — R56.9 NEW ONSET SEIZURE (HCC): ICD-10-CM

## 2024-09-30 RX ORDER — LEVOTHYROXINE SODIUM 25 UG/1
TABLET ORAL
Qty: 135 TABLET | Refills: 1 | Status: SHIPPED | OUTPATIENT
Start: 2024-09-30

## 2024-09-30 RX ORDER — METFORMIN HCL 500 MG
500 TABLET, EXTENDED RELEASE 24 HR ORAL 2 TIMES DAILY WITH MEALS
Qty: 180 TABLET | Refills: 1 | Status: SHIPPED | OUTPATIENT
Start: 2024-09-30

## 2024-09-30 RX ORDER — GLIPIZIDE 5 MG/1
5 TABLET ORAL
Qty: 180 TABLET | Refills: 1 | Status: SHIPPED | OUTPATIENT
Start: 2024-09-30

## 2024-09-30 NOTE — TELEPHONE ENCOUNTER
Last appointment: 6/19/24  Next appointment: 10/16/24  Previous refill encounter(s): 4/12/24 #135 with 1 refill    Requested Prescriptions     Pending Prescriptions Disp Refills    levothyroxine (SYNTHROID) 25 MCG tablet [Pharmacy Med Name: LEVOTHYROXINE 25 MCG TABLET] 135 tablet 1     Sig: TAKE 1 AND 1/2 TABLET BY MOUTH DAILY BEFORE BREAKFAST         For Pharmacy Admin Tracking Only    Program: Medication Refill  CPA in place:    Recommendation Provided To:   Intervention Detail: New Rx: 1, reason: Patient Preference  Intervention Accepted By:   Gap Closed?:    Time Spent (min): 5

## 2024-10-01 RX ORDER — GLIPIZIDE 5 MG/1
10 TABLET ORAL
Qty: 180 TABLET | Refills: 1 | OUTPATIENT
Start: 2024-10-01

## 2024-10-01 RX ORDER — METFORMIN HYDROCHLORIDE 500 MG/1
500 TABLET, EXTENDED RELEASE ORAL 2 TIMES DAILY WITH MEALS
Qty: 180 TABLET | Refills: 1 | OUTPATIENT
Start: 2024-10-01

## 2024-10-16 ENCOUNTER — OFFICE VISIT (OUTPATIENT)
Age: 64
End: 2024-10-16
Payer: COMMERCIAL

## 2024-10-16 VITALS
HEART RATE: 102 BPM | WEIGHT: 165 LBS | BODY MASS INDEX: 30.17 KG/M2 | OXYGEN SATURATION: 94 % | SYSTOLIC BLOOD PRESSURE: 154 MMHG | RESPIRATION RATE: 17 BRPM | DIASTOLIC BLOOD PRESSURE: 97 MMHG | TEMPERATURE: 97.7 F

## 2024-10-16 DIAGNOSIS — R56.9 NEW ONSET SEIZURE (HCC): ICD-10-CM

## 2024-10-16 DIAGNOSIS — Z12.11 SCREENING FOR MALIGNANT NEOPLASM OF COLON: ICD-10-CM

## 2024-10-16 DIAGNOSIS — E11.8 TYPE 2 DIABETES MELLITUS WITH UNSPECIFIED COMPLICATIONS (HCC): Primary | ICD-10-CM

## 2024-10-16 DIAGNOSIS — I15.9 SECONDARY HYPERTENSION, UNSPECIFIED: ICD-10-CM

## 2024-10-16 DIAGNOSIS — E26.02: ICD-10-CM

## 2024-10-16 LAB — HBA1C MFR BLD: 5.3 %

## 2024-10-16 PROCEDURE — 3077F SYST BP >= 140 MM HG: CPT | Performed by: FAMILY MEDICINE

## 2024-10-16 PROCEDURE — 83036 HEMOGLOBIN GLYCOSYLATED A1C: CPT | Performed by: FAMILY MEDICINE

## 2024-10-16 PROCEDURE — 3046F HEMOGLOBIN A1C LEVEL >9.0%: CPT | Performed by: FAMILY MEDICINE

## 2024-10-16 PROCEDURE — 99214 OFFICE O/P EST MOD 30 MIN: CPT | Performed by: FAMILY MEDICINE

## 2024-10-16 PROCEDURE — 3080F DIAST BP >= 90 MM HG: CPT | Performed by: FAMILY MEDICINE

## 2024-10-16 RX ORDER — METOPROLOL SUCCINATE 25 MG/1
25 TABLET, EXTENDED RELEASE ORAL EVERY EVENING
Qty: 30 TABLET | Refills: 5 | Status: SHIPPED | OUTPATIENT
Start: 2024-10-16

## 2024-10-16 RX ORDER — BLOOD SUGAR DIAGNOSTIC
1 STRIP MISCELLANEOUS DAILY
Qty: 100 EACH | Refills: 3 | Status: SHIPPED | OUTPATIENT
Start: 2024-10-16

## 2024-10-16 RX ORDER — SPIRONOLACTONE 50 MG/1
50 TABLET, FILM COATED ORAL 2 TIMES DAILY
Qty: 60 TABLET | Refills: 3 | Status: SHIPPED | OUTPATIENT
Start: 2024-10-16

## 2024-10-16 RX ORDER — ASPIRIN 81 MG/1
81 TABLET, CHEWABLE ORAL DAILY
Qty: 30 TABLET | Refills: 3 | Status: SHIPPED | OUTPATIENT
Start: 2024-10-16

## 2024-10-16 RX ORDER — METFORMIN HCL 500 MG
500 TABLET, EXTENDED RELEASE 24 HR ORAL EVERY EVENING
Qty: 30 TABLET | Refills: 4 | Status: SHIPPED | OUTPATIENT
Start: 2024-10-16

## 2024-10-16 ASSESSMENT — PATIENT HEALTH QUESTIONNAIRE - PHQ9
SUM OF ALL RESPONSES TO PHQ QUESTIONS 1-9: 0
SUM OF ALL RESPONSES TO PHQ QUESTIONS 1-9: 0
SUM OF ALL RESPONSES TO PHQ9 QUESTIONS 1 & 2: 0
2. FEELING DOWN, DEPRESSED OR HOPELESS: NOT AT ALL
1. LITTLE INTEREST OR PLEASURE IN DOING THINGS: NOT AT ALL
SUM OF ALL RESPONSES TO PHQ QUESTIONS 1-9: 0
SUM OF ALL RESPONSES TO PHQ QUESTIONS 1-9: 0

## 2024-10-16 NOTE — PROGRESS NOTES
Chief Complaint   Patient presents with    Diabetes     Follow up     Hypertension     Follow up        \"Have you been to the ER, urgent care clinic since your last visit?  Hospitalized since your last visit?\"    NO    “Have you seen or consulted any other health care providers outside of [facility] since your last visit?”    NO        “Have you had a colorectal cancer screening such as a colonoscopy/FIT/Cologuard?    NO    No colonoscopy on file  No cologuard on file  No FIT/FOBT on file   No flexible sigmoidoscopy on file         Click Here for Release of Records Request     No results found for this visit on 10/16/24.   Vitals:    10/16/24 1541 10/16/24 1544   BP: (!) 143/104 (!) 154/97   Site: Left Upper Arm Right Upper Arm   Position: Sitting Sitting   Cuff Size: Large Adult Large Adult   Pulse: (!) 102    Resp: 17    Temp: 97.7 °F (36.5 °C)    TempSrc: Infrared    SpO2: 94%    Weight: 74.8 kg (165 lb)           The patient, Diana Hercules, identity was verified by name and .    Goal Outcome Evaluation:           Progress: no change  Outcome Evaluation: breastfeeding well, voiding and stooling

## 2024-10-16 NOTE — ASSESSMENT & PLAN NOTE
Orders:    metFORMIN (GLUCOPHAGE-XR) 500 MG extended release tablet; Take 1 tablet by mouth every evening    blood glucose test strips (ASCENSIA AUTODISC VI;ONE TOUCH ULTRA TEST VI) strip; 1 each by In Vitro route daily As needed.    blood glucose test strips (ONETOUCH VERIO) strip; 1 each by In Vitro route daily As needed.    spironolactone (ALDACTONE) 50 MG tablet; Take 1 tablet by mouth 2 times daily    aspirin 81 MG chewable tablet; Take 1 tablet by mouth daily    metoprolol succinate (TOPROL XL) 25 MG extended release tablet; Take 1 tablet by mouth every evening

## 2024-10-16 NOTE — PROGRESS NOTES
Diana Hercules (:  1960) is a 64 y.o. female,Established patient, here for evaluation of the following chief complaint(s):  Diabetes (Follow up ) and Hypertension (Follow up )    HTN with diabetic state A1c of 8.3   pt also present for Bp check , compliant w/ the bp meds, a low salt diet, an  active life style has no Chest Pain, has no legs swelling no lightheadedness,the pat has not been feeling anxious, and  Has not been feeling stressed out, otherwise feeling better since the last visit     DMtype II  Patient also present for diabetic check,  the patient has been compliancy w/ a diabetic diet, and eat less of carbohydrates, since last visit patient has been more active with daily walking, patient also states that there is no home monitoring glucose device    This time patient denies  tingling sensation, has no polyurea and polydipsia, last a1c was at target of 5.3    Hemoglobin A1C, POC  % 5.3 8.3       Feeling better since the last visit.    Current Outpatient Medications on File Prior to Visit   Medication Sig Dispense Refill    glipiZIDE (GLUCOTROL) 5 MG tablet Take 1 tablet by mouth 2 times daily (before meals) 180 tablet 1    metFORMIN (GLUCOPHAGE-XR) 500 MG extended release tablet Take 1 tablet by mouth 2 times daily (with meals) 180 tablet 1    levothyroxine (SYNTHROID) 25 MCG tablet TAKE 1 AND 1/2 TABLET BY MOUTH DAILY BEFORE BREAKFAST 135 tablet 1    atorvastatin (LIPITOR) 20 MG tablet Take 1 tablet by mouth nightly      spironolactone (ALDACTONE) 50 MG tablet Take 1 tablet by mouth 3 times daily 90 tablet 3    aspirin 81 MG chewable tablet Take 1 tablet by mouth daily 30 tablet 3     No current facility-administered medications on file prior to visit.     Constitutional: no fever, nl  energy levels, nl sleep patterns, nl appetite, and nl weight fluctuations,  - exercise habits,  nad     HENT: no ear pain or nosebleeds. No blurred vision  Respiratory: no shortness of breath, wheezing cough

## 2025-01-06 DIAGNOSIS — R56.9 NEW ONSET SEIZURE (HCC): ICD-10-CM

## 2025-01-06 DIAGNOSIS — E26.02: ICD-10-CM

## 2025-01-06 DIAGNOSIS — I15.9 SECONDARY HYPERTENSION, UNSPECIFIED: ICD-10-CM

## 2025-01-06 RX ORDER — SPIRONOLACTONE 50 MG/1
50 TABLET, FILM COATED ORAL 2 TIMES DAILY
Qty: 180 TABLET | Refills: 3 | Status: SHIPPED | OUTPATIENT
Start: 2025-01-06

## 2025-01-25 DIAGNOSIS — I15.9 SECONDARY HYPERTENSION, UNSPECIFIED: ICD-10-CM

## 2025-01-25 DIAGNOSIS — E03.9 HYPOTHYROIDISM, UNSPECIFIED: ICD-10-CM

## 2025-01-29 RX ORDER — LEVOTHYROXINE SODIUM 25 UG/1
TABLET ORAL
Qty: 135 TABLET | Refills: 0 | Status: SHIPPED | OUTPATIENT
Start: 2025-01-29

## 2025-02-03 DIAGNOSIS — E26.02: ICD-10-CM

## 2025-02-03 DIAGNOSIS — I15.9 SECONDARY HYPERTENSION, UNSPECIFIED: ICD-10-CM

## 2025-02-03 DIAGNOSIS — R56.9 NEW ONSET SEIZURE (HCC): ICD-10-CM

## 2025-02-04 RX ORDER — METFORMIN HYDROCHLORIDE 500 MG/1
500 TABLET, EXTENDED RELEASE ORAL 2 TIMES DAILY WITH MEALS
Qty: 180 TABLET | Refills: 1 | OUTPATIENT
Start: 2025-02-04

## 2025-02-04 NOTE — TELEPHONE ENCOUNTER
New Glucophage XR 500mg 1/day dose sent on 10/16/24 for #30 with 4 refills.    For Pharmacy Admin Tracking Only    Program: Medication Refill  CPA in place:    Recommendation Provided To:   Intervention Detail: Discontinued Rx: 1, reason: Therapy Complete  Intervention Accepted By:   Gap Closed?:    Time Spent (min): 5

## 2025-04-30 DIAGNOSIS — I15.9 SECONDARY HYPERTENSION, UNSPECIFIED: ICD-10-CM

## 2025-04-30 DIAGNOSIS — E03.9 HYPOTHYROIDISM, UNSPECIFIED: ICD-10-CM

## 2025-04-30 NOTE — TELEPHONE ENCOUNTER
Last appointment: 10/16/24  Next appointment: none  Previous refill encounter(s): 1/29/25 #135    Requested Prescriptions     Pending Prescriptions Disp Refills    levothyroxine (SYNTHROID) 25 MCG tablet [Pharmacy Med Name: LEVOTHYROXINE 25 MCG TABLET] 135 tablet 0     Sig: TAKE 1 AND 1/2 TABLET BY MOUTH DAILY BEFORE BREAKFAST         For Pharmacy Admin Tracking Only    Program: Medication Refill  CPA in place:    Recommendation Provided To:   Intervention Detail: New Rx: 1, reason: Patient Preference  Intervention Accepted By:   Gap Closed?:    Time Spent (min): 5

## 2025-05-01 RX ORDER — LEVOTHYROXINE SODIUM 25 UG/1
TABLET ORAL
Qty: 135 TABLET | Refills: 0 | Status: SHIPPED | OUTPATIENT
Start: 2025-05-01

## 2025-07-28 ENCOUNTER — TELEMEDICINE (OUTPATIENT)
Age: 65
End: 2025-07-28
Payer: COMMERCIAL

## 2025-07-28 DIAGNOSIS — I65.21 CAROTID STENOSIS, ASYMPTOMATIC, RIGHT: ICD-10-CM

## 2025-07-28 DIAGNOSIS — Z86.73 HISTORY OF STROKE: ICD-10-CM

## 2025-07-28 DIAGNOSIS — R56.9 NEW ONSET SEIZURE (HCC): Primary | ICD-10-CM

## 2025-07-28 PROCEDURE — 99213 OFFICE O/P EST LOW 20 MIN: CPT | Performed by: NURSE PRACTITIONER

## 2025-07-28 NOTE — ASSESSMENT & PLAN NOTE
Patient with a history of hypertension,hypertensive cardiomyopathy, mitral valve prolapse, hyperlipidemia, diabetes (hemoglobin A1c 8.3), right MCA stroke (5/2024), and left upper extremity focal motor seizure in the setting of acute stroke without loss of or altered level of consciousness.     -CTA head and neck no large vessel occlusion, no hemodynamic significant ICA stenosis, mild emphysema  -MRI Brain: Evolving subacute right posterior MCA infarct with cortical petechial hemorrhage, no new areas of acute infarction.  Stable left posterior fossa epidermoid cyst  -MRI brain: Evolving subacute right posterior MCA infarct with cortical fatigue no hemorrhage.  Stable left posterior fossa epidermoid cyst.  -Bilateral carotid ultrasounds mild stenosis with heterogeneous plaque in the right internal carotid artery.  No stenosis in the left internal carotid artery  -TTE: Ejection fraction 55 to 60%, left ventricular size normal wall thickness  -Total cholesterol 116, LDL 54.8 TSH 0.47, free T4 1.2     Ms. Diomedes has had no symptoms to suggest recurrent stroke.     Continue aspirin 81 mg  LDL 54.8, she is off of her Lipitor.  She continues to work with her PCP to manage her other risk factors such as her diabetes and her hypertension.

## 2025-07-28 NOTE — ASSESSMENT & PLAN NOTE
Patient with admitted to the hospital 5/25-5/27 with an ischemic stroke with mild hemorrhagic transformation.  Patient was readmitted 5/28-5/31 with worsening left upper left lower extremity weakness and sensory symptoms.  The patient also had an involuntary convulsion of the left upper extremity without loss of or altered level of consciousness. She was placed on Keppra 500 mg twice a day.  She states she feels the symptoms you are having were related to the death of her son.  She would like to wean off of the Keppra.     Patient was seen in follow-up today she has been off the Keppra for over a year she has had no symptoms to suggest clinical or subclinical seizures.     -MRI Brain: Evolving subacute right posterior MCA infarct with cortical petechial hemorrhage, no new areas of acute infarction.  Stable left posterior fossa epidermoid cyst  -EEG (5/28/2024): Clinical Interpretation: This EEG, performed during wakefulness and drowsiness, is normal.  -EEG (9/5/2024): INTERPRETATION: This is a normal electroencephalogram with the patient awake, showing no clear focal abnormality. No clear spike and wave discharges. No recorded electrographic or dysrhythmic spells of any type.    EEG x 2 with no electrographic discharges.  No symptoms to suggest clinical or subclinical seizures.  No indication for AED at this time

## 2025-07-28 NOTE — PROGRESS NOTES
Use Topics    Alcohol use: No    Drug use: No      History reviewed. No pertinent family history.     ROS: A ten system review of constitutional, cardiovascular, respiratory, musculoskeletal, endocrine, skin, SHEENT, genitourinary, psychiatric and neurologic systems was obtained and is unremarkable with the except as stated under HPI    Objective:   Blood pressure cuff not available: Most recent documented vital signs: Blood pressure 143/104, pulse 102, respirations 17, oxygen saturation is 94%    BMI: 30.17 kg/m²    Physical Exam:  General:  Well developed, well nourished, and groomed female in no acute distress.  HEENT: normocephalic  Neck: trach is midline, upon observation neck appears to be supple.  Cardiovascular: Unable to reliably assess due to telehealth visit  Respiratory: Upon observation equal chest expansion, nonlabored respirations  GI: Unable to assess due to telehealth visit  : Unable to assess due to telehealth visit  Psych/Mental Health:  Pleasant mood and affect    NEUROLOGICAL EXAMINATION:     Mental Status:   Ms. Hercules is awake, alert, oriented x 3.  Speech is clear.  Speech pattern is fluent.  She is a reliable historian.  She follows commands.  She can name objects without errors.  Good insight with regards to present medical condition.  Asked appropriate questions.    Cranial Nerves:  I: smell Not tested   II: visual fields Not assessed   II: pupils Unable to reliably assess due to limitations of telehealth   II: optic disc Not assessed   III,VII: ptosis none noted   III,IV,VI: extraocular muscles  Full ROM, gaze is conjugate, I appreciate no nystagmus   V: mastication Normal, speech is clear, swallowing without difficulty   V: facial light touch sensation  Denies any lateralizing sensory deficit or paresthesias to the face   VII: facial muscle function   symmetric   VIII: hearing symmetric   IX: soft palate elevation  normal   XI: trapezius strength  Appears to be intact   XI:

## 2025-07-28 NOTE — ASSESSMENT & PLAN NOTE
Stable: 5/2024: Bilateral carotid ultrasounds mild stenosis with heterogeneous plaque in the right internal carotid artery.  No stenosis in the left internal carotid artery     Continue aspirin 81 mg  Management of risk factors (including diabetes and hypertension) per patient's PCP  Annual carotid ultrasound

## 2025-08-01 DIAGNOSIS — E03.9 HYPOTHYROIDISM, UNSPECIFIED: ICD-10-CM

## 2025-08-01 DIAGNOSIS — I15.9 SECONDARY HYPERTENSION, UNSPECIFIED: ICD-10-CM

## 2025-08-04 RX ORDER — LEVOTHYROXINE SODIUM 25 UG/1
TABLET ORAL
Qty: 135 TABLET | Refills: 0 | OUTPATIENT
Start: 2025-08-04

## 2025-08-04 RX ORDER — LEVOTHYROXINE SODIUM 25 UG/1
25 TABLET ORAL DAILY
Qty: 135 TABLET | Refills: 0 | Status: SHIPPED | OUTPATIENT
Start: 2025-08-04